# Patient Record
Sex: FEMALE | Race: BLACK OR AFRICAN AMERICAN | NOT HISPANIC OR LATINO | Employment: FULL TIME | URBAN - METROPOLITAN AREA
[De-identification: names, ages, dates, MRNs, and addresses within clinical notes are randomized per-mention and may not be internally consistent; named-entity substitution may affect disease eponyms.]

---

## 2017-01-25 ENCOUNTER — HOSPITAL ENCOUNTER (EMERGENCY)
Facility: HOSPITAL | Age: 27
Discharge: HOME | End: 2017-01-25
Attending: EMERGENCY MEDICINE
Payer: MEDICAID

## 2017-01-25 VITALS
HEIGHT: 66 IN | DIASTOLIC BLOOD PRESSURE: 61 MMHG | BODY MASS INDEX: 35.36 KG/M2 | WEIGHT: 220 LBS | HEART RATE: 89 BPM | OXYGEN SATURATION: 99 % | SYSTOLIC BLOOD PRESSURE: 110 MMHG | TEMPERATURE: 98 F | RESPIRATION RATE: 18 BRPM

## 2017-01-25 DIAGNOSIS — R21 RASH AND NONSPECIFIC SKIN ERUPTION: ICD-10-CM

## 2017-01-25 DIAGNOSIS — J06.9 VIRAL URI: ICD-10-CM

## 2017-01-25 DIAGNOSIS — R50.9 ACUTE FEBRILE ILLNESS: Primary | ICD-10-CM

## 2017-01-25 LAB
B-HCG UR QL: NEGATIVE
CTP QC/QA: YES

## 2017-01-25 PROCEDURE — 25000003 PHARM REV CODE 250: Performed by: NURSE PRACTITIONER

## 2017-01-25 PROCEDURE — 81025 URINE PREGNANCY TEST: CPT | Performed by: NURSE PRACTITIONER

## 2017-01-25 PROCEDURE — 99284 EMERGENCY DEPT VISIT MOD MDM: CPT | Mod: 25

## 2017-01-25 RX ORDER — HYDROCORTISONE 1 %
CREAM (GRAM) TOPICAL
Qty: 30 G | Refills: 0 | Status: ON HOLD | OUTPATIENT
Start: 2017-01-25 | End: 2018-01-20 | Stop reason: HOSPADM

## 2017-01-25 RX ORDER — ACETAMINOPHEN 325 MG/1
650 TABLET ORAL
Status: COMPLETED | OUTPATIENT
Start: 2017-01-25 | End: 2017-01-25

## 2017-01-25 RX ORDER — ONDANSETRON 4 MG/1
4 TABLET, FILM COATED ORAL EVERY 8 HOURS PRN
Qty: 12 TABLET | Refills: 0 | Status: SHIPPED | OUTPATIENT
Start: 2017-01-25 | End: 2017-06-19

## 2017-01-25 RX ORDER — BENZONATATE 100 MG/1
100 CAPSULE ORAL 3 TIMES DAILY PRN
Qty: 20 CAPSULE | Refills: 0 | Status: SHIPPED | OUTPATIENT
Start: 2017-01-25 | End: 2017-02-04

## 2017-01-25 RX ORDER — ONDANSETRON 4 MG/1
4 TABLET, ORALLY DISINTEGRATING ORAL
Status: COMPLETED | OUTPATIENT
Start: 2017-01-25 | End: 2017-01-25

## 2017-01-25 RX ADMIN — ONDANSETRON 4 MG: 4 TABLET, ORALLY DISINTEGRATING ORAL at 05:01

## 2017-01-25 RX ADMIN — ACETAMINOPHEN 650 MG: 325 TABLET ORAL at 05:01

## 2017-01-25 NOTE — ED PROVIDER NOTES
Encounter Date: 1/25/2017    SCRIBE #1 NOTE: I, Yareli Christianson, am scribing for, and in the presence of,  KEITH Kimble. I have scribed the following portions of the note - Other sections scribed: ROS, HPI.       History     Chief Complaint   Patient presents with    Fever     Pt reports fever and chills on and off x 3 day.      Review of patient's allergies indicates:   Allergen Reactions    Amoxil [amoxicillin]     Orange juice Hives    Toradol [ketorolac] Nausea And Vomiting     HPI Comments: CC: Nasal Congestion    HPI: Patient is a 26 y.o. F with a past medical history of Flu, UTI, and Yeast infection who presents to the ED for evaluation of acute onset, worsening nasal congestion x2 days. Patient reports associated cough, sore throat, and myalgias x2 days. She attempted treatment with Ibuprofen (last dose last night) and Tamiflu, but notes mild relief. No modifying factors. She denies fever, nausea, vomiting, diarrhea, and/or headache. She states she had an appointment with PCP at [facility], but her Rapid Flu was negative. Patient also notes a generalized erythematous rash, primarily on her bilateral lower legs and upper arms.  Her reports using a new detergent.  Denies any known insect bites.  She was prescribed azithromycin by her primary care doctor yesterday.    The history is provided by the patient. No  was used.     Past Medical History   Diagnosis Date    Chlamydia     Flu     UTI (lower urinary tract infection)     Yeast infection      Past Medical History Pertinent Negatives   Diagnosis Date Noted    Abnormal Pap smear 7/10/2012    Anemia 7/10/2012    Asthma 7/10/2012    Asthma 4/15/2014    Blood dyscrasia 7/10/2012    Breast disorder 7/10/2012    Chronic kidney disease 7/10/2012    Complication of anesthesia 7/10/2012    Coronary artery disease 7/10/2012    Deep vein thrombosis 7/10/2012    Diabetes mellitus 7/10/2012    Diabetes mellitus 4/15/2014     Herpes simplex without mention of complication 7/10/2012    HIV infection 7/10/2012    Hypertension 7/10/2012    Infertility 7/10/2012    Liver disease 7/10/2012    Mental disorder 7/10/2012    Postpartum depression 7/10/2012    Rh incompatibility 7/10/2012    Seizures 7/10/2012    Sickle cell anemia 7/10/2012    Stroke 7/10/2012    Systemic lupus erythematosus 7/10/2012    Thyroid disease 7/10/2012    Trauma 7/10/2012    Varicosities 7/10/2012     Past Surgical History   Procedure Laterality Date     section      Hernia repair       Family History   Problem Relation Age of Onset    No Known Problems Mother     No Known Problems Father     Diabetes Other     Hypertension Other      Social History   Substance Use Topics    Smoking status: Former Smoker    Smokeless tobacco: None    Alcohol use Yes      Comment: occ     Review of Systems   Constitutional: Positive for diaphoresis. Negative for fever.   HENT: Positive for congestion and sore throat.    Respiratory: Positive for cough.    Gastrointestinal: Negative for abdominal pain, diarrhea, nausea and vomiting.   Genitourinary: Negative for dysuria.   Musculoskeletal: Positive for myalgias.   Skin: Positive for rash (generalized, primarily on BLE and wrists). Negative for color change.   Neurological: Negative for headaches.   Psychiatric/Behavioral: Negative for confusion.       Physical Exam   Initial Vitals   BP Pulse Resp Temp SpO2   17 1646 17 1646 17 1646 17 1646 17 1646   108/58 88 18 99.2 °F (37.3 °C) 98 %     Physical Exam    Nursing note and vitals reviewed.  Constitutional: She appears well-developed and well-nourished. She is not diaphoretic. She is cooperative.  Non-toxic appearance. No distress.   HENT:   Head: Normocephalic and atraumatic.   Right Ear: Tympanic membrane and external ear normal.   Left Ear: Tympanic membrane and external ear normal.   Nose: Rhinorrhea present. Right sinus  exhibits no maxillary sinus tenderness and no frontal sinus tenderness. Left sinus exhibits no maxillary sinus tenderness and no frontal sinus tenderness.   Mouth/Throat: Uvula is midline, oropharynx is clear and moist and mucous membranes are normal. No trismus in the jaw.   Moist mucous membranes.   Eyes: Conjunctivae and EOM are normal. Pupils are equal, round, and reactive to light.   Neck: Normal range of motion, full passive range of motion without pain and phonation normal. No tracheal deviation and normal range of motion present. No rigidity.   Cardiovascular: Normal rate, regular rhythm and normal heart sounds. Exam reveals no gallop and no friction rub.    No murmur heard.  Pulses:       Radial pulses are 2+ on the right side, and 2+ on the left side.        Posterior tibial pulses are 2+ on the right side, and 2+ on the left side.   Pulmonary/Chest: Effort normal and breath sounds normal. No stridor. No tachypnea and no bradypnea. No respiratory distress. She has no wheezes. She has no rhonchi. She has no rales. She exhibits no tenderness.   Abdominal: Soft. Bowel sounds are normal. She exhibits no distension and no mass. There is no tenderness. There is no rigidity, no rebound, no guarding and no CVA tenderness.   Lymphadenopathy:     She has no cervical adenopathy.   Neurological: She is alert and oriented to person, place, and time. She has normal strength. No sensory deficit. GCS eye subscore is 4. GCS verbal subscore is 5. GCS motor subscore is 6.   Skin: Skin is warm, dry and intact. Lesion noted. No petechiae noted. Rash is not pustular, not vesicular and not urticarial. No cyanosis or erythema. Nails show no clubbing.   Patient with multiple small lesions to bilateral lower extremities no larger than 0.5 cm.  These areas have no surrounding erythema, edema, or drainage.  These lesions are blanchable.  No petechiae.  She also has several on her bilateral upper hands or wrists.  No lesions in the  uyen.   Psychiatric: She has a normal mood and affect. Her behavior is normal. Thought content normal.         ED Course   Procedures  Labs Reviewed   POCT URINE PREGNANCY             Medical Decision Making:   History:   Old Records Summarized: records from clinic visits.       <> Summary of Records: Patient seen in clinic yesterday for fever, runny nose, sore throat congestion, cough, abdominal pain and nausea as well as chronic right knee pain.  X-ray showed lateral patellar subluxation recommending MRI.  Discharged in the clinic with Z-Enrique for acute maxillary sinusitis, Phenergan DM.  Influenza testing and clinic was negative.  Recommended Chloraseptic spray for sore throat, ibuprofen for symptomatic treatment.       APC / Resident Notes:   This is an evaluation of a 26 y.o. female that presents to the Emergency Department for chills, runny nose, generalized body aches, cough and nasal congestion for 3 days.  She was seen by her primary care doctor yesterday discharged with prescription for azithromycin.  Her flu screen is negative at the time. The patient is a non-toxic, afebrile, and well appearing female. On physical exam ears and pharynx are without evidence of infection.  Moist mucous membranes.  Neck soft and supple with no meningeal signs or cervical lymphadenopathy. Breath sounds are clear and equal bilaterally with no adventitious breath sounds, tachypnea or respiratory distress with room air pulse ox of 98% and no evidence of hypoxia.  She moves all extremities without difficulty.  There are multiple lesions to bilateral lower extremities and upper extremities that appear to be bites.  There is no surrounding erythema, drainage, or area of induration, or area of increased warmth around these lesions.  Of note, she does report she has a dog that she's been letting outside and does report itching after playing with him.  She also reports that she has recently changed detergents.  She reports her  daughter has similar appearing lesions.  There are no lesions in the webspaces or the oral cavity.  Does not appear fungal.  RESULTS: UPT negative.      Given the above findings, my overall impression is viral URI and rash-likely insect bites versus contact dermatitis. Given the above findings, I do not think the patient has OM, OE, strep pharyngitis, menigintis, pneumonia, asthma, petechial rash, Hand, Foot, and Mouth, Scabies, Shingles, Chicken Pox, meningococcemia, TENs, or SJS.    ED Treatments: Tylenol and Zofran. D/C Meds: Zofran, hydrocortisone cream, and Tessalon pearls. Additional recommendations hydration, observation of lesions on hands and legs. The diagnosis, treatment plan, instructions for follow-up and reevaluation with her PCP as well as ED return precautions have been discussed and she has verbalized an understanding of the information. All questions or concerns have been addressed. This case was discussed with Dr. Guan who is in agreement with my assessment and plan. MI Girard, KEITH-C         Scribe Attestation:   Scribe #1: I performed the above scribed service and the documentation accurately describes the services I performed. I attest to the accuracy of the note.    Attending Attestation:           Physician Attestation for Scribe:  Physician Attestation Statement for Scribe #1: I, KEITH Kimble, reviewed documentation, as scribed by Yareli Christianson in my presence, and it is both accurate and complete.                 ED Course     Clinical Impression:   The primary encounter diagnosis was Acute febrile illness. Diagnoses of Viral URI and Rash and nonspecific skin eruption were also pertinent to this visit.    Disposition:   Disposition: Discharged  Condition: Stable       KEITH Sims  01/25/17 2749

## 2017-01-25 NOTE — ED AVS SNAPSHOT
[facility]  [place] [address]               Catina Myers   2017  4:56 PM   ED    Description:  Female : 1990   Department:  [facility]           Your Care was Coordinated By:     Provider Role From To    Jonny Guan MD Attending Provider 17 [id number] --    KEITH Sims Nurse Practitioner 17 [id number] --      Reason for Visit     Fever           Diagnoses this Visit        Comments    Acute febrile illness    -  Primary     Viral URI         Rash and nonspecific skin eruption           ED Disposition     ED Disposition Condition Comment    Discharge             To Do List           Follow-up Information     Schedule an appointment as soon as possible for a visit with Kishore Gaspar MD.    Specialty:  Family Medicine    Why:  This Week, For Follow-Up    Contact information:    [address] [facility] [address] [phone]          Go to [facility].    Specialty:  Emergency Medicine    Why:  If symptoms worsen    Contact information:    [address] [place] [address] [phone]       These Medications        Disp Refills Start End    ondansetron (ZOFRAN) 4 MG tablet 12 tablet 0 2017     Take 1 tablet (4 mg total) by mouth every 8 (eight) hours as needed. - Oral    Pharmacy: [facility] [employer] [address] [place] [address]  [facility] Ph #: [phone]       benzonatate (TESSALON) 100 MG capsule 20 capsule 0 2017    Take 1 capsule (100 mg total) by mouth 3 (three) times daily as needed for Cough. - Oral    Pharmacy: [facility] [employer] [address] [place] [address] [place] -  [facility] Ph #: [phone]       hydrocortisone 1 % cream 30 g 0 2017    Apply to affected area 2 times daily    [facility] [address] [place] [address] [place] -  [facility] Ph #:  [phone]         [facility] On Call     [facility] On Call Nurse Care Line - 24/7 Assistance  Registered nurses in the [facility] provide clinical advisement, health education, appointment booking, and other advisory services.  Call for this free service at [phone].             Medications           Message regarding Medications     Verify the changes and/or additions to your medication regime listed below are the same as discussed with your clinician today.  If any of these changes or additions are incorrect, please notify your healthcare provider.        START taking these NEW medications        Refills    ondansetron (ZOFRAN) 4 MG tablet 0    Sig: Take 1 tablet (4 mg total) by mouth every 8 (eight) hours as needed.    Class: Print    Route: Oral    benzonatate (TESSALON) 100 MG capsule 0    Sig: Take 1 capsule (100 mg total) by mouth 3 (three) times daily as needed for Cough.    Class: Print    Route: Oral    hydrocortisone 1 % cream 0    Sig: Apply to affected area 2 times daily    Class: Print      These medications were administered today        Dose Freq    ondansetron disintegrating tablet 4 mg 4 mg ED 1 Time    Sig: Take 1 tablet (4 mg total) by mouth ED 1 Time.    Class: Normal    Route: Oral    acetaminophen tablet 650 mg 650 mg ED 1 Time    Sig: Take 2 tablets (650 mg total) by mouth ED 1 Time.    Class: Normal    Route: Oral           Verify that the below list of medications is an accurate representation of the medications you are currently taking.  If none reported, the list may be blank. If incorrect, please contact your healthcare provider. Carry this list with you in case of emergency.           Current Medications     cyclobenzaprine (FLEXERIL) 10 MG tablet Take 1 tablet (10 mg total) by mouth nightly as needed for Muscle spasms.    meloxicam (MOBIC) 15 MG tablet Take 1 tablet (15 mg total) by mouth once daily. TAKE WITH FOOD    acetaminophen tablet 650 mg Take 2 tablets (650 mg  "total) by mouth ED 1 Time.    azithromycin (Z-HARMEET) 250 MG tablet Take 2 tablets on day 1, then take 1 tablet on days 2-5    benzonatate (TESSALON) 100 MG capsule Take 1 capsule (100 mg total) by mouth 3 (three) times daily as needed for Cough.    buPROPion (WELLBUTRIN) 100 MG tablet Take 1 tablet (100 mg total) by mouth 2 (two) times daily.    butalbital-acetaminophen-caffeine -40 mg (FIORICET, ESGIC) -40 mg per tablet Take 1 tablet by mouth every 12 (twelve) hours as needed.    dicyclomine (BENTYL) 20 mg tablet Take 1 tablet (20 mg total) by mouth 3 (three) times daily.    doxycycline (MONODOX) 100 MG capsule Take 1 capsule (100 mg total) by mouth 2 (two) times daily.    fluticasone (FLONASE) 50 mcg/actuation nasal spray 1-2 sprays by Each Nare route once daily.    hydrocodone-acetaminophen 5-325mg (NORCO) 5-325 mg per tablet Take 1 tablet by mouth daily as needed for Pain.    hydrocortisone 1 % cream Apply to affected area 2 times daily    omeprazole (PRILOSEC) 40 MG capsule Take 1 capsule (40 mg total) by mouth once daily.    ondansetron (ZOFRAN) 4 MG tablet Take 1 tablet (4 mg total) by mouth every 8 (eight) hours as needed.    ondansetron disintegrating tablet 4 mg Take 1 tablet (4 mg total) by mouth ED 1 Time.    phentermine (ADIPEX-P) 37.5 mg tablet Take 1 tablet (37.5 mg total) by mouth before breakfast.    promethazine-dextromethorphan (PROMETHAZINE-DM) 6.25-15 mg/5 mL Syrp 1 teaspoon PO Q 8 hrs PRN cough. DO NOT DRIVE AFTER TAKING MED    sucralfate (CARAFATE) 1 gram tablet Take 1 tablet (1 g total) by mouth 3 (three) times daily.           Clinical Reference Information           Your Vitals Were     BP Pulse Temp Resp Height Weight    108/58 (BP Location: Right arm) 88 99.2 °F (37.3 °C) (Oral) 18 5' 6" (1.676 m) 99.8 kg (220 lb)    Last Period SpO2 BMI          01/22/2017 98% 35.51 kg/m2        Allergies as of 1/25/2017        Reactions    Amoxil [Amoxicillin]     Orange Juice Hives    Toradol " [Ketorolac] Nausea And Vomiting      Immunizations Administered on Date of Encounter - 1/25/2017     None      ED Micro, Lab, POCT     Start Ordered       Status Ordering Provider    01/25/17 1722 01/25/17 1721  POCT urine pregnancy  Once      Final result       ED Imaging Orders     None        Discharge Instructions       Please return to the Emergency Department for any new or worsening symptoms including: worsening cough, difficulty breathing, worsening rash\itching\drainage from the wounds on your arms or legs, fever, chest pain, shortness of breath, loss of consciousness, dizziness, weakness, or any other concerns. Please follow up with your Primary Care Provider within in the week.     Please take all medication as prescribed.       Discharge References/Attachments     URI, VIRAL, NO ABX (ADULT) (ENGLISH)    BITES AND STINGS, INSECT (ENGLISH)    INSECT STING, LOCAL REACTION (ENGLISH)      Your Scheduled Appointments     Feb 21, 2017 12:00 PM CST   Follow Up with Kishore Gaspar MD   [facility])    [address] [place] [address] [phone]              [facility] Sign-Up     Activating your [facility] account is as easy as 1-2-3!     1) Visit [email or web address], select Sign Up Now, enter this activation code and your date of birth, then select Next.  [email or web address]  Expires: 3/3/2017  1:20 PM      2) Create a username and password to use when you visit [facility] in the future and select a security question in case you lose your password and select Next.    3) Enter your e-mail address and click Sign Up!    Additional Information  If you have questions, please e-mail [email or web address] or call [phone] to talk to our [facility] staff. Remember, [facility] is NOT to be used for urgent needs. For medical emergencies, dial 911.         Smoking Cessation     If you would like to quit smoking:   You may be eligible for free services if you are a [place] resident and  started smoking cigarettes before September 1, 1988.  Call the Smoking Cessation Trust (SCT) toll free at (844) 283-4037 or (262) 458-5330.   Call 1-800-QUIT-NOW if you do not meet the above criteria.             [facility] complies with applicable Federal civil rights laws and does not discriminate on the basis of race, color, national origin, age, disability, or sex.        Language Assistance Services     ATTENTION: Language assistance services are available, free of charge. Please call [phone].      ATENCIÓN: Si habla español, tiene a francisco disposición servicios gratuitos de asistencia lingüística. Llame al [phone].     CHÚ Ý: N?u b?n nói Ti?ng Vi?t, có các d?ch v? h? tr? ngôn ng? mi?n phí dành cho b?n. G?i s? [phone].

## 2017-01-25 NOTE — DISCHARGE INSTRUCTIONS
Please return to the Emergency Department for any new or worsening symptoms including: worsening cough, difficulty breathing, worsening rash\itching\drainage from the wounds on your arms or legs, fever, chest pain, shortness of breath, loss of consciousness, dizziness, weakness, or any other concerns. Please follow up with your Primary Care Provider within in the week.     Please take all medication as prescribed.

## 2017-04-19 ENCOUNTER — HOSPITAL ENCOUNTER (EMERGENCY)
Facility: OTHER | Age: 27
Discharge: HOME | End: 2017-04-19
Attending: EMERGENCY MEDICINE
Payer: MEDICAID

## 2017-04-19 VITALS
BODY MASS INDEX: 40.12 KG/M2 | HEIGHT: 64 IN | HEART RATE: 78 BPM | SYSTOLIC BLOOD PRESSURE: 124 MMHG | DIASTOLIC BLOOD PRESSURE: 83 MMHG | TEMPERATURE: 99 F | RESPIRATION RATE: 18 BRPM | WEIGHT: 235 LBS | OXYGEN SATURATION: 100 %

## 2017-04-19 DIAGNOSIS — J40 BRONCHITIS: Primary | ICD-10-CM

## 2017-04-19 LAB
INFLUENZA A ANTIGEN, POC: NEGATIVE
INFLUENZA B ANTIGEN, POC: NEGATIVE
POC RAPID STREP A: NEGATIVE

## 2017-04-19 PROCEDURE — 81025 URINE PREGNANCY TEST: CPT

## 2017-04-19 PROCEDURE — 81003 URINALYSIS AUTO W/O SCOPE: CPT

## 2017-04-19 PROCEDURE — 99284 EMERGENCY DEPT VISIT MOD MDM: CPT | Mod: 25

## 2017-04-19 PROCEDURE — 96372 THER/PROPH/DIAG INJ SC/IM: CPT

## 2017-04-19 PROCEDURE — 63600175 PHARM REV CODE 636 W HCPCS: Performed by: EMERGENCY MEDICINE

## 2017-04-19 RX ORDER — CODEINE PHOSPHATE AND GUAIFENESIN 10; 100 MG/5ML; MG/5ML
10 SOLUTION ORAL EVERY 4 HOURS PRN
Qty: 236 ML | Refills: 0 | Status: SHIPPED | OUTPATIENT
Start: 2017-04-19 | End: 2017-04-29

## 2017-04-19 RX ORDER — IBUPROFEN 600 MG/1
600 TABLET ORAL EVERY 6 HOURS PRN
Qty: 20 TABLET | Refills: 0 | Status: SHIPPED | OUTPATIENT
Start: 2017-04-19 | End: 2017-05-24 | Stop reason: SDUPTHER

## 2017-04-19 RX ORDER — AZITHROMYCIN 250 MG/1
TABLET, FILM COATED ORAL
Qty: 6 TABLET | Refills: 0 | Status: SHIPPED | OUTPATIENT
Start: 2017-04-19 | End: 2017-05-24

## 2017-04-19 RX ORDER — DEXAMETHASONE SODIUM PHOSPHATE 4 MG/ML
4 INJECTION, SOLUTION INTRA-ARTICULAR; INTRALESIONAL; INTRAMUSCULAR; INTRAVENOUS; SOFT TISSUE
Status: COMPLETED | OUTPATIENT
Start: 2017-04-19 | End: 2017-04-19

## 2017-04-19 RX ADMIN — DEXAMETHASONE SODIUM PHOSPHATE 4 MG: 4 INJECTION, SOLUTION INTRAMUSCULAR; INTRAVENOUS at 04:04

## 2017-04-19 NOTE — ED PROVIDER NOTES
Encounter Date: 2017       History     Chief Complaint   Patient presents with    Generalized Body Aches     Pt c/o of gen body aches x 3 days, sore throat, congestion.     Review of patient's allergies indicates:   Allergen Reactions    Amoxil [amoxicillin]     Orange juice Hives    Toradol [ketorolac] Nausea And Vomiting     The history is provided by the patient.    26-year-old who complains of back pain and generalized body aches for the last 2 days.  She also reports chills, sore throat, nasal congestion and a cough.  Cough is nonproductive.  She has a headache as well.  No fever.  She's been taking multiple over-the-counter medicines without improvement.  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  Past Medical History:   Diagnosis Date    Chlamydia     Flu     UTI (lower urinary tract infection)     Yeast infection      Past Surgical History:   Procedure Laterality Date     SECTION      HERNIA REPAIR       Family History   Problem Relation Age of Onset    No Known Problems Mother     No Known Problems Father     Diabetes Other     Hypertension Other      Social History   Substance Use Topics    Smoking status: Former Smoker    Smokeless tobacco: None    Alcohol use Yes      Comment: occ     Review of Systems   Constitutional: Positive for chills. Negative for appetite change and fever.   HENT: Positive for congestion and sore throat.    Eyes: Negative for pain.   Respiratory: Positive for cough. Negative for shortness of breath.    Cardiovascular: Negative for chest pain.   Gastrointestinal: Negative for abdominal pain.   Genitourinary: Negative for dysuria.   Musculoskeletal: Positive for back pain and myalgias. Negative for neck stiffness.   Skin: Negative for rash.   Neurological: Positive for headaches.       Physical Exam   Initial Vitals   BP Pulse Resp Temp SpO2   17 1450 17 1450 17 1450 17 1450 17 1450    124/83 78 18 98.6 °F (37 °C) 100 %     Physical Exam    Nursing note and vitals reviewed.  Constitutional: She appears well-developed and well-nourished.   HENT:   Head: Normocephalic and atraumatic.   Right Ear: Tympanic membrane normal.   Left Ear: Tympanic membrane normal.   Nose: Mucosal edema present.   Mouth/Throat: Uvula is midline and oropharynx is clear and moist. No oropharyngeal exudate or posterior oropharyngeal edema.   Eyes: Conjunctivae and EOM are normal. Pupils are equal, round, and reactive to light.   Neck: Normal range of motion. Neck supple.   Cardiovascular: Normal rate and regular rhythm.   Pulmonary/Chest: Breath sounds normal.   Abdominal: Soft. There is no tenderness. There is no rebound and no guarding.   Musculoskeletal: Normal range of motion.   Neurological: She is alert and oriented to person, place, and time. She has normal strength.   Skin: Skin is warm and dry.   Psychiatric: She has a normal mood and affect.         ED Course   Procedures  Labs Reviewed   POCT INFLUENZA A/B - Abnormal; Notable for the following:        Result Value    Influenza B Ag Negative (*)     Inflenza A Ag Negative (*)     All other components within normal limits   POCT STREP A, RAPID - Abnormal; Notable for the following:     POC Rapid Strep A Negative (*)     All other components within normal limits   POCT RAPID STREP A   POCT INFLUENZA A/B             Medical Decision Making:   Initial Assessment:   26-year-old complaining of flulike symptoms for the last 2 days.  Lungs are clear.  Oropharynx is without erythema or exudate   ED Management:  Patient was checked for influenza as well as strep pharyngitis.  Both the tests were negative.  She'll be discharged on guaifenesin with codeine, ibuprofen and  azithromycin.                   ED Course     Clinical Impression:   The encounter diagnosis was Bronchitis.          Dori Adam MD  04/19/17 2416

## 2017-04-19 NOTE — DISCHARGE INSTRUCTIONS
Bronchitis, Antibiotic Treatment (Adult)    Bronchitis is an infection of the air passages (bronchial tubes) in your lungs. It often occurs when you have a cold. This illness is contagious during the first few days and is spread through the air by coughing and sneezing, or by direct contact (touching the sick person and then touching your own eyes, nose, or mouth).  Symptoms of bronchitis include cough with mucus (phlegm) and low-grade fever. Bronchitis usually lasts 7 to 14 days. Mild cases can be treated with simple home remedies. More severe infection is treated with an antibiotic.  Home care  Follow these guidelines when caring for yourself at home:  · If your symptoms are severe, rest at home for the first 2 to 3 days. When you go back to your usual activities, don't let yourself get too tired.  · Do not smoke. Also avoid being exposed to secondhand smoke.  · You may use over-the-counter medicines to control fever or pain, unless another medicine was prescribed. (Note: If you have chronic liver or kidney disease or have ever had a stomach ulcer or gastrointestinal bleeding, talk with your healthcare provider before using these medicines. Also talk to your provider if you are taking medicine to prevent blood clots.) Aspirin should never be given to anyone younger than 18 years of age who is ill with a viral infection or fever. It may cause severe liver or brain damage.  · Your appetite may be poor, so a light diet is fine. Avoid dehydration by drinking 6 to 8 glasses of fluids per day (such as water, soft drinks, sports drinks, juices, tea, or soup). Extra fluids will help loosen secretions in the nose and lungs.  · Over-the-counter cough, cold, and sore-throat medicines will not shorten the length of the illness, but they may be helpful to reduce symptoms. (Note: Do not use decongestants if you have high blood pressure.)  · Finish all antibiotic medicine. Do this even if you are feeling better after only a  few days.  Follow-up care  Follow up with your healthcare provider, or as advised. If you had an X-ray or ECG (electrocardiogram), a specialist will review it. You will be notified of any new findings that may affect your care.  Note: If you are age 65 or older, or if you have a chronic lung disease or condition that affects your immune system, or you smoke, talk to your healthcare provider about having pneumococcal vaccinations and a yearly influenza vaccination (flu shot).  When to seek medical advice  Call your healthcare provider right away if any of these occur:  · Fever of 100.4°F (38°C) or higher  · Coughing up increased amounts of colored sputum  · Weakness, drowsiness, headache, facial pain, ear pain, or a stiff neck  Call 911, or get immediate medical care  Contact emergency services right away if any of these occur.  · Coughing up blood  · Worsening weakness, drowsiness, headache, or stiff neck  · Trouble breathing, wheezing, or pain with breathing  Date Last Reviewed: 9/13/2015 © 2000-2016 [employer] [place]. [address] [place] [address], [place] [address]. All rights reserved. This information is not intended as a substitute for professional medical care. Always follow your healthcare professional's instructions.

## 2017-04-19 NOTE — ED AVS SNAPSHOT
[facility] EMERGENCY DEPARTMENT  [address] [address]               Catina Myers   2017  3:09 PM   ED    Description:  Female : 1990   Department:  [facility] Emergency Department           Your Care was Coordinated By:     Provider Role From To    Dori Adam MD Attending Provider 17 1519 --      Reason for Visit     Generalized Body Aches           Diagnoses this Visit        Comments    Bronchitis    -  Primary       ED Disposition     None           To Do List           Follow-up Information     Schedule an appointment as soon as possible for a visit with Kishore Gaspar MD.    Specialty:  Family Medicine    Contact information:    [address] [facility] [address]  [phone]          Follow up with [facility] Emergency Department.    Specialty:  Emergency Medicine    Why:  If symptoms worsen    Contact information:    [address] [facility] [address]  [phone]       These Medications        Disp Refills Start End    azithromycin (Z-HARMEET) 250 MG tablet 6 tablet 0 2017     2 PO x 1 dose, then 1 PO Q day x 4 days    [facility] [address] [place] [address]  "[employer] [facility] #: [phone]       guaifenesin-codeine 100-10 mg/5 ml (TUSSI-ORGANIDIN NR)  mg/5 mL syrup 236 mL 0 2017    Take 10 mLs by mouth every 4 (four) hours as needed for Cough. - Oral    Pharmacy: [facility] [address] [place] [address]  "[employer] [facility] #: [phone]       ibuprofen (ADVIL,MOTRIN) 600 MG tablet 20 tablet 0 2017     Take 1 tablet (600 mg total) by mouth every 6 (six) hours as needed for Pain. - Oral    [facility] [address] [place] [address]  [facility]: [phone]         [facility]     [facility] Care [facility] -  Assistance  Unless otherwise directed by your provider, please contact  [facility], our nurse care line that is available for  assistance.     Registered nurses in the [facility] provide: appointment scheduling, clinical advisement, health education, and other advisory services.  Call: [phone] (toll free)               Medications           Message regarding Medications     Verify the changes and/or additions to your medication regime listed below are the same as discussed with your clinician today.  If any of these changes or additions are incorrect, please notify your healthcare provider.        START taking these NEW medications        Refills    azithromycin (Z-HARMEET) 250 MG tablet 0    Si PO x 1 dose, then 1 PO Q day x 4 days    Class: Print    guaifenesin-codeine 100-10 mg/5 ml (TUSSI-ORGANIDIN NR)  mg/5 mL syrup 0    Sig: Take 10 mLs by mouth every 4 (four) hours as needed for Cough.    Class: Print    Route: Oral    ibuprofen (ADVIL,MOTRIN) 600 MG tablet 0    Sig: Take 1 tablet (600 mg total) by mouth every 6 (six) hours as needed for Pain.    Class: Print    Route: Oral      These medications were administered today        Dose Freq    dexamethasone injection 4 mg 4 mg ED 1 Time    Sig: Inject 1 mL (4 mg total) into the muscle ED 1 Time.    Class: Normal    Route: Intramuscular           Verify that the below list of medications is an accurate representation of the medications you are currently taking.  If none reported, the list may be blank. If incorrect, please contact your healthcare provider. Carry this list with you in case of emergency.           Current Medications     atomoxetine (STRATTERA) 10 MG capsule Take 1 capsule (10 mg total) by mouth once daily.    azithromycin (Z-HARMEET) 250 MG tablet 2 PO x 1 dose, then 1 PO Q day x 4 days    buPROPion (WELLBUTRIN) 100 MG tablet Take 1 tablet (100 mg total) by mouth 2 (two) times daily.    butalbital-acetaminophen-caffeine -40 mg (FIORICET, ESGIC) -40 mg per tablet Take 1 tablet  "by mouth every 12 (twelve) hours as needed.    cyclobenzaprine (FLEXERIL) 10 MG tablet Take 1 tablet (10 mg total) by mouth nightly as needed for Muscle spasms.    dexamethasone injection 4 mg Inject 1 mL (4 mg total) into the muscle ED 1 Time.    dicyclomine (BENTYL) 20 mg tablet Take 1 tablet (20 mg total) by mouth 3 (three) times daily.    doxycycline (MONODOX) 100 MG capsule Take 1 capsule (100 mg total) by mouth 2 (two) times daily.    fluticasone (FLONASE) 50 mcg/actuation nasal spray 1-2 sprays by Each Nare route once daily.    guaifenesin-codeine 100-10 mg/5 ml (TUSSI-ORGANIDIN NR)  mg/5 mL syrup Take 10 mLs by mouth every 4 (four) hours as needed for Cough.    hydrocodone-acetaminophen 5-325mg (NORCO) 5-325 mg per tablet Take 1 tablet by mouth daily as needed for Pain.    hydrocortisone 1 % cream Apply to affected area 2 times daily    ibuprofen (ADVIL,MOTRIN) 600 MG tablet Take 1 tablet (600 mg total) by mouth every 6 (six) hours as needed for Pain.    meloxicam (MOBIC) 15 MG tablet Take 1 tablet (15 mg total) by mouth once daily. TAKE WITH FOOD    omeprazole (PRILOSEC) 40 MG capsule Take 1 capsule (40 mg total) by mouth once daily.    ondansetron (ZOFRAN) 4 MG tablet Take 1 tablet (4 mg total) by mouth every 8 (eight) hours as needed.    phentermine (ADIPEX-P) 37.5 mg tablet Take 1 tablet (37.5 mg total) by mouth before breakfast.    promethazine-dextromethorphan (PROMETHAZINE-DM) 6.25-15 mg/5 mL Syrp 1 teaspoon PO Q 8 hrs PRN cough. DO NOT DRIVE AFTER TAKING MED    sucralfate (CARAFATE) 1 gram tablet Take 1 tablet (1 g total) by mouth 3 (three) times daily.    topiramate (TOPAMAX) 25 MG tablet Take 1 tablet (25 mg total) by mouth 2 (two) times daily.           Clinical Reference Information           Your Vitals Were     BP Pulse Temp Resp Height Weight    124/83 (BP Location: Left arm, Patient Position: Sitting) 78 98.6 °F (37 °C) (Oral) 18 5' 4" (1.626 m) 106.6 kg (235 lb)    Last Period SpO2 BMI "          04/16/2017 100% 40.34 kg/m2        Allergies as of 4/19/2017        Reactions    Amoxil [Amoxicillin]     Orange Juice Hives    Toradol [Ketorolac] Nausea And Vomiting      Immunizations Administered on Date of Encounter - 4/19/2017     None      ED Micro, Lab, POCT     Start Ordered       Status Ordering Provider    04/19/17 1625 04/19/17 1625  POCT Rapid Strep A  Once      Final result     04/19/17 1610 04/19/17 1610  POCT Influenza A/B  Once      Final result     04/19/17 1539 04/19/17 1538  POCT rapid strep A  Once      Acknowledged     04/19/17 1539 04/19/17 1538  POCT Influenza A/B  Once      Acknowledged       ED Imaging Orders     None        Discharge Instructions         Bronchitis, Antibiotic Treatment (Adult)    Bronchitis is an infection of the air passages (bronchial tubes) in your lungs. It often occurs when you have a cold. This illness is contagious during the first few days and is spread through the air by coughing and sneezing, or by direct contact (touching the sick person and then touching your own eyes, nose, or mouth).  Symptoms of bronchitis include cough with mucus (phlegm) and low-grade fever. Bronchitis usually lasts 7 to 14 days. Mild cases can be treated with simple home remedies. More severe infection is treated with an antibiotic.  Home care  Follow these guidelines when caring for yourself at home:  · If your symptoms are severe, rest at home for the first 2 to 3 days. When you go back to your usual activities, don't let yourself get too tired.  · Do not smoke. Also avoid being exposed to secondhand smoke.  · You may use over-the-counter medicines to control fever or pain, unless another medicine was prescribed. (Note: If you have chronic liver or kidney disease or have ever had a stomach ulcer or gastrointestinal bleeding, talk with your healthcare provider before using these medicines. Also talk to your provider if you are taking medicine to prevent blood clots.) Aspirin  should never be given to anyone younger than 18 years of age who is ill with a viral infection or fever. It may cause severe liver or brain damage.  · Your appetite may be poor, so a light diet is fine. Avoid dehydration by drinking 6 to 8 glasses of fluids per day (such as water, soft drinks, sports drinks, juices, tea, or soup). Extra fluids will help loosen secretions in the nose and lungs.  · Over-the-counter cough, cold, and sore-throat medicines will not shorten the length of the illness, but they may be helpful to reduce symptoms. (Note: Do not use decongestants if you have high blood pressure.)  · Finish all antibiotic medicine. Do this even if you are feeling better after only a few days.  Follow-up care  Follow up with your healthcare provider, or as advised. If you had an X-ray or ECG (electrocardiogram), a specialist will review it. You will be notified of any new findings that may affect your care.  Note: If you are age 65 or older, or if you have a chronic lung disease or condition that affects your immune system, or you smoke, talk to your healthcare provider about having pneumococcal vaccinations and a yearly influenza vaccination (flu shot).  When to seek medical advice  Call your healthcare provider right away if any of these occur:  · Fever of 100.4°F (38°C) or higher  · Coughing up increased amounts of colored sputum  · Weakness, drowsiness, headache, facial pain, ear pain, or a stiff neck  Call 911, or get immediate medical care  Contact emergency services right away if any of these occur.  · Coughing up blood  · Worsening weakness, drowsiness, headache, or stiff neck  · Trouble breathing, wheezing, or pain with breathing  Date Last Reviewed: 9/13/2015 © 2000-2016 [employer]. [address] [place] [address], [place] [address]. All rights reserved. This information is not intended as a substitute for professional medical care. Always follow your healthcare professional's  instructions.          Your Scheduled Appointments     Apr 20, 2017  2:30 PM CDT   Follow Up with Kishore Gaspar MD   [facility] ([facility])    [address]  [place] [address] [phone]              [facility] Sign-Up     Activating your [facility] account is as easy as 1-2-3!     1) Visit [email or web address], select Sign Up Now, enter this activation code and your date of birth, then select Next.  [email or web address]  Expires: 4/29/2017 11:42 AM      2) Create a username and password to use when you visit [facility] in the future and select a security question in case you lose your password and select Next.    3) Enter your e-mail address and click Sign Up!    Additional Information  If you have questions, please e-mail [email or web address] or call [phone] to talk to our [facility] staff. Remember, [facility] is NOT to be used for urgent needs. For medical emergencies, dial 911.         Smoking Cessation     If you would like to quit smoking:   You may be eligible for free services if you are a [place] resident and started smoking cigarettes before September 1, 1988.  Call the Smoking Cessation Trust (SCT) toll free at (409) 104-9702 or (831) 912-8357.   Call 1-800-QUIT-NOW if you do not meet the above criteria.   Contact us via email: [email or web address]   View our website for more information: [email or web address]         [facility] complies with applicable Federal civil rights laws and does not discriminate on the basis of race, color, national origin, age, disability, or sex.        Language Assistance Services     ATTENTION: Language assistance services are available, free of charge. Please call [phone].      ATENCIÓN: Si habla español, tiene a francisco disposición servicios gratuitos de asistencia lingüística. Llame al [phone].     CHÚ Ý: N?u b?n nói Ti?ng Vi?t, có các d?ch v? h? tr? ngôn ng? mi?n phí dành cho b?n. G?i s?  [phone].

## 2017-05-24 ENCOUNTER — HOSPITAL ENCOUNTER (EMERGENCY)
Facility: OTHER | Age: 27
Discharge: HOME | End: 2017-05-24
Attending: EMERGENCY MEDICINE
Payer: COMMERCIAL

## 2017-05-24 VITALS
OXYGEN SATURATION: 100 % | SYSTOLIC BLOOD PRESSURE: 144 MMHG | HEIGHT: 64 IN | BODY MASS INDEX: 39.95 KG/M2 | TEMPERATURE: 99 F | RESPIRATION RATE: 20 BRPM | HEART RATE: 84 BPM | WEIGHT: 234 LBS | DIASTOLIC BLOOD PRESSURE: 78 MMHG
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
## 2017-05-24 DIAGNOSIS — M54.6 ACUTE BILATERAL THORACIC BACK PAIN: Primary | ICD-10-CM

## 2017-05-24 DIAGNOSIS — M54.50 ACUTE BILATERAL LOW BACK PAIN WITHOUT SCIATICA: ICD-10-CM

## 2017-05-24 LAB
B-HCG UR QL: NEGATIVE
CTP QC/QA: YES

## 2017-05-24 PROCEDURE — 99284 EMERGENCY DEPT VISIT MOD MDM: CPT | Mod: 25

## 2017-05-24 PROCEDURE — 81025 URINE PREGNANCY TEST: CPT | Performed by: EMERGENCY MEDICINE

## 2017-05-24 PROCEDURE — 25000003 PHARM REV CODE 250: Performed by: EMERGENCY MEDICINE

## 2017-05-24 RX ORDER — IBUPROFEN 600 MG/1
600 TABLET ORAL EVERY 6 HOURS PRN
Qty: 20 TABLET | Refills: 0 | Status: SHIPPED | OUTPATIENT
Start: 2017-05-24 | End: 2017-06-19

## 2017-05-24 RX ORDER — IBUPROFEN 600 MG/1
600 TABLET ORAL
Status: COMPLETED | OUTPATIENT
Start: 2017-05-24 | End: 2017-05-24

## 2017-05-24 RX ORDER — HYDROCODONE BITARTRATE AND ACETAMINOPHEN 5; 325 MG/1; MG/1
1 TABLET ORAL EVERY 4 HOURS PRN
Qty: 6 TABLET | Refills: 0 | Status: SHIPPED | OUTPATIENT
Start: 2017-05-24 | End: 2017-06-19

## 2017-05-24 RX ORDER — DIAZEPAM 5 MG/1
5 TABLET ORAL
Status: COMPLETED | OUTPATIENT
Start: 2017-05-24 | End: 2017-05-24

## 2017-05-24 RX ORDER — METHOCARBAMOL 750 MG/1
1500 TABLET, FILM COATED ORAL 4 TIMES DAILY
Qty: 40 TABLET | Refills: 0 | Status: SHIPPED | OUTPATIENT
Start: 2017-05-24 | End: 2017-05-29

## 2017-05-24 RX ADMIN — IBUPROFEN 600 MG: 600 TABLET, FILM COATED ORAL at 04:05

## 2017-05-24 RX ADMIN — DIAZEPAM 5 MG: 5 TABLET ORAL at 04:05
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
## 2017-06-19 ENCOUNTER — HOSPITAL ENCOUNTER (EMERGENCY)
Facility: OTHER | Age: 27
Discharge: HOME | End: 2017-06-19
Attending: EMERGENCY MEDICINE
Payer: MEDICAID

## 2017-06-19 VITALS
RESPIRATION RATE: 18 BRPM | HEIGHT: 64 IN | WEIGHT: 246 LBS | BODY MASS INDEX: 42 KG/M2 | DIASTOLIC BLOOD PRESSURE: 85 MMHG | OXYGEN SATURATION: 100 % | HEART RATE: 94 BPM | TEMPERATURE: 99 F | SYSTOLIC BLOOD PRESSURE: 135 MMHG

## 2017-06-19 DIAGNOSIS — O20.0 THREATENED MISCARRIAGE IN EARLY PREGNANCY: Primary | ICD-10-CM

## 2017-06-19 DIAGNOSIS — R10.32 LLQ PAIN: ICD-10-CM

## 2017-06-19 LAB
ABO + RH BLD: NORMAL
B-HCG UR QL: POSITIVE
BILIRUBIN, POC UA: NEGATIVE
BLOOD, POC UA: NEGATIVE
C TRACH DNA SPEC QL NAA+PROBE: NOT DETECTED
CANDIDA RRNA VAG QL PROBE: NEGATIVE
CLARITY, POC UA: CLEAR
COLOR, POC UA: YELLOW
CTP QC/QA: YES
G VAGINALIS RRNA GENITAL QL PROBE: POSITIVE
GLUCOSE, POC UA: NEGATIVE
HCG INTACT+B SERPL-ACNC: 8898 MIU/ML
KETONES, POC UA: NEGATIVE
LEUKOCYTE EST, POC UA: NEGATIVE
N GONORRHOEA DNA SPEC QL NAA+PROBE: NOT DETECTED
NITRITE, POC UA: NEGATIVE
PH UR STRIP: 5.5 [PH]
PROTEIN, POC UA: NEGATIVE
SAMPLE: NORMAL
SPECIFIC GRAVITY, POC UA: >=1.03
T VAGINALIS RRNA GENITAL QL PROBE: NEGATIVE
UROBILINOGEN, POC UA: 0.2 E.U./DL

## 2017-06-19 PROCEDURE — 81025 URINE PREGNANCY TEST: CPT

## 2017-06-19 PROCEDURE — 85025 COMPLETE CBC W/AUTO DIFF WBC: CPT

## 2017-06-19 PROCEDURE — 87591 N.GONORRHOEAE DNA AMP PROB: CPT

## 2017-06-19 PROCEDURE — 25000003 PHARM REV CODE 250: Performed by: EMERGENCY MEDICINE

## 2017-06-19 PROCEDURE — 84702 CHORIONIC GONADOTROPIN TEST: CPT

## 2017-06-19 PROCEDURE — 86900 BLOOD TYPING SEROLOGIC ABO: CPT

## 2017-06-19 PROCEDURE — 80053 COMPREHEN METABOLIC PANEL: CPT

## 2017-06-19 PROCEDURE — 81003 URINALYSIS AUTO W/O SCOPE: CPT

## 2017-06-19 PROCEDURE — 86901 BLOOD TYPING SEROLOGIC RH(D): CPT

## 2017-06-19 PROCEDURE — 81025 URINE PREGNANCY TEST: CPT | Performed by: EMERGENCY MEDICINE

## 2017-06-19 PROCEDURE — 99284 EMERGENCY DEPT VISIT MOD MDM: CPT | Mod: 25

## 2017-06-19 PROCEDURE — 81001 URINALYSIS AUTO W/SCOPE: CPT

## 2017-06-19 PROCEDURE — 87480 CANDIDA DNA DIR PROBE: CPT

## 2017-06-19 RX ORDER — ACETAMINOPHEN 325 MG/1
650 TABLET ORAL
Status: COMPLETED | OUTPATIENT
Start: 2017-06-19 | End: 2017-06-19

## 2017-06-19 RX ADMIN — ACETAMINOPHEN 650 MG: 325 TABLET ORAL at 02:06

## 2017-06-19 NOTE — ED PROVIDER NOTES
Encounter Date: 2017       History     Chief Complaint   Patient presents with    Vaginal Bleeding     states she is 1 month pregnant and noticed spotting when wiping this a.m.     Review of patient's allergies indicates:   Allergen Reactions    Amoxil [amoxicillin]     Orange juice Hives    Toradol [ketorolac] Nausea And Vomiting     Ms Myers reports onset of trace vag bleeding, spotting noticed when wiping this morning.  Reports LLQ cramping since onset of pregnancy, for several weeks.. LMP 5/9, no prenatal care yet, first appt later this week. Still performing adls. No nausea or vomiting. No vag bleeding in past. , 5yr old child.       The history is provided by the patient.     Past Medical History:   Diagnosis Date    Chlamydia     Flu     UTI (lower urinary tract infection)     Yeast infection      Past Surgical History:   Procedure Laterality Date     SECTION      HERNIA REPAIR       Family History   Problem Relation Age of Onset    No Known Problems Mother     No Known Problems Father     Diabetes Other     Hypertension Other      Social History   Substance Use Topics    Smoking status: Former Smoker    Smokeless tobacco: Not on file    Alcohol use Yes      Comment: occ     Review of Systems   Gastrointestinal: Positive for abdominal pain.        LLQ   Genitourinary: Positive for vaginal bleeding. Negative for dysuria.   All other systems reviewed and are negative.      Physical Exam     Initial Vitals [17 1308]   BP Pulse Resp Temp SpO2   125/86 96 18 98.5 °F (36.9 °C) 100 %     Physical Exam    Nursing note and vitals reviewed.  Constitutional: She appears well-developed and well-nourished. She is not diaphoretic. No distress.   HENT:   Head: Normocephalic and atraumatic.   Eyes: EOM are normal. Pupils are equal, round, and reactive to light.   Neck: Normal range of motion. Neck supple.   Pulmonary/Chest: Breath sounds normal. No respiratory distress.   Abdominal:  Soft. She exhibits no distension and no mass. There is no rebound and no guarding.   Mild far LLQ ttp   Genitourinary: Pelvic exam was performed with patient supine. There is no rash, tenderness or lesion on the right labia. There is no rash, tenderness or lesion on the left labia. Cervix exhibits no motion tenderness, no discharge and no friability. Right adnexum displays no mass and no tenderness. Left adnexum displays no mass and no tenderness. Vaginal discharge found.       Musculoskeletal: Normal range of motion. She exhibits no edema or tenderness.   Neurological: She is alert and oriented to person, place, and time.   Skin: Skin is warm and dry. Capillary refill takes less than 2 seconds. No rash noted. No erythema.   Psychiatric: She has a normal mood and affect. Her behavior is normal. Thought content normal.         ED Course   Procedures  Labs Reviewed   POCT URINE PREGNANCY - Abnormal; Notable for the following:        Result Value    POC Preg Test, Ur Positive (*)     All other components within normal limits   POCT URINALYSIS W/O SCOPE - Abnormal; Notable for the following:     Glucose, UA Negative (*)     Bilirubin, UA Negative (*)     Ketones, UA Negative (*)     Spec Grav UA >=1.030 (*)     Blood, UA Negative (*)     Protein, UA Negative (*)     Nitrite, UA Negative (*)     Leukocytes, UA Negative (*)     All other components within normal limits   C. TRACHOMATIS/N. GONORRHOEAE BY AMP DNA   HCG, QUANTITATIVE, PREGNANCY   VAGINAL SCREEN   POCT URINALYSIS W/O SCOPE   POCT CBC   GROUP & RH             Medical Decision Making:   Clinical Tests:   Lab Tests: Ordered and Reviewed  The following lab test(s) were unremarkable: CBC, Urinalysis and UPT  Radiological Study: Ordered and Reviewed  ED Management:  Ultrasound results discussed with patient.  She is stable for discharge.  She has follow-up appointment scheduled for Thursday already and will keep.  She needs repeat beta and ultrasound for follow-up.   We discussed worrisome signs that should prompt immediate return to the ER should they occur.  There is no indication for further emergent intervention or evaluation this time.                   ED Course     Clinical Impression:   The primary encounter diagnosis was Threatened miscarriage in early pregnancy. A diagnosis of LLQ pain was also pertinent to this visit.          Evelia Verde MD  06/20/17 4951

## 2017-06-22 ENCOUNTER — TELEPHONE (OUTPATIENT)
Dept: EMERGENCY MEDICINE | Facility: OTHER | Age: 27
End: 2017-06-22

## 2017-06-22 PROBLEM — N88.3 INCOMPETENT CERVIX: Status: ACTIVE | Noted: 2017-06-22

## 2017-06-22 PROBLEM — Z98.891 H/O: C-SECTION: Status: ACTIVE | Noted: 2017-06-22

## 2017-07-11 ENCOUNTER — HOSPITAL ENCOUNTER (EMERGENCY)
Facility: HOSPITAL | Age: 27
Discharge: HOME | End: 2017-07-11
Attending: EMERGENCY MEDICINE | Admitting: EMERGENCY MEDICINE
Payer: MEDICAID

## 2017-07-11 VITALS
DIASTOLIC BLOOD PRESSURE: 70 MMHG | HEART RATE: 91 BPM | TEMPERATURE: 100 F | BODY MASS INDEX: 38.57 KG/M2 | WEIGHT: 240 LBS | SYSTOLIC BLOOD PRESSURE: 120 MMHG | OXYGEN SATURATION: 100 % | HEIGHT: 66 IN | RESPIRATION RATE: 18 BRPM

## 2017-07-11 DIAGNOSIS — Z3A.09 9 WEEKS GESTATION OF PREGNANCY: ICD-10-CM

## 2017-07-11 DIAGNOSIS — R10.9 ABDOMINAL PAIN, UNSPECIFIED LOCATION: Primary | ICD-10-CM

## 2017-07-11 LAB
BASOPHILS # BLD AUTO: 0.01 K/UL
BASOPHILS NFR BLD: 0.1 %
DIFFERENTIAL METHOD: ABNORMAL
EOSINOPHIL # BLD AUTO: 0 K/UL
EOSINOPHIL NFR BLD: 0.3 %
ERYTHROCYTE [DISTWIDTH] IN BLOOD BY AUTOMATED COUNT: 14.3 %
HCT VFR BLD AUTO: 39.4 %
HGB BLD-MCNC: 13.9 G/DL
LYMPHOCYTES # BLD AUTO: 1.1 K/UL
LYMPHOCYTES NFR BLD: 9.5 %
MCH RBC QN AUTO: 29.3 PG
MCHC RBC AUTO-ENTMCNC: 35.3 %
MCV RBC AUTO: 83 FL
MONOCYTES # BLD AUTO: 0.4 K/UL
MONOCYTES NFR BLD: 3.7 %
NEUTROPHILS # BLD AUTO: 10 K/UL
NEUTROPHILS NFR BLD: 86.3 %
PLATELET # BLD AUTO: 340 K/UL
PMV BLD AUTO: 10.4 FL
RBC # BLD AUTO: 4.75 M/UL
WBC # BLD AUTO: 11.53 K/UL

## 2017-07-11 PROCEDURE — 96374 THER/PROPH/DIAG INJ IV PUSH: CPT

## 2017-07-11 PROCEDURE — 99284 EMERGENCY DEPT VISIT MOD MDM: CPT | Mod: 25

## 2017-07-11 PROCEDURE — 25000003 PHARM REV CODE 250: Performed by: EMERGENCY MEDICINE

## 2017-07-11 PROCEDURE — 99283 EMERGENCY DEPT VISIT LOW MDM: CPT | Mod: ,,, | Performed by: EMERGENCY MEDICINE

## 2017-07-11 PROCEDURE — 63600175 PHARM REV CODE 636 W HCPCS: Performed by: EMERGENCY MEDICINE

## 2017-07-11 PROCEDURE — 85025 COMPLETE CBC W/AUTO DIFF WBC: CPT

## 2017-07-11 PROCEDURE — 96361 HYDRATE IV INFUSION ADD-ON: CPT

## 2017-07-11 RX ORDER — PROCHLORPERAZINE EDISYLATE 5 MG/ML
10 INJECTION INTRAMUSCULAR; INTRAVENOUS
Status: COMPLETED | OUTPATIENT
Start: 2017-07-11 | End: 2017-07-11

## 2017-07-11 RX ORDER — ACETAMINOPHEN 500 MG
1000 TABLET ORAL
Status: COMPLETED | OUTPATIENT
Start: 2017-07-11 | End: 2017-07-11

## 2017-07-11 RX ADMIN — SODIUM CHLORIDE 1000 ML: 0.9 INJECTION, SOLUTION INTRAVENOUS at 05:07

## 2017-07-11 RX ADMIN — PROCHLORPERAZINE EDISYLATE 10 MG: 5 INJECTION INTRAMUSCULAR; INTRAVENOUS at 05:07

## 2017-07-11 RX ADMIN — ACETAMINOPHEN 1000 MG: 500 TABLET ORAL at 05:07

## 2017-07-11 NOTE — ED TRIAGE NOTES
"Pt c/o N/V and diarrhea onset x3 days ago. Pt reports currently x2 months pregnant (2nd pregnancy) and has not been able to keep any food down. Pt also reports bowel incontinence today - describes stools as "loose" - denies blood in stool. Pt also c/o mid and lower back pain - rates pain 10/10 at this time. Denies urinary symptoms. Denies CP or SOB.  "

## 2017-07-11 NOTE — ED PROVIDER NOTES
Encounter Date: 2017    SCRIBE #1 NOTE: I, Brandon Chinchilla, am scribing for, and in the presence of,  Beatrice Parr MD. I have scribed the entire note.       History     Chief Complaint   Patient presents with    Emesis During Pregnancy     vomiting and had diarrhea on myself, 9 weeks pregnant     Time seen by provider: 5:14 PM    This is a 26 y.o. female who presents for evaluation of vomiting during pregnancy. Associated sx include nausea, diarrhea, abdominal pain, back pain and inability to tolerate PO for several days duration. Pt  at 9 weeks gestation. States she did not have complications during last pregnancy. Pt is vomiting a lot more today than the last couple days. Denies any suprapubic abdominal cramping, blood in stool or vaginal bleeding. Had US on  which showed intrauterine pregnancy. Has tried Zofran every 6 hours and Phenergan oral without relief of sx. When the Phenergan is able to be swallowed she states it does help, but has been unable to tolerate PO.Pt states she does not want to be pregnant. Did not use protection during sex. Pt has child of her own who is with her father currently. Pt states she has good days and bad days. Man at bedside is reportedly aggravating and wants to keep the baby who is the biological father.  No further complaints or concerns at this time.         The history is provided by the patient and medical records.     Review of patient's allergies indicates:   Allergen Reactions    Amoxil [amoxicillin]     Orange juice Hives    Toradol [ketorolac] Nausea And Vomiting     Past Medical History:   Diagnosis Date    Chlamydia     Flu     UTI (lower urinary tract infection)     Yeast infection      Past Surgical History:   Procedure Laterality Date     SECTION      HERNIA REPAIR       Family History   Problem Relation Age of Onset    No Known Problems Mother     No Known Problems Father     Diabetes Other     Hypertension Other      Social  History   Substance Use Topics    Smoking status: Former Smoker    Smokeless tobacco: Never Used    Alcohol use Yes      Comment: occ     Review of Systems   Constitutional: Negative for fever.   HENT: Negative for sore throat.    Respiratory: Negative for shortness of breath.    Cardiovascular: Negative for chest pain.   Gastrointestinal: Positive for abdominal pain (but no suprapubic cramping), diarrhea, nausea and vomiting (with inability to tolerate PO). Negative for blood in stool.   Genitourinary: Negative for dysuria and vaginal bleeding.   Musculoskeletal: Positive for back pain.   Skin: Negative for rash.   Neurological: Negative for weakness.   Hematological: Does not bruise/bleed easily.       Physical Exam     Initial Vitals [07/11/17 1442]   BP Pulse Resp Temp SpO2   129/68 92 18 99.6 °F (37.6 °C) 97 %      MAP       88.33         Physical Exam    Nursing note and vitals reviewed.  Constitutional: She appears well-developed and well-nourished. No distress.   HENT:   Head: Normocephalic and atraumatic.   Eyes: EOM are normal. Pupils are equal, round, and reactive to light.   Neck: Normal range of motion. Neck supple.   Cardiovascular: Normal rate, regular rhythm, normal heart sounds and intact distal pulses.   No murmur heard.  Pulmonary/Chest: Breath sounds normal. No respiratory distress. She has no wheezes. She has no rhonchi. She has no rales.   Abdominal: Soft. She exhibits no distension. There is no tenderness. There is no rebound and no guarding.   Musculoskeletal: Normal range of motion. She exhibits no edema.   Neurological: She is alert and oriented to person, place, and time. She has normal strength.   Skin: Skin is warm and dry. No rash noted.         ED Course   Procedures  Labs Reviewed   CBC W/ AUTO DIFFERENTIAL - Abnormal; Notable for the following:        Result Value    Gran # 10.0 (*)     Gran% 86.3 (*)     Lymph% 9.5 (*)     Mono% 3.7 (*)     All other components within normal  limits             Medical Decision Making:   History:   Old Medical Records: I decided to obtain old medical records.  Old Records Summarized: other records.       <> Summary of Records:  Pt with no PMH.  at 9 weeks pregnancy. Had US  which showed intrauterine pregnancy.   Initial Assessment:   Pt who is  at 9 weeks pregnant complaining of N/V/D for one day as well as vomiting of pregnancy. Pt has tried Zofran, Phenergan orally with no improvement. Abdomen is benign. Sx are consistent with viral gastroenteritis. Will treat with IV fluids, Compazine, do labs and reassess.   Clinical Tests:   Lab Tests: Ordered and Reviewed            Scribe Attestation:   Scribe #1: I performed the above scribed service and the documentation accurately describes the services I performed. I attest to the accuracy of the note.    Attending Attestation:           Physician Attestation for Scribe:  Physician Attestation Statement for Scribe #1: I, Beatrice Parr MD, reviewed documentation, as scribed by Brandon hCinchilla in my presence, and it is both accurate and complete.         Attending ED Notes:   7:13 PM: CBC unremarkable. Pt's CMP hemolyzed. Pt wished to be discharged. Dd not want to finish IV fluids or have her blood work redrawn. Urinalysis still pending. Pt has phenergan and Zofran at home and feels better. Signed AMA paperwork aware of the risks of possible untreated infection. Return instructions were given.             ED Course     Clinical Impression:   The primary encounter diagnosis was Abdominal pain, unspecified location. A diagnosis of 9 weeks gestation of pregnancy was also pertinent to this visit.    Disposition:   Disposition: Discharged  Condition: Stable                        Beatrice Parr MD  [phone]

## 2017-07-12 NOTE — ED NOTES
Pt refused to have redraw of hemolized CMP.  States she wants to go home   notified  An AMA was signed

## 2017-07-26 DIAGNOSIS — Z36.82 ENCOUNTER FOR (NT) NUCHAL TRANSLUCENCY SCAN: Primary | ICD-10-CM

## 2017-08-03 ENCOUNTER — TELEPHONE (OUTPATIENT)
Dept: MATERNAL FETAL MEDICINE | Facility: CLINIC | Age: 27
End: 2017-08-03

## 2017-08-03 ENCOUNTER — HOSPITAL ENCOUNTER (OUTPATIENT)
Dept: PERINATAL CARE | Facility: HOSPITAL | Age: 27
Discharge: HOME | End: 2017-08-03
Attending: OBSTETRICS & GYNECOLOGY
Payer: MEDICAID

## 2017-08-03 DIAGNOSIS — Z36.82 ENCOUNTER FOR (NT) NUCHAL TRANSLUCENCY SCAN: ICD-10-CM

## 2017-08-03 DIAGNOSIS — Z98.891 H/O: C-SECTION: ICD-10-CM

## 2017-08-03 DIAGNOSIS — N88.3 INCOMPETENT CERVIX: Primary | ICD-10-CM

## 2017-08-03 PROCEDURE — 99215 OFFICE O/P EST HI 40 MIN: CPT | Mod: S$PBB,25,TH, | Performed by: OBSTETRICS & GYNECOLOGY

## 2017-08-03 PROCEDURE — 76801 OB US < 14 WKS SINGLE FETUS: CPT

## 2017-08-03 PROCEDURE — 76801 OB US < 14 WKS SINGLE FETUS: CPT | Mod: 26,S$PBB,, | Performed by: OBSTETRICS & GYNECOLOGY

## 2017-08-03 NOTE — TELEPHONE ENCOUNTER
Pt notified that cerclage has been scheduled for Tuesday, August 8, 2017 at 7:30 am . Pt instructed to report to the 6th floor L&D of [facility] located at [address] [place] [address] at 6am per Dr Orozco instructions. Pt verbalized understanding of information.

## 2017-08-04 DIAGNOSIS — Z98.890 HISTORY OF CERVICAL CERCLAGE: Primary | ICD-10-CM

## 2017-08-04 NOTE — PROGRESS NOTES
"Indication  ========    First trimester screening.    Method  ======    Transabdominal ultrasound examination. View: Good view.    Pregnancy  =========    Hearn pregnancy. Number of fetuses: 1.    Dating  ======    Cycle: regular cycle  GA by "stated dating" 12 w + 2 d  VICKI by "stated dating": 2018  Ultrasound examination on: 8/3/2017  GA by U/S based upon: CRL  GA by U/S 11 w + 5 d  VICKI by U/S: 2018  Assigned: Dating performed on 08/3/2017, based on the external assessment  Assigned GA 12 w + 2 d  Assigned VICKI: 2018    General Evaluation  ==============    Cardiac activity: present.  Amniotic fluid: normal amount.    Fetal Biometry  ============    CRL 49.6 mm 11w 5d Hadlock  NT 1.1 mm   bpm    Fetal Anatomy  ============    The following structures appear normal:  Cranium. Neck. Thorax. Abdominal wall.    The following structures were visualized:  GI tract. Urogenital tract. Arms. Legs.    Maternal Structures  ===============    Uterus / Cervix  Uterus: Normal  Ovaries / Tubes / Adnexa  Rt ovary: Visualized  Rt ovary D1 2.9 cm  Rt ovary D2 1.9 cm  Rt ovary D3 2.0 cm  Rt ovary mean 2.3 cm  Rt ovary vol 5.7 cm³  Rt ovarian corpus luteum: visualized  Rt ovarian corpus luteum D1 13.0 mm  Rt ovarian corpus luteum D2 14.0 mm  Rt ovarian corpus luteum D3 9.0 mm  Lt ovary: Visualized  Lt ovary D1 2.7 cm  Lt ovary D2 1.3 cm  Lt ovary D3 0.9 cm  Lt ovary mean 1.6 cm  Lt ovary vol 1.7 cm³  Pouch of Berto / Other Structures  Cul de Sac: Visualized    Consultation  ==========    Type: Possible cerclage.  Ms. Myers is a 28y/o  at 12 weeks and 2 days who presents for consult regarding a possible cerclage.  The patient was seen by M with her last pregnancy approximately 5 years ago when she had an abnormal quad screen and was also found  to have a short cervix and was on vaginal progesterone. Her cervix shortened further and she only reported a rare contraction and she was  admitted to the " hospital and given betamethasone. At one point she was found to have hourglassing membranes. Subsequent to this, the  patient developed pelvic pressure and abdominal pain and heavy vaginal bleeding. She underwent a classical csection by Wily Contreras and  Devang and was found to have an abruption. Hypertension was not documented in the operative report but at discharge it mentioned her blood  pressures were 140s over 80s. The patient denies hypertension. Her blood pressure here was normal. She did have a 120/90 earlier in the  pregnancy. She reports that she has lost 15 to 20 pounds this pregnancy and vomits once to twice daily.    PMHx: None  PSHx: Classical csection, umbilical hernia repairx2  Med: PNV, promethazine, zofran,  Allergies: Toradol, orange juice, amoxicillin  Family history: No history of sickle cell, her sister had a short cervix and  delivery and baby  and her sister had a cerclage with her  subsequent pregnancy  Social: Denies smoking, alcohol, illicit drug use    Hgb 13.9, Plts 340, Normal hgb electrophoresis, O positive, antibody screen neg    Bp 132/65  SSE: able to see the os and anterior lip of the cervix, exam limited by body habitus, on digital exam cervix closed and about 1.5cm in length to  vaginal fornix; palpable anterior and posterior lip of cervix      1. IUP at 12 and 2    2. Prior classical csection: Given the increased risk of uterine rupture, a repeat csection is warranted at 36 to 37 weeks gestation. If delivering  prior to 37 weeks gestation, betamethasone should be considered. Risks of multiple csections were briefly reviewed. The patient has also had  2 hernia repairs near her umbilicus.    3. History of  delivery: The patient's history is slightly confusing. She has components of both  labor and cervical insufficiency.  She began with bulging membranes and later felt pressure and contractions but also had an abruption. I would recommend [facility]  injections  weekly from 16 to 36 weeks to attempt to decrease the risk of  birth, however we discussed it may not be as beneficial the contractions  were due to abruption. She does meet criteria to have a history indicated prophylactic cerclage at 12 to 14 weeks gestation based on her  hourglassing membranes in the past. If she is opposed to having a cerclage again, we discussed that serial cervical length screening would be  recommended every 2 weeks from 16 to 24 weeks. If the cervix shortened to 2.5 to 2.9mm, then weekly cervical lengths would be  recommended. If the cervix shortened to less than 2.5cm, cerclage would be recommended due to the shortening. We did discuss that  clinically her cervix already feels short. We discussed the concerns of the cervix shortening or dilating to a point that it can be difficult to place  a rescue cerclage. The risks, benefits, alternatives, and limitations of cerclage were reviewed with the patient including but not limited to  bleeding, infection, death, injury to bowel, bladder, and other organs, anesthesia, PPROM, pregnancy loss, failure to be able to place, failure of  cerclage to work,  delivery, cervical tearing, and other complications. Based on the patient's cervix, the type of vaginal cerclage will  need addressed in the OR. The patient has elected to proceed with cerclage. Please also arrange for progesterone injections through your  office if the patient is amenable.    5. Obesity-Obesity can lead to adverse pregnancy outcomes. A GDM screen is recommended.    6. HTN: The patient denies chronic hypertension. Her blood pressure is normal today but she did have an elevation earlier in pregnancy. We  discussed HTN and risk of abruption.    7. History abruption:We discussed that the incidence of abruption is approximately 1% and the recurrence rate is somewhere between 10 to  20 fold higher for someone with a prior abruption. Some studies have shown a 3-15  percent risk of recurrence. Another quoted study listed a 6  percent risk of recurrence.  With an abruption severe enough to cause demise, one study noted a 7 percent recurrence risk. We discussed underlying etiologies of  abruption such as hypertension, smoking, trauma, cocaine, PPROM and other causes. We discussed that preeclampsia and  hypertensive disorders of pregnancy can lead to abruption. I would recommend checking a baseline set of preeclampsia labs and a urine p/c  or  24 hour urine protein to have baseline values in the event that there are blood pressure concerns that evolve. We discussed that unfortunately  we cannot predict abruption. While abruption cannot necessarily be predicted by  testing, at times we can  notice changes in the fetal status. Typically, thromobophilia panels are not helpful in cases of abruption. ACOG does not recommend testing  women for inherited thrombophilias who had an abruption because it is not clear that anticoagulation decreases recurrence risk. Per ACOG,  there is also not enough evidence to support a relationship between thrombophilias and abruption. Traditonally, testing for antiphospholipid  antibody syndrome is not performed for abruption. I would consider a baby aspirin daily after 14 weeks.    8. Genetic screening: Options of sequential screening, NIPT, amniocentesis and CVS were reviewed, as well as the limitations. She elected for  sequential screening.    9. I would recommend considering vitamin B6 and pedialyte or boost supplements.      ADDENDUM:  I contacted Dr. Enriquez but was unable to speak to her before the patient had to leave [facility] for her appointment in Dr. Enriquez's office. The patient  was uncertain if Dr. Enriquez or Tyra were going to place the cerclage. The patient was advised that she needed to have the first portion of her  sequential screen drawn today. When I reached Dr. Enriquez, we discussed the case and she desired for [facility] to place the cerclage.  The patient  was contacted by our office staff for cerclage placement at 0730 on 17. She was advised that this would be done at [facility], she would  need to be NPO after midnight, and arrive at the designated time pre procedure for preparation. I did not schedule the patient for her anatomic  survey or follow up ultrasounds. We would be happy to perform those if desired.    I also noted when she was 's office that she had another diastolic blood pressure elevation today. I would recommend treating her as  though she as chronic hypertension based on this.    CHTN-She should be counseled on maternal/fetal risks associated with CHTN during pregnancy including but not limited to fetal growth  restriction,  miscarriage,  delivery, development of superimposed preeclampsia, abruption and eclampsia. She should be counseled on the  recommendations for blood pressure control, serial sonos for fetal growth, and timing of delivery. She should be aware of the potential maternal  consequences of chronic hypertension    A. Recommend a fetal anatomical survey at 19 to 20 week and ultrasounds to assess interval growth. We would be happy to perform if  desired.    B. Serial sonos for fetal growth starting at 24 wks gestation.    C. Baseline labs: CMP, CBC, Protein/creatinine ratio or 24 hour urine for protein and creatinine clearance are recommended    D. Glucose screen now and if negative repeat at 24-28 wks gestation    E. Close monitoring of patient's blood pressures:  If BP < 160 mmHg systolic or 105 mmHg diastolic and no evidence of end-organ damage, no antihypertensive therapy suggested    If patient on antihypertensive medication, it is suggested that BP levels be maintained between 120 mmHg systolic and 80mmHg diastolic  and  160 mmHg systolic and 105 mmHg diastolic    F. Fetal surveillance recommended (ex. NST twice a week and weekly MVP) if CHTN complicated by the need for medication,  other  underlying  medical conditions that affect fetal outcome, or any evidence of fetal growth restriction, and superimposed preeclampsia to begin at 32 weeks  of gestation or earlier if indicated      A total of 45 minutes was spent in face to face time with greater than half of that time spent in counseling and coordination of care.        Impression  =========    Hearn live intrauterine pregnancy.  Biometry agrees with the clinical dating.  NT measures 1.1mm.  Corpus luteum right ovary.  Limited anatomy appears normal.    Recommendation  ==============    Follow up to be determined after cerclage placement.

## 2017-08-08 ENCOUNTER — ANESTHESIA EVENT (OUTPATIENT)
Dept: OBSTETRICS AND GYNECOLOGY | Facility: OTHER | Age: 27
End: 2017-08-08
Payer: MEDICAID

## 2017-08-08 ENCOUNTER — ANESTHESIA (OUTPATIENT)
Dept: OBSTETRICS AND GYNECOLOGY | Facility: OTHER | Age: 27
End: 2017-08-08
Payer: MEDICAID

## 2017-08-08 ENCOUNTER — HOSPITAL ENCOUNTER (INPATIENT)
Facility: OTHER | Age: 27
LOS: 1 days | Discharge: HOME | End: 2017-08-08
Attending: OBSTETRICS & GYNECOLOGY | Admitting: OBSTETRICS & GYNECOLOGY
Payer: MEDICAID

## 2017-08-08 VITALS
TEMPERATURE: 97 F | SYSTOLIC BLOOD PRESSURE: 144 MMHG | BODY MASS INDEX: 38.09 KG/M2 | DIASTOLIC BLOOD PRESSURE: 68 MMHG | OXYGEN SATURATION: 100 % | HEIGHT: 66 IN | HEART RATE: 81 BPM | WEIGHT: 237 LBS

## 2017-08-08 DIAGNOSIS — N88.3 INCOMPETENT CERVIX: Primary | ICD-10-CM

## 2017-08-08 PROBLEM — O34.30 MCDONALD CERCLAGE PRESENT, ANTEPARTUM: Status: ACTIVE | Noted: 2017-08-08

## 2017-08-08 LAB
ABO + RH BLD: NORMAL
BASOPHILS # BLD AUTO: 0.01 K/UL
BASOPHILS NFR BLD: 0.1 %
BLD GP AB SCN CELLS X3 SERPL QL: NORMAL
DIFFERENTIAL METHOD: ABNORMAL
EOSINOPHIL # BLD AUTO: 0.3 K/UL
EOSINOPHIL NFR BLD: 2.4 %
ERYTHROCYTE [DISTWIDTH] IN BLOOD BY AUTOMATED COUNT: 13.3 %
HCT VFR BLD AUTO: 36.4 %
HGB BLD-MCNC: 12.5 G/DL
LYMPHOCYTES # BLD AUTO: 3 K/UL
LYMPHOCYTES NFR BLD: 25.9 %
MCH RBC QN AUTO: 29.2 PG
MCHC RBC AUTO-ENTMCNC: 34.3 G/DL
MCV RBC AUTO: 85 FL
MONOCYTES # BLD AUTO: 0.9 K/UL
MONOCYTES NFR BLD: 7.6 %
NEUTROPHILS # BLD AUTO: 7.3 K/UL
NEUTROPHILS NFR BLD: 63.8 %
PLATELET # BLD AUTO: 294 K/UL
PMV BLD AUTO: 10.5 FL
RBC # BLD AUTO: 4.28 M/UL
WBC # BLD AUTO: 11.45 K/UL

## 2017-08-08 PROCEDURE — 11000001 HC ACUTE MED/SURG PRIVATE ROOM

## 2017-08-08 PROCEDURE — 25000003 PHARM REV CODE 250: Performed by: STUDENT IN AN ORGANIZED HEALTH CARE EDUCATION/TRAINING PROGRAM

## 2017-08-08 PROCEDURE — 36000683 *HC CERCLAGE PLACEMENT

## 2017-08-08 PROCEDURE — 86900 BLOOD TYPING SEROLOGIC ABO: CPT

## 2017-08-08 PROCEDURE — 25000003 PHARM REV CODE 250: Performed by: OBSTETRICS & GYNECOLOGY

## 2017-08-08 PROCEDURE — 63600175 PHARM REV CODE 636 W HCPCS: Performed by: STUDENT IN AN ORGANIZED HEALTH CARE EDUCATION/TRAINING PROGRAM

## 2017-08-08 PROCEDURE — 51702 INSERT TEMP BLADDER CATH: CPT

## 2017-08-08 PROCEDURE — 36000681 HC C/S TUBAL LIGATION LEVEL II

## 2017-08-08 PROCEDURE — 37000009 HC ANESTHESIA EA ADD 15 MINS: Performed by: OBSTETRICS & GYNECOLOGY

## 2017-08-08 PROCEDURE — 27200688 HC TRAY, SPINAL-HYPER/ ISOBARIC: Performed by: ANESTHESIOLOGY

## 2017-08-08 PROCEDURE — 36415 COLL VENOUS BLD VENIPUNCTURE: CPT

## 2017-08-08 PROCEDURE — S0028 INJECTION, FAMOTIDINE, 20 MG: HCPCS | Performed by: OBSTETRICS & GYNECOLOGY

## 2017-08-08 PROCEDURE — 37000008 HC ANESTHESIA 1ST 15 MINUTES: Performed by: OBSTETRICS & GYNECOLOGY

## 2017-08-08 PROCEDURE — 86850 RBC ANTIBODY SCREEN: CPT

## 2017-08-08 PROCEDURE — 0UVC7ZZ RESTRICTION OF CERVIX, VIA NATURAL OR ARTIFICIAL OPENING: ICD-10-PCS | Performed by: OBSTETRICS & GYNECOLOGY

## 2017-08-08 PROCEDURE — 59320 REVISION OF CERVIX: CPT | Mod: ,,, | Performed by: OBSTETRICS & GYNECOLOGY

## 2017-08-08 PROCEDURE — D9220A PRA ANESTHESIA: Mod: ,,, | Performed by: ANESTHESIOLOGY

## 2017-08-08 PROCEDURE — 51701 INSERT BLADDER CATHETER: CPT

## 2017-08-08 PROCEDURE — 85025 COMPLETE CBC W/AUTO DIFF WBC: CPT

## 2017-08-08 RX ORDER — CHLOROPROCAINE HYDROCHLORIDE 30 MG/ML
INJECTION, SOLUTION EPIDURAL; INFILTRATION; INTRACAUDAL; PERINEURAL
Status: DISCONTINUED | OUTPATIENT
Start: 2017-08-08 | End: 2017-08-08

## 2017-08-08 RX ORDER — FENTANYL CITRATE 50 UG/ML
INJECTION, SOLUTION INTRAMUSCULAR; INTRAVENOUS
Status: DISCONTINUED | OUTPATIENT
Start: 2017-08-08 | End: 2017-08-08

## 2017-08-08 RX ORDER — OXYCODONE AND ACETAMINOPHEN 5; 325 MG/1; MG/1
1 TABLET ORAL ONCE
Status: DISCONTINUED | OUTPATIENT
Start: 2017-08-08 | End: 2017-08-08 | Stop reason: HOSPADM

## 2017-08-08 RX ORDER — FAMOTIDINE 10 MG/ML
20 INJECTION INTRAVENOUS
Status: DISCONTINUED | OUTPATIENT
Start: 2017-08-08 | End: 2017-08-08 | Stop reason: HOSPADM

## 2017-08-08 RX ORDER — SODIUM CITRATE AND CITRIC ACID MONOHYDRATE 334; 500 MG/5ML; MG/5ML
30 SOLUTION ORAL
Status: DISCONTINUED | OUTPATIENT
Start: 2017-08-08 | End: 2017-08-08 | Stop reason: HOSPADM

## 2017-08-08 RX ORDER — MIDAZOLAM HYDROCHLORIDE 1 MG/ML
INJECTION INTRAMUSCULAR; INTRAVENOUS
Status: DISCONTINUED | OUTPATIENT
Start: 2017-08-08 | End: 2017-08-08

## 2017-08-08 RX ORDER — SODIUM CHLORIDE, SODIUM LACTATE, POTASSIUM CHLORIDE, CALCIUM CHLORIDE 600; 310; 30; 20 MG/100ML; MG/100ML; MG/100ML; MG/100ML
INJECTION, SOLUTION INTRAVENOUS CONTINUOUS
Status: DISCONTINUED | OUTPATIENT
Start: 2017-08-08 | End: 2017-08-08 | Stop reason: HOSPADM

## 2017-08-08 RX ORDER — INDOMETHACIN 25 MG/1
50 CAPSULE ORAL ONCE
Status: COMPLETED | OUTPATIENT
Start: 2017-08-08 | End: 2017-08-08

## 2017-08-08 RX ORDER — ONDANSETRON 2 MG/ML
INJECTION INTRAMUSCULAR; INTRAVENOUS
Status: DISCONTINUED | OUTPATIENT
Start: 2017-08-08 | End: 2017-08-08

## 2017-08-08 RX ADMIN — ONDANSETRON 4 MG: 2 INJECTION INTRAMUSCULAR; INTRAVENOUS at 09:08

## 2017-08-08 RX ADMIN — SODIUM CITRATE AND CITRIC ACID MONOHYDRATE 30 ML: 500; 334 SOLUTION ORAL at 07:08

## 2017-08-08 RX ADMIN — FAMOTIDINE 20 MG: 10 INJECTION, SOLUTION INTRAVENOUS at 07:08

## 2017-08-08 RX ADMIN — MIDAZOLAM HYDROCHLORIDE 1 MG: 1 INJECTION, SOLUTION INTRAMUSCULAR; INTRAVENOUS at 09:08

## 2017-08-08 RX ADMIN — FENTANYL CITRATE 10 MCG: 50 INJECTION, SOLUTION INTRAMUSCULAR; INTRAVENOUS at 09:08

## 2017-08-08 RX ADMIN — CHLOROPROCAINE HYDROCHLORIDE 1.5 ML: 30 INJECTION, SOLUTION EPIDURAL; INFILTRATION; INTRACAUDAL; PERINEURAL at 09:08

## 2017-08-08 RX ADMIN — SODIUM CHLORIDE, SODIUM LACTATE, POTASSIUM CHLORIDE, AND CALCIUM CHLORIDE: 600; 310; 30; 20 INJECTION, SOLUTION INTRAVENOUS at 09:08

## 2017-08-08 RX ADMIN — INDOMETHACIN 50 MG: 25 CAPSULE ORAL at 10:08

## 2017-08-08 NOTE — OP NOTE
Operative Note       Surgery Date: 08/08/2017    Surgeon(s) and Role:    * Lynn Siddiqi MD - Primary    * Tamara Ochoa MD - Assistant    Pre-op Diagnosis:    1. History indicated cerclage  2. Cervical incompetence  3. Poor obstetric history    Post-op Diagnosis:  Same    Procedure(s) (LRB):  ENCERCLAGE (N/A)    Anesthesia: Spinal    Findings/Key Components:  Cerclage placed without difficuly    Procedure in Detail:  The patient was taken to the OR where spinal anesthesia was found to be adequate.  She was placed in the dorsal lithotomy position. The transvaginal ultrasound was used to verify FHTs at 160 bpm and to visualize the cervix. The patient was then prepped and draped in the normal sterile fashion. Her bladder was drained with an in-and-out catheter in a sterile manner (about 50 cc of urine).  A weighted speculum was inserted into the posterior vagina and a right angle was used to visualize the cervix.  The anterior lip of the cervix was short in appearance and grasped with the ring forceps.  A #1 prolene was inserted through the 1 o'clock position of the cervix with careful attention to avoid the bladder.  The suture was the continued around the cervix at the 10 o'clock, 7 o'clock, and 4 o'clock positions (counter clockwise purse string closure).  The suture was tied with multiple knots with a long tail remaining.  The cervix was noted to be hemostatic, and the os was noted to be closed at the end of the procedure.   The weighted speculum and ring forceps were removed.  The patient was taken out of the dorsal lithotomy position. Abdominal ultrasound used to verify FHTs 153bpm post procedure.  She tolerated the procedure well.  Instrument and lap counts were correct times two.  She was transferred to recovery in stable condition.    Dr. Siddiqi was present for the entire procedure.       Estimated Blood Loss: 25 mL           Specimens     None        Output:  Urine: 50 mL        Complications: None            Disposition: PACU - hemodynamically stable.           Condition: Stable    Tamara Ochoa MD  OB/GYN, PGY-2

## 2017-08-08 NOTE — TRANSFER OF CARE
"Anesthesia Transfer of Care Note    Patient: Catina Myers    Procedure(s) Performed: Procedure(s) (LRB):  ENCERCLAGE (N/A)    Patient location: Labor and Delivery    Anesthesia Type: spinal    Transport from OR: Transported from OR on room air with adequate spontaneous ventilation    Post pain: adequate analgesia    Post assessment: no apparent anesthetic complications    Post vital signs: stable    Level of consciousness: awake, alert and oriented    Nausea/Vomiting: nausea and no vomiting    Complications: none          Last vitals:   Visit Vitals  BP (!) 102/51   Pulse 73   Temp 35.8 °C (96.5 °F)   Ht 5' 6" (1.676 m)   Wt 107.5 kg (237 lb)   LMP 05/09/2017 (Exact Date)   Breastfeeding? No   BMI 38.25 kg/m²     "

## 2017-08-08 NOTE — HOSPITAL COURSE
08/08/2017 - Patient presented as scheduled for history indicated cerclage. The procedure was performed without complications. FHTs were verified with bedside US pre- and post-procedure (160 bpm, 153 bpm respectively). The patient was taken to recovery in stable condition. She complained of cramping post-operatively for which she got indocin and percocet with subsequent improvement. Speculum exam at that time demonstrated healthy pink cervix with no bleeding noted. She was discharged after she was able to tolearte PO and void and ambulate independently. She has follow-up scheduled with MFM and primary OB.

## 2017-08-08 NOTE — ANESTHESIA POSTPROCEDURE EVALUATION
"Anesthesia Post Evaluation    Patient: Catina Myers    Procedure(s) Performed: Procedure(s) (LRB):  ENCERCLAGE (N/A)    Final Anesthesia Type: spinal  Patient location during evaluation: labor & delivery  Patient participation: Yes- Able to Participate  Level of consciousness: awake and alert and oriented  Post-procedure vital signs: reviewed and stable  Pain management: adequate  Airway patency: patent  PONV status at discharge: nausea (controlled) and vomiting (controlled)  Anesthetic complications: no      Cardiovascular status: blood pressure returned to baseline and stable  Respiratory status: unassisted, spontaneous ventilation and room air  Hydration status: euvolemic  Follow-up not needed.        Visit Vitals  BP (!) 144/68   Pulse 81   Temp 35.9 °C (96.7 °F)   Ht 5' 6" (1.676 m)   Wt 107.5 kg (237 lb)   LMP 05/09/2017 (Exact Date)   SpO2 100%   Breastfeeding? No   BMI 38.25 kg/m²       Pain/Roxana Score: Pain Rating Prior to Med Admin: 4 (8/8/2017 12:29 PM)      "

## 2017-08-08 NOTE — HPI
Catina Myers is a 27 y.o. [id number] at 13w0d presents as scheduled for history-indicated cerclage. Ms. Myers has a history of short cervix with hour-glassing membranes with last pregnancy, however she subsequently developed placental abruption and was delivered via classical .   This morning she is feeling well. She denies cramping, vaginal bleeding, leaking fluid, abnormal discharge.

## 2017-08-08 NOTE — SUBJECTIVE & OBJECTIVE
Obstetric HPI:    Obstetric History       T0      L1     SAB0   TAB0   Ectopic0   Multiple0   Live Births1       # Outcome Date GA Lbr Michele/2nd Weight Sex Delivery Anes PTL Lv   2 Current            1  12 24w0d  0.55 kg (1 lb 3.4 oz) F CS-Classical  N CAIN      Name: Zachary Severino      Apgar1:  1                Apgar5: 1        Past Medical History:   Diagnosis Date    Chlamydia     Flu     UTI (lower urinary tract infection)     Yeast infection      Past Surgical History:   Procedure Laterality Date     SECTION      HERNIA REPAIR         PTA Medications   Medication Sig    ferrous sulfate 325 mg (65 mg iron) Tab tablet Take 1 tablet (325 mg total) by mouth once daily.    hydrocortisone 1 % cream Apply to affected area 2 times daily    ondansetron (ZOFRAN) 8 MG tablet Take 1 tablet (8 mg total) by mouth every 6 (six) hours as needed for Nausea.    PNV with Ca,no.74-iron-FA 27-1 mg Tab Take 1 tablet by mouth once daily.    prenatal vitamin #49-iron-FA 6.75 mg iron- 200 mcg Tab Take 1 tablet by mouth once daily.       Review of patient's allergies indicates:   Allergen Reactions    Amoxil [amoxicillin]     Orange juice Hives    Toradol [ketorolac] Nausea And Vomiting        Family History     Problem Relation (Age of Onset)    Diabetes Other    Hypertension Other    No Known Problems Mother, Father        Social History Main Topics    Smoking status: Former Smoker    Smokeless tobacco: Never Used    Alcohol use Yes      Comment: occ    Drug use: No    Sexual activity: Yes     Birth control/ protection: None     Review of Systems   Constitutional: Negative for chills and fever.   Respiratory: Negative for cough and shortness of breath.    Cardiovascular: Negative for chest pain and leg swelling.   Gastrointestinal: Positive for nausea. Negative for abdominal pain and vomiting.   Genitourinary: Negative for dysuria, pelvic pain, vaginal bleeding and vaginal  discharge.   Musculoskeletal: Negative for back pain.   Neurological: Negative for headaches.      Objective:     Vital Signs (Most Recent):  Temp: 96.5 °F (35.8 °C) (08/08/17 0635)  Pulse: 73 (08/08/17 0635)  BP: 135/77 (08/08/17 0635) Vital Signs (24h Range):  Temp:  [96.5 °F (35.8 °C)] 96.5 °F (35.8 °C)  Pulse:  [73] 73  BP: (135)/(77) 135/77     Weight: 107.5 kg (237 lb)  Body mass index is 38.25 kg/m².    FHT: 162 bpm    Physical Exam:   Constitutional: She is oriented to person, place, and time. She appears well-developed and well-nourished. No distress.       Cardiovascular: Normal rate and regular rhythm.     Pulmonary/Chest: Effort normal.        Abdominal: Soft. She exhibits no distension. There is no tenderness.             Musculoskeletal: She exhibits no edema.       Neurological: She is alert and oriented to person, place, and time.    Skin: Skin is warm and dry.    Psychiatric: She has a normal mood and affect.     Significant Labs:  Lab Results   Component Value Date    GROUPTRH O POS 06/19/2017    HEPBSAG NR 06/22/2017    STREPBCULT NO BETA HEMOLYTIC STREPTOCOCCUS FOUND 05/24/2012       I have personallly reviewed all pertinent lab results from the last 24 hours.

## 2017-08-08 NOTE — H&P
[facility]  Obstetrics  History & Physical    Patient Name: Catina Myers  MRN: [id number]  Admission Date: 2017  Primary Care Provider: Kishore Gaspar MD    Subjective:     Principal Problem: History of cervical incompetence     History of Present Illness:  Catina Myers is a 27 y.o. [id number] at 13w0d presents as scheduled for history-indicated cerclage. Ms. Myers has a history of short cervix with hour-glassing membranes with last pregnancy, however she subsequently developed placental abruption and was delivered via classical .   This morning she is feeling well. She denies cramping, vaginal bleeding, leaking fluid, abnormal discharge.     Obstetric HPI:    Obstetric History       T0      L1     SAB0   TAB0   Ectopic0   Multiple0   Live Births1       # Outcome Date GA Lbr Michele/2nd Weight Sex Delivery Anes PTL Lv   2 Current            1  12 24w0d  0.55 kg (1 lb 3.4 oz) F CS-Classical  N CAIN      Name: Zachary Severino      Apgar1:  1                Apgar5: 1        Past Medical History:   Diagnosis Date    Chlamydia     Flu     UTI (lower urinary tract infection)     Yeast infection      Past Surgical History:   Procedure Laterality Date     SECTION      HERNIA REPAIR         PTA Medications   Medication Sig    ferrous sulfate 325 mg (65 mg iron) Tab tablet Take 1 tablet (325 mg total) by mouth once daily.    hydrocortisone 1 % cream Apply to affected area 2 times daily    ondansetron (ZOFRAN) 8 MG tablet Take 1 tablet (8 mg total) by mouth every 6 (six) hours as needed for Nausea.    PNV with Ca,no.74-iron-FA 27-1 mg Tab Take 1 tablet by mouth once daily.    prenatal vitamin #49-iron-FA 6.75 mg iron- 200 mcg Tab Take 1 tablet by mouth once daily.       Review of patient's allergies indicates:   Allergen Reactions    Amoxil [amoxicillin]     Orange juice Hives    Toradol [ketorolac] Nausea And Vomiting        Family History     Problem Relation  (Age of Onset)    Diabetes Other    Hypertension Other    No Known Problems Mother, Father        Social History Main Topics    Smoking status: Former Smoker    Smokeless tobacco: Never Used    Alcohol use Yes      Comment: occ    Drug use: No    Sexual activity: Yes     Birth control/ protection: None     Review of Systems   Constitutional: Negative for chills and fever.   Respiratory: Negative for cough and shortness of breath.    Cardiovascular: Negative for chest pain and leg swelling.   Gastrointestinal: Positive for nausea. Negative for abdominal pain and vomiting.   Genitourinary: Negative for dysuria, pelvic pain, vaginal bleeding and vaginal discharge.   Musculoskeletal: Negative for back pain.   Neurological: Negative for headaches.      Objective:     Vital Signs (Most Recent):  Temp: 96.5 °F (35.8 °C) (17 0635)  Pulse: 73 (17 0635)  BP: 135/77 (17 0635) Vital Signs (24h Range):  Temp:  [96.5 °F (35.8 °C)] 96.5 °F (35.8 °C)  Pulse:  [73] 73  BP: (135)/(77) 135/77     Weight: 107.5 kg (237 lb)  Body mass index is 38.25 kg/m².    FHT: 162 bpm    Physical Exam:   Constitutional: She is oriented to person, place, and time. She appears well-developed and well-nourished. No distress.       Cardiovascular: Normal rate and regular rhythm.     Pulmonary/Chest: Effort normal.        Abdominal: Soft. She exhibits no distension. There is no tenderness.             Musculoskeletal: She exhibits no edema.       Neurological: She is alert and oriented to person, place, and time.    Skin: Skin is warm and dry.    Psychiatric: She has a normal mood and affect.     Significant Labs:  Lab Results   Component Value Date    GROUPTRH O POS 2017    HEPBSAG NR 2017    STREPBCULT NO BETA HEMOLYTIC STREPTOCOCCUS FOUND 2012       I have personallly reviewed all pertinent lab results from the last 24 hours.    Assessment/Plan:     27 y.o. female  at 13w0d for:    Incompetent cervix     - history of short cervix/hour glassing membranes in last pregnancy  - consents signed for cerclage and blood transfusion, risks/benefits/alternatives reviewed in detail  - FHTs verified 162 bpm  - will verify post-procedure as well  - will need 17 OHP weekly at 16 weeks  - to OR this morning for history-indicated cerclage            Tamara Knox MD  Obstetrics  [facility]

## 2017-08-08 NOTE — ANESTHESIA PROCEDURE NOTES
Spinal    Diagnosis: cerclage  Patient location during procedure: OR  Start time: 8/8/2017 9:10 AM  Timeout: 8/8/2017 9:10 AM  End time: 8/8/2017 9:17 AM  Staffing  Anesthesiologist: MIGUE ARVIZU  Resident/CRNA: CANDACE MOHAN  Performed: resident/CRNA   Preanesthetic Checklist  Completed: patient identified, surgical consent, pre-op evaluation, timeout performed, IV checked, risks and benefits discussed and monitors and equipment checked  Spinal Block  Patient position: sitting  Prep: ChloraPrep  Patient monitoring: heart rate, cardiac monitor and continuous pulse ox  Approach: midline  Location: L3-4  Injection technique: single shot  CSF Fluid: clear free-flowing CSF  Needle  Needle type: Emmanuel   Needle gauge: 25 G  Needle length: 5 in  Additional Documentation: incremental injection  Needle localization: anatomical landmarks  Assessment  Sensory level: T8   Dermatomal levels determined by pinch or prick  Ease of block: easy  Patient's tolerance of the procedure: comfortable throughout block  Medications:  Bolus administered: 1.5 mL of 3.0 chloroprocaine  Opioid administered: 10 mcg of   fentanyl

## 2017-08-08 NOTE — ANESTHESIA PREPROCEDURE EVALUATION
2017    Pre-operative evaluation for ENCERCLAGE (N/A)    Catina Myers is a 27 y.o.  at 13w0d with pmhx of cervical insufficiency resulting in pre- term delivery at 24wks. She is now presenting for procedure noted above.     OB History    Para Term  AB Living   2 1 0 1 0 1   SAB TAB Ectopic Multiple Live Births   0 0 0 0 1      # Outcome Date GA Lbr Michele/2nd Weight Sex Delivery Anes PTL Lv   2 Current            1  12 24w0d  0.55 kg (1 lb 3.4 oz) F CS-Classical  N CAIN      Birth Comments: Abruption,transverse lie and hour glassing membranes transfer of care from         Past Medical History:   Diagnosis Date    Chlamydia     Flu     UTI (lower urinary tract infection)     Yeast infection        Past Surgical History:   Procedure Laterality Date     SECTION      HERNIA REPAIR           Vital Signs Range (Last 24H):         CBC:     Recent Labs  Lab 17  1744   WBC 11.53   RBC 4.75   HGB 13.9   HCT 39.4      MCV 83   MCH 29.3   MCHC 35.3       CMP: No results for input(s): NA, K, CL, CO2, BUN, CREATININE, GLU, MG, PHOS, CALCIUM, ALBUMIN, PROT, ALKPHOS, ALT, AST, BILITOT in the last 720 hours.    INR:  No results for input(s): INR, PROTIME, APTT in the last 720 hours.    Invalid input(s): PT      Anesthesia Evaluation    I have reviewed the Patient Summary Reports.     I have reviewed the Medications.     Review of Systems  Anesthesia Hx:  No problems with previous Anesthesia Denies Hx of Anesthetic complications  Neg history of prior surgery. Denies Family Hx of Anesthesia complications.   Denies Personal Hx of Anesthesia complications.   Cardiovascular:   Denies MI.  Denies CAD.     Denies Angina.        Pulmonary:   Denies Pneumonia Denies COPD.  Denies Asthma.  Denies Shortness of breath.    Renal/:   Denies Chronic Renal Disease.      Hepatic/GI:   Denies GERD.    Neurological:   Denies TIA. Denies CVA.    Endocrine:   Denies Diabetes.        Physical Exam  General:  Well nourished    Airway/Jaw/Neck:  Airway Findings: Mouth Opening: Normal Tongue: Normal  General Airway Assessment: Adult  Mallampati: III  Improves to II with phonation.  TM Distance: Normal, at least 6 cm  Jaw/Neck Findings:  Micrognathia: Negative Mandibular Fracture: Negative    Neck ROM: Normal ROM     Eyes/Ears/Nose:  EYES/EARS/NOSE FINDINGS: Normal   Dental:  Dental Findings: In tact   Chest/Lungs:  Chest/Lungs Findings: Clear to auscultation, Normal Respiratory Rate     Heart/Vascular:  Heart Findings: Rate: Normal  Rhythm: Regular Rhythm  Sounds: Normal        Mental Status:  Mental Status Findings: Normal        Anesthesia Plan  Type of Anesthesia, risks & benefits discussed:  Anesthesia Type:  spinal  Patient's Preference:   Intra-op Monitoring Plan:   Intra-op Monitoring Plan Comments:   Post Op Pain Control Plan:   Post Op Pain Control Plan Comments:   Induction:    Beta Blocker:  Patient is not currently on a Beta-Blocker (No further documentation required).       Informed Consent: Patient understands risks and agrees with Anesthesia plan.  Questions answered. Anesthesia consent signed with patient.  ASA Score: 2     Day of Surgery Review of History & Physical:    H&P update referred to the surgeon.         Ready For Surgery From Anesthesia Perspective.

## 2017-08-08 NOTE — ASSESSMENT & PLAN NOTE
- history of short cervix/hour glassing membranes in last pregnancy  - consents signed for cerclage and blood transfusion, risks/benefits/alternatives reviewed in detail  - FHTs verified 162 bpm  - will verify post-procedure as well  - will need 17 OHP weekly at 16 weeks  - to OR this morning for history-indicated cerclage

## 2017-08-08 NOTE — LETTER
August 8, 2017    Catina Myers  [address]  [place] [address]            [address]  [facility] [address]  Phone: [phone] August 8, 2017     Patient: Catian Myers   YOB: 1990   Date of Visit: 8/8/2017       To Whom It May Concern:    Ms. Catina Myers was under my care on 8/8/17. It is my medical opinion that Catina Myers should not return to work until 8/10/17.    If you have any questions or concerns, please don't hesitate to call.    Sincerely,        Juancarlos Heard MD

## 2017-08-09 NOTE — DISCHARGE SUMMARY
[facility]  Obstetrics  Discharge Summary      Patient Name: Catina Myers  MRN: [id number]  Admission Date: 2017  Hospital Length of Stay: 1 days  Discharge Date and Time:  2017 11:41 AM  Attending Physician: No att. providers found   Discharging Provider: Tamara Knox MD  Primary Care Provider: Kishore Gaspar MD    HPI: Catina Myers is a 27 y.o. [id number] at 13w0d presents as scheduled for history-indicated cerclage. Ms. Myers has a history of short cervix with hour-glassing membranes with last pregnancy, however she subsequently developed placental abruption and was delivered via classical .   This morning she is feeling well. She denies cramping, vaginal bleeding, leaking fluid, abnormal discharge.     Procedure(s) (LRB):  ENCERCLAGE (N/A)     Hospital Course:   2017 - Patient presented as scheduled for history indicated cerclage. The procedure was performed without complications. FHTs were verified with bedside US pre- and post-procedure (160 bpm, 153 bpm respectively). The patient was taken to recovery in stable condition. She complained of cramping post-operatively for which she got indocin and percocet with subsequent improvement. Speculum exam at that time demonstrated healthy pink cervix with no bleeding noted. She was discharged after she was able to tolearte PO and void and ambulate independently. She has follow-up scheduled with MFM and primary OB.        Final Active Diagnoses:    Diagnosis Date Noted POA    PRINCIPAL PROBLEM:  Incompetent cervix [N88.3] 2017 Yes    Waldron cerclage present, antepartum [O34.30] 2017 Unknown      Problems Resolved During this Admission:    Diagnosis Date Noted Date Resolved POA        Labs:   CBC   Recent Labs  Lab 17  0643   WBC 11.45   HGB 12.5   HCT 36.4*          Immunizations     None          This patient has no babies on file.  Pending Diagnostic Studies:     None          Discharged Condition:  good    Disposition: Home or Self Care    Follow Up:  Follow-up Information     [facility]. Go on 8/31/2017.    Specialty:  Maternal and Fetal Medicine  Why:  8/31 09:20am MFM appointment  Contact information:  [address]  [facility] [id number] [phone]               Patient Instructions:     Diet general     Activity as tolerated     Other restrictions (specify):   Order Comments: Pelvic rest until follow up with MFM     Call MD for:  temperature >100.4     Call MD for:  persistent nausea and vomiting or diarrhea     Call MD for:  severe uncontrolled pain     Call MD for:  difficulty breathing or increased cough     Call MD for:  severe persistent headache     Call MD for:  persistent dizziness, light-headedness, or visual disturbances     Call MD for:  increased confusion or weakness       Medications:  Discharge Medication List as of 8/8/2017  1:38 PM      CONTINUE these medications which have NOT CHANGED    Details   ferrous sulfate 325 mg (65 mg iron) Tab tablet Take 1 tablet (325 mg total) by mouth once daily., Starting Mon 6/26/2017, Until Tue 6/26/2018, Normal      hydrocortisone 1 % cream Apply to affected area 2 times daily, Print      ondansetron (ZOFRAN) 8 MG tablet Take 1 tablet (8 mg total) by mouth every 6 (six) hours as needed for Nausea., Starting Mon 7/10/2017, Normal      PNV with Ca,no.74-iron-FA 27-1 mg Tab Take 1 tablet by mouth once daily., Starting Thu 6/22/2017, Until Fri 6/22/2018, Normal      prenatal vitamin #49-iron-FA 6.75 mg iron- 200 mcg Tab Take 1 tablet by mouth once daily., Starting Fri 6/9/2017, Normal             Tamara Knox MD  Obstetrics  [facility] [place], operated by [facility]

## 2017-08-10 ENCOUNTER — TELEPHONE (OUTPATIENT)
Dept: MATERNAL FETAL MEDICINE | Facility: CLINIC | Age: 27
End: 2017-08-10

## 2017-08-10 ENCOUNTER — HOSPITAL ENCOUNTER (EMERGENCY)
Facility: OTHER | Age: 27
Discharge: HOME | End: 2017-08-10
Attending: OBSTETRICS & GYNECOLOGY
Payer: MEDICAID

## 2017-08-10 VITALS
DIASTOLIC BLOOD PRESSURE: 85 MMHG | TEMPERATURE: 97 F | HEART RATE: 91 BPM | SYSTOLIC BLOOD PRESSURE: 125 MMHG | OXYGEN SATURATION: 98 % | RESPIRATION RATE: 16 BRPM

## 2017-08-10 DIAGNOSIS — O34.31 CERVICAL CERCLAGE SUTURE PRESENT IN FIRST TRIMESTER: Primary | ICD-10-CM

## 2017-08-10 DIAGNOSIS — Z3A.13 13 WEEKS GESTATION OF PREGNANCY: ICD-10-CM

## 2017-08-10 PROCEDURE — 25000003 PHARM REV CODE 250: Performed by: STUDENT IN AN ORGANIZED HEALTH CARE EDUCATION/TRAINING PROGRAM

## 2017-08-10 PROCEDURE — 99284 EMERGENCY DEPT VISIT MOD MDM: CPT | Mod: ,,, | Performed by: OBSTETRICS & GYNECOLOGY

## 2017-08-10 PROCEDURE — 99284 EMERGENCY DEPT VISIT MOD MDM: CPT

## 2017-08-10 RX ORDER — ACETAMINOPHEN 500 MG
1000 TABLET ORAL ONCE
Status: COMPLETED | OUTPATIENT
Start: 2017-08-10 | End: 2017-08-10

## 2017-08-10 RX ADMIN — ACETAMINOPHEN 1000 MG: 500 TABLET ORAL at 07:08

## 2017-08-10 NOTE — ED PROVIDER NOTES
"Encounter Date: 8/10/2017       History     Chief Complaint   Patient presents with    Abdominal Pain     Catina Myers is a 27 y.o.  at 13w2d presents to the OB ED complaining of abdominal cramping after the placement of a history-indicated Waldron cerclage. The cerclage was placed given a history of short cervix with hour-glassing membranes with her last pregnancy. She delivered her previous pregnancy via  secondary to placental abruption. She states that she had persistent yet improving uterine contractions since the cerclage was placed on Tuesday.  She states that she has taken 2 tylenol since the procedure which seem to help.  That said, she has not taken anymore for fear of taking too many.  She also expresses a small amount of vaginal bleeding since the procedure but says she was counseled that this was likely expected.  She denies any leakage of fluid at this time and states that she does feel "flutters" of the baby from time to time.      This IUP is complicated by a history of a shorten cervix, history of placental abruption.                Review of patient's allergies indicates:   Allergen Reactions    Amoxil [amoxicillin]     Orange juice Hives    Toradol [ketorolac] Nausea And Vomiting     Past Medical History:   Diagnosis Date    Chlamydia     Flu     UTI (lower urinary tract infection)     Yeast infection      Past Surgical History:   Procedure Laterality Date     SECTION      HERNIA REPAIR       Family History   Problem Relation Age of Onset    No Known Problems Mother     No Known Problems Father     Diabetes Other     Hypertension Other      Social History   Substance Use Topics    Smoking status: Former Smoker    Smokeless tobacco: Never Used    Alcohol use Yes      Comment: occ     Review of Systems   Constitutional: Negative for chills and fever.   HENT: Negative for sore throat.    Eyes: Negative for visual disturbance.   Respiratory: Negative for " "shortness of breath.    Cardiovascular: Negative for chest pain.   Gastrointestinal: Positive for abdominal pain (contractions). Negative for abdominal distention and vomiting.   Genitourinary: Positive for vaginal bleeding (mild spotting). Negative for difficulty urinating, dysuria, hematuria, vaginal discharge and vaginal pain.   Musculoskeletal: Negative for back pain.   Skin: Negative for rash.   Neurological: Negative for weakness.   Hematological: Does not bruise/bleed easily.       Physical Exam     Initial Vitals   BP Pulse Resp Temp SpO2   -- -- -- -- --      MAP       --       Temp:  [97.3 °F (36.3 °C)] 97.3 °F (36.3 °C)  Pulse:  [91-98] 91  Resp:  [16] 16  SpO2:  [98 %] 98 %  BP: (125)/(85) 125/85    Physical Exam    Vitals reviewed.  Constitutional: She appears well-developed and well-nourished. She is not diaphoretic. No distress.   HENT:   Head: Normocephalic and atraumatic.   Eyes: Conjunctivae are normal.   Neck: Neck supple.   Cardiovascular: Normal rate.   Pulmonary/Chest: Breath sounds normal. No respiratory distress.   Abdominal: Soft. There is no tenderness. There is no rebound and no guarding.   Genitourinary: Vagina normal.   Neurological: She is alert and oriented to person, place, and time. She has normal reflexes.   Skin: Skin is warm and dry.   Psychiatric: She has a normal mood and affect. Her behavior is normal. Judgment and thought content normal.     OB LABOR EXAM:   Pre-Term Labor: No.   Membranes ruptured: No.   Method: Sterile vaginal exam per MD.   Vaginal Bleeding: none present.     Dilatation: 0.   Station: -3.         Comments: Cerclage noted on exam.  Appropriate tenderness with exam.  Knot palpated on exam at 12:00.  No "banjoing." Cervix closed.  Cerclage noted to be 1 cm from external cervical OS.       ED Course   Procedures  Labs Reviewed - No data to display          Medical Decision Making:   Initial Assessment:   27 y.o.  at 13w2d presents to the OB ED " complaining of abdominal cramping after the placement of a history-indicated Waldron cerclage.  ED Management:  FHT - 160s  SVE- Cerclage intact, well placed.              Attending Attestation:   Physician Attestation Statement for Resident:  As the supervising MD   Physician Attestation Statement: I have personally seen and examined this patient.   I agree with the above history. -:   As the supervising MD I agree with the above PE.    As the supervising MD I agree with the above treatment, course, plan, and disposition.   -: Patient evaluated and found to be stable, agree with resident's assessment of cramping post-cerclage with no evidence of  labor or chorioamnionitis and plan to discharge to home with  labor precautions.  I was personally present during the critical portions of the procedure(s) performed by the resident and was immediately available in the ED to provide services and assistance as needed during the entire procedure.  I have reviewed the following: old records at this facility.                    ED Course     Clinical Impression:   The primary encounter diagnosis was Cervical cerclage suture present in first trimester. A diagnosis of 13 weeks gestation of pregnancy was also pertinent to this visit.        - Cerclage well placed and intact.   - Symptoms appropriate for post-operative period  - Tylenol PRN for pain.  Provided reassurance that taking Tylenol was safe as long as usage was within guidelines.   - Follow up with OBGYN as scheduled for routine OB care.  - Post-Operative procedure precautions provided    Rian Platt M.D.  PGY1 OB/GYN                        Rina Valerio MD  Resident  08/10/17 2522       Nubia Dos Santos MD  08/10/17 [id number]

## 2017-08-10 NOTE — TELEPHONE ENCOUNTER
"----- Message from Bree Winslowdago sent at 8/10/2017  3:07 PM CDT -----  Contact: Self   Pt Catina Myers MRN [id number] is calling bc she is cramping and was sent home without speaking to Dr. Siddiqi and she is upset she doesn't have a doctors note, medicine, and wants to speak with the doctor. Pt can be reached at [phone].       Pt reports continued cramping since cerclage placement on 8/8/17. Pt states that she was given Percocet while in the hospital but was not sent home with any medications and was told to take tylenol if needed. Pt reports that "some" of the cramps cause her to have difficulty standing. Discussed with Dr. Siddiqi and pt instructed to come in for evaluation to L[facility]D [facility] 6th floor.    Pt verbalized understanding of information.    "

## 2017-08-10 NOTE — Clinical Note
Pt presents to the [facility] c/o lower pelvic cramping 8/10 painpain since the cerclage placement on 8/8/17

## 2017-08-16 ENCOUNTER — HOSPITAL ENCOUNTER (EMERGENCY)
Facility: HOSPITAL | Age: 27
Discharge: HOME | End: 2017-08-16
Attending: FAMILY MEDICINE
Payer: MEDICAID

## 2017-08-16 VITALS
WEIGHT: 236 LBS | RESPIRATION RATE: 18 BRPM | OXYGEN SATURATION: 99 % | DIASTOLIC BLOOD PRESSURE: 76 MMHG | HEIGHT: 66 IN | TEMPERATURE: 98 F | BODY MASS INDEX: 37.93 KG/M2 | SYSTOLIC BLOOD PRESSURE: 124 MMHG | HEART RATE: 72 BPM

## 2017-08-16 DIAGNOSIS — G44.209 ACUTE NON INTRACTABLE TENSION-TYPE HEADACHE: Primary | ICD-10-CM

## 2017-08-16 LAB
B-HCG UR QL: POSITIVE
BILIRUB SERPL-MCNC: NORMAL MG/DL
BLOOD URINE, POC: NORMAL
COLOR, POC UA: NORMAL
CTP QC/QA: YES
GLUCOSE UR QL STRIP: NORMAL
KETONES UR QL STRIP: NORMAL
LEUKOCYTE ESTERASE URINE, POC: NORMAL
NITRITE, POC UA: NORMAL
PH, POC UA: NORMAL
PROTEIN, POC: NORMAL
SPECIFIC GRAVITY, POC UA: NORMAL
UROBILINOGEN, POC UA: NORMAL

## 2017-08-16 PROCEDURE — 99283 EMERGENCY DEPT VISIT LOW MDM: CPT | Mod: 25

## 2017-08-16 PROCEDURE — 63600175 PHARM REV CODE 636 W HCPCS: Performed by: FAMILY MEDICINE

## 2017-08-16 PROCEDURE — 81025 URINE PREGNANCY TEST: CPT | Performed by: FAMILY MEDICINE

## 2017-08-16 PROCEDURE — 99284 EMERGENCY DEPT VISIT MOD MDM: CPT | Mod: ,,, | Performed by: FAMILY MEDICINE

## 2017-08-16 PROCEDURE — 96372 THER/PROPH/DIAG INJ SC/IM: CPT

## 2017-08-16 RX ORDER — MORPHINE SULFATE 10 MG/ML
8 INJECTION INTRAMUSCULAR; INTRAVENOUS; SUBCUTANEOUS ONCE
Status: DISCONTINUED | OUTPATIENT
Start: 2017-08-16 | End: 2017-08-16

## 2017-08-16 RX ORDER — MORPHINE SULFATE 2 MG/ML
8 INJECTION, SOLUTION INTRAMUSCULAR; INTRAVENOUS ONCE
Status: DISCONTINUED | OUTPATIENT
Start: 2017-08-16 | End: 2017-08-16

## 2017-08-16 RX ORDER — ONDANSETRON 2 MG/ML
4 INJECTION INTRAMUSCULAR; INTRAVENOUS
Status: COMPLETED | OUTPATIENT
Start: 2017-08-16 | End: 2017-08-16

## 2017-08-16 RX ORDER — BUTALBITAL, ACETAMINOPHEN AND CAFFEINE 50; 325; 40 MG/1; MG/1; MG/1
1-2 TABLET ORAL EVERY 6 HOURS PRN
Qty: 12 TABLET | Refills: 0 | Status: ON HOLD | OUTPATIENT
Start: 2017-08-16 | End: 2018-01-20 | Stop reason: HOSPADM

## 2017-08-16 RX ORDER — MORPHINE SULFATE 10 MG/ML
8 INJECTION, SOLUTION INTRAMUSCULAR; INTRAVENOUS ONCE
Status: DISCONTINUED | OUTPATIENT
Start: 2017-08-16 | End: 2017-08-16

## 2017-08-16 RX ORDER — ACETAMINOPHEN 500 MG
500 TABLET ORAL EVERY 6 HOURS PRN
Status: ON HOLD | COMMUNITY
End: 2018-01-20 | Stop reason: HOSPADM

## 2017-08-16 RX ORDER — MORPHINE SULFATE 10 MG/ML
8 INJECTION INTRAMUSCULAR; INTRAVENOUS; SUBCUTANEOUS ONCE
Status: COMPLETED | OUTPATIENT
Start: 2017-08-16 | End: 2017-08-16

## 2017-08-16 RX ORDER — MORPHINE SULFATE 2 MG/ML
8 INJECTION, SOLUTION INTRAMUSCULAR; INTRAVENOUS
Status: DISCONTINUED | OUTPATIENT
Start: 2017-08-16 | End: 2017-08-16

## 2017-08-16 RX ADMIN — ONDANSETRON 4 MG: 2 INJECTION INTRAMUSCULAR; INTRAVENOUS at 12:08

## 2017-08-16 RX ADMIN — MORPHINE SULFATE 8 MG: 10 INJECTION INTRAVENOUS at 12:08

## 2017-08-16 NOTE — ED TRIAGE NOTES
Patient reports that she is 3 months pregnant.  Has a frontal lobe head ache that has been going on for 4 days.  Patient reports that any type of light hurts her eyes.  She is A/Ox4 and in no acute distress.

## 2017-08-16 NOTE — DISCHARGE INSTRUCTIONS
Your history and exam does not indicate a dangerous cause for your headaches.    We will give you an injection for today's pain and a short-term prescription for headache control.    Continuous headaches during your pregnancy should be addressed and followed by your prenatal care providers and any additional specialists they feel are necessary.

## 2017-08-16 NOTE — ED PROVIDER NOTES
"Encounter Date: 2017    SCRIBE #1 NOTE: I, Iesha Choi, am scribing for, and in the presence of, Dr. Yao.       History     Chief Complaint   Patient presents with    Headache     light sensitivity/ 14 weeks pregnant     Time seen by provider: 11:26 AM    This is a 27 y.o. female who is pregnant presents with complaint of HA which she describes as "pressure in the middle of her head" and photophobia for 4 days. The patient is pregnant.  She denies similar episode in the past. She denies any fever, rhinorrhea, sore throat, or nose congestion but indicates 1 episode of vomiting 2 days ago.  The patient indicates that her pain does not improve with Tylenol PO. She denies any history of concussion or brain hemorrhage.      The history is provided by the patient.     Review of patient's allergies indicates:   Allergen Reactions    Amoxil [amoxicillin]     Orange juice Hives    Toradol [ketorolac] Nausea And Vomiting     Past Medical History:   Diagnosis Date    Chlamydia     Flu     UTI (lower urinary tract infection)     Yeast infection      Past Surgical History:   Procedure Laterality Date     SECTION      HERNIA REPAIR       Family History   Problem Relation Age of Onset    No Known Problems Mother     No Known Problems Father     Diabetes Other     Hypertension Other      Social History   Substance Use Topics    Smoking status: Former Smoker    Smokeless tobacco: Never Used    Alcohol use Yes      Comment: occ     Review of Systems   Constitutional: Negative for fever.   HENT: Negative for congestion, rhinorrhea and sore throat.    Eyes: Positive for photophobia.   Gastrointestinal: Positive for vomiting.   Neurological: Positive for headaches.       Physical Exam     Initial Vitals [17 0954]   BP Pulse Resp Temp SpO2   128/81 80 16 98.8 °F (37.1 °C) 98 %      MAP       96.67         Physical Exam    Nursing note and vitals reviewed.  Constitutional: No distress.   HENT:   Head: " Normocephalic and atraumatic.   Negative for erythema or post nasal drip in the throat.   Eyes:   Fundi benign. Negative for papilledema.   Neck: Normal range of motion. Neck supple.   Negative for paraspinous spasm.   Cardiovascular: Normal rate, regular rhythm and normal heart sounds. Exam reveals no gallop and no friction rub.    No murmur heard.  Pulmonary/Chest: Breath sounds normal. No respiratory distress. She has no wheezes. She has no rhonchi. She has no rales.   Abdominal: Soft. She exhibits no distension. There is no tenderness. There is no rebound and no guarding.   Musculoskeletal:   Full ROM in extremities x4.  Peripheral vascular at baseline.   Neurological: She is alert and oriented to person, place, and time. No cranial nerve deficit or sensory deficit.   Cerebellar and cranial nerves intact.         ED Course   Procedures  Labs Reviewed   POCT URINE PREGNANCY - Abnormal; Notable for the following:        Result Value    POC Preg Test, Ur Positive (*)     All other components within normal limits   POCT URINE PREGNANCY             Medical Decision Making:   History:   Old Medical Records: I decided to obtain old medical records.  Clinical Tests:   Lab Tests: Ordered and Reviewed  ED Management:  Patient Has a stable neurological exam initially and @ discharge.   Her headache does not have features suggestive of CVA, intracerebral bleed, intracerebral infection, or malignancy.  Patient is responded to symptom control and is stable for discharge.  Precautions for followup with prenatal care have been discussed in detail              Scribe Attestation:   Scribe #1: I performed the above scribed service and the documentation accurately describes the services I performed. I attest to the accuracy of the note.    Attending Attestation:           Physician Attestation for Scribe:  Physician Attestation Statement for Scribe #1: I, Dr. Yao, reviewed documentation, as scribed by Iesha Choi in my presence,  and it is both accurate and complete.                 ED Course     Clinical Impression:   The encounter diagnosis was Acute non intractable tension-type headache.    Disposition:   Disposition: Discharged  Condition: Stable                        Mark Yao MD  08/18/17 1005

## 2017-08-17 ENCOUNTER — HOSPITAL ENCOUNTER (EMERGENCY)
Facility: HOSPITAL | Age: 27
Discharge: HOME | End: 2017-08-17
Attending: EMERGENCY MEDICINE
Payer: MEDICAID

## 2017-08-17 VITALS
BODY MASS INDEX: 40.29 KG/M2 | HEART RATE: 86 BPM | HEIGHT: 64 IN | WEIGHT: 236 LBS | OXYGEN SATURATION: 98 % | DIASTOLIC BLOOD PRESSURE: 84 MMHG | RESPIRATION RATE: 18 BRPM | TEMPERATURE: 98 F | SYSTOLIC BLOOD PRESSURE: 129 MMHG

## 2017-08-17 DIAGNOSIS — O26.90 PREGNANCY, COMPLICATIONS OF: Primary | ICD-10-CM

## 2017-08-17 DIAGNOSIS — O36.8120 DECREASED FETAL MOVEMENT, SECOND TRIMESTER, NOT APPLICABLE OR UNSPECIFIED FETUS: Primary | ICD-10-CM

## 2017-08-17 PROCEDURE — 99283 EMERGENCY DEPT VISIT LOW MDM: CPT

## 2017-08-17 NOTE — ED PROVIDER NOTES
Encounter Date: 2017       History     Chief Complaint   Patient presents with    Decreased Fetal Movement     States she was seen by a doctor yesterday and was given morphine for a HA while 14 weeks pregnant and states she doens't feel the fetus moving     Chief complaint: Decreased fetal movement    History of present illness: Patient is a 27-year-old female who presents for emergent consideration of decreased fetal movement.  She states that she is 14 weeks pregnant and was treated yesterday for a headache and given a morphine injection and since then she has felt decreased fetal movement.  Nothing she does connect the fetal movement return.    HPI  Review of patient's allergies indicates:   Allergen Reactions    Amoxil [amoxicillin]     Orange juice Hives    Toradol [ketorolac] Nausea And Vomiting     Past Medical History:   Diagnosis Date    Chlamydia     Flu     UTI (lower urinary tract infection)     Yeast infection      Past Surgical History:   Procedure Laterality Date     SECTION      HERNIA REPAIR       Family History   Problem Relation Age of Onset    No Known Problems Mother     No Known Problems Father     Diabetes Other     Hypertension Other      Social History   Substance Use Topics    Smoking status: Former Smoker    Smokeless tobacco: Never Used    Alcohol use Yes      Comment: occ     Review of Systems   Constitutional: Negative for chills, fatigue and fever.   HENT: Negative for congestion, ear discharge, ear pain, postnasal drip, rhinorrhea, sinus pressure, sneezing, sore throat and voice change.    Eyes: Negative for discharge and itching.   Respiratory: Negative for cough, shortness of breath and wheezing.    Cardiovascular: Negative for chest pain, palpitations and leg swelling.   Gastrointestinal: Negative for abdominal pain, constipation, diarrhea, nausea and vomiting.   Endocrine: Negative for polydipsia, polyphagia and polyuria.   Genitourinary: Negative for  dysuria, frequency, hematuria, urgency, vaginal bleeding, vaginal discharge and vaginal pain.        Decreased fetal movement   Musculoskeletal: Negative for arthralgias and myalgias.   Skin: Negative for rash and wound.   Neurological: Negative for dizziness, seizures, syncope, weakness and numbness.   Hematological: Negative for adenopathy. Does not bruise/bleed easily.   Psychiatric/Behavioral: Negative for self-injury and suicidal ideas. The patient is not nervous/anxious.        Physical Exam     Initial Vitals [08/17/17 1113]   BP Pulse Resp Temp SpO2   129/84 86 18 98.4 °F (36.9 °C) 98 %      MAP       99         Physical Exam    Nursing note and vitals reviewed.  Constitutional: She appears well-developed and well-nourished.   HENT:   Head: Normocephalic and atraumatic.   Right Ear: External ear normal.   Left Ear: External ear normal.   Nose: Nose normal.   Eyes: Conjunctivae and EOM are normal. Pupils are equal, round, and reactive to light. Right eye exhibits no discharge. Left eye exhibits no discharge.   Neck: Normal range of motion.   Abdominal: She exhibits no distension.   Genitourinary:   Genitourinary Comments: [id number] over the medial pubis   Musculoskeletal: Normal range of motion.   Neurological: She is alert and oriented to person, place, and time.   Skin: Skin is dry. Capillary refill takes less than 2 seconds.         ED Course   Procedures  Labs Reviewed - No data to display          Medical Decision Making:   Initial Assessment:   27 y.o. female presents for emergent evaluation of decreased fetal movement  ED Management:  Following a thorough history and physical, the patient was checked for fetal heart tones.  I was able to auscultate fetal heart tones using the Doppler over the median pubis at 166 bpm.  This effectively ruled out fetal demise.  The patient was directed to follow up with her OB/GYN and return to the emergency department for any further issues.  She had no vaginal  bleeding or discharge no abdominal cramping or pain.  Care of the patient discussed with Dr. Blackwell who agreed with the assessment and plan.                     ED Course     Clinical Impression:   The encounter diagnosis was Decreased fetal movement, second trimester, not applicable or unspecified fetus.    Disposition:   Disposition: Discharged  Condition: Stable                        Bharat Barba, [facility]  08/17/17 1921

## 2017-08-17 NOTE — ED TRIAGE NOTES
3 Months pregnant decreased fetal movement since yesterday [facility] [address] denies falls or trauma

## 2017-08-31 ENCOUNTER — HOSPITAL ENCOUNTER (OUTPATIENT)
Dept: PERINATAL CARE | Facility: HOSPITAL | Age: 27
Discharge: HOME | End: 2017-08-31
Attending: OBSTETRICS & GYNECOLOGY
Payer: MEDICAID

## 2017-08-31 DIAGNOSIS — Z98.890 HISTORY OF CERVICAL CERCLAGE: ICD-10-CM

## 2017-08-31 DIAGNOSIS — O09.892 HISTORY OF PRETERM DELIVERY, CURRENTLY PREGNANT IN SECOND TRIMESTER: ICD-10-CM

## 2017-08-31 DIAGNOSIS — O10.912 CHRONIC HYPERTENSION IN OBSTETRIC CONTEXT IN SECOND TRIMESTER: ICD-10-CM

## 2017-08-31 PROCEDURE — 76817 TRANSVAGINAL US OBSTETRIC: CPT

## 2017-08-31 PROCEDURE — 76817 TRANSVAGINAL US OBSTETRIC: CPT | Mod: 26,,, | Performed by: OBSTETRICS & GYNECOLOGY

## 2017-08-31 PROCEDURE — 76816 OB US FOLLOW-UP PER FETUS: CPT

## 2017-08-31 PROCEDURE — 99212 OFFICE O/P EST SF 10 MIN: CPT | Mod: 25,TH,, | Performed by: OBSTETRICS & GYNECOLOGY

## 2017-09-02 NOTE — ADDENDUM NOTE
Encounter addended by: Sabrina Orozco MD on: 9/2/2017  9:08 AM<BR>    Actions taken: Sign clinical note

## 2017-09-02 NOTE — PROGRESS NOTES
"Indication  ========    F/U Cervix/Cerclage/growth.    Method  ======    Transabdominal and transvaginal ultrasound examination. View: Sufficient.    Pregnancy  =========    Hearn pregnancy. Number of fetuses: 1.    Dating  ======    Cycle: regular cycle  GA by "stated dating" 16 w + 2 d  VICKI by "stated dating": 2/13/2018  Ultrasound examination on: 8/31/2017  GA by U/S based upon: AC, BPD, Femur, HC  GA by U/S 15 w + 6 d  VICKI by U/S: 2/16/2018  Assigned: Dating performed on 08/3/2017, based on the external assessment  Assigned GA 16 w + 2 d  Assigned VICKI: 2/13/2018    General Evaluation  ==============    Cardiac activity: present.  bpm.  Fetal movements: visualized.  Presentation: cephalic.  Placenta: Fundal.  Umbilical cord: 3 vessel cord.  Amniotic fluid: normal amount.    Fetal Biometry  ============    Fetal Biometry  BPD 32.4 mm 16w 1d Hadlock  OFD 39.7 mm 15w 6d Mamie  .3 mm 15w 5d Hadlock  AC 94.6 mm 15w 4d Hadlock  Femur 20.3 mm 16w 0d Hadlock   g  Calculated by: Hadlock (BPD-HC-AC-FL)  EFW (lb) 0 lb  EFW (oz) 5 oz  Cephalic index 0.82  HC / AC 1.22  FL / BPD 0.63  FL / AC 0.21   bpm    Fetal Anatomy  ============    Stomach: normal  Kidneys: normal  Bladder: normal  Wants to know gender: yes    Maternal Structures  ===============    Uterus / Cervix  Cerclage: Cervical cerclage present  Approach: w/o pressure  Second approach: w/o pressure  Funnelling cerclage approach: Transvaginal  Cervical length with fundal pressure 46.0 mm  Cervix pre-stitch with fundal pressure 5.3 mm  Cervix pre-stitch without fundal pressure 7.1 mm  Cervix post-stitch with fundal pressure 45.5 mm  Cervix post-stitch without fundal pressure 38.9 mm    Impression  =========    Hearn live intrauterine pregnancy.  Biometry agrees with the clinical dating.  Amniotic fluid volume is normal by qualitative assessment.  Placenta is posterior without previa.  Transvaginal cervical length is normal. " Cerclage visualized.    Cr 0.49, Normal AST/ALT  /76  24 hour urine protein 119mg (posted after [facility] visit)    The patient denies any  labor complaints. She denies vaginal bleeding, leakage of fluid, contractions or cramping. She reports she has  not gained a lot of weight this pregnancy. We reviewed weight gain limitations in pregnancy.  The patient is receiving 17 OHPC injections.  Ultrasound findings were reviewed.  Pelvic rest precautions were again given.  The patient's sequential results indicate an NT was not included. She does have an NT recorded. I would recommend that your office confirm  why the NT was not included in the calculation.  Dr. Enriquez was messaged.    10 minutes was spent in face to face time with greater than half of that time spent in counseling and coordination of care.    Recommendation  ==============    Follow up ultrasound in 3 weeks for fetal anatomy. Cervix can be viewed transabdominally unless there are any concerns.

## 2017-09-21 ENCOUNTER — HOSPITAL ENCOUNTER (OUTPATIENT)
Dept: PERINATAL CARE | Facility: HOSPITAL | Age: 27
Discharge: HOME | End: 2017-09-21
Attending: OBSTETRICS & GYNECOLOGY
Payer: MEDICAID

## 2017-09-21 DIAGNOSIS — O26.90 PREGNANCY, COMPLICATIONS OF: ICD-10-CM

## 2017-09-21 PROCEDURE — 76817 TRANSVAGINAL US OBSTETRIC: CPT | Mod: 26,,, | Performed by: OBSTETRICS & GYNECOLOGY

## 2017-09-21 PROCEDURE — 76811 OB US DETAILED SNGL FETUS: CPT

## 2017-09-21 PROCEDURE — 76811 OB US DETAILED SNGL FETUS: CPT | Mod: 26,,, | Performed by: OBSTETRICS & GYNECOLOGY

## 2017-09-21 PROCEDURE — 76817 TRANSVAGINAL US OBSTETRIC: CPT

## 2017-09-25 NOTE — PROGRESS NOTES
"Indication  ========    Fetal anatomy survey (Dr. Enriquez).    History  ======    General History  Other: cerclage in place  prior classical C/S  Prior PTD at 24 wks: short cervix and then placental abruption    Maternal Assessment  =================    Weight 108 kg  Weight (lb) 239 lb  BP syst 117 mmHg  BP diast 68 mmHg    Method  ======    Transabdominal ultrasound examination. View: Sufficient.    Pregnancy  =========    Hearn pregnancy. Number of fetuses: 1.    Dating  ======    Cycle: regular cycle  GA by "stated dating" 19 w + 2 d  VICKI by "stated dating": 2/13/2018  Ultrasound examination on: 9/21/2017  GA by U/S based upon: AC, BPD, Femur, HC  GA by U/S 19 w + 2 d  VICKI by U/S: 2/13/2018  Assigned: Dating performed on 08/3/2017, based on the external assessment  Assigned GA 19 w + 2 d  Assigned VICKI: 2/13/2018    General Evaluation  ==============    Cardiac activity: present.  bpm.  Fetal movements: visualized.  Presentation: cephalic.  Placenta: posterior, fundal.  Umbilical cord: 3 vessel cord, normal.  Amniotic fluid: normal amount.    Fetal Biometry  ============    Fetal Biometry  BPD 42.9 mm 19w 0d Hadlock  OFD 59.0 mm 20w 4d Mamie  .5 mm 19w 1d Hadlock  .7 mm 19w 1d Hadlock  Femur 31.6 mm 19w 6d Hadlock  Cerebellum tr 19.9 mm 19w 6d Gibbs  CM 3.6 mm  Nuchal fold 3.07 mm  Humerus 28.2 mm 19w 1d Mamie   g  Calculated by: Hadlock (BPD-HC-AC-FL)  EFW (lb) 0 lb  EFW (oz) 10 oz  Cephalic index 0.73  HC / AC 1.19  FL / BPD 0.74  FL / AC 0.23   bpm  Head / Face / Neck   4.4 mm  Nasal bone 4.9 mm    Fetal Anatomy  ============    Cranium: normal  Lateral ventricles: normal  Choroid plexus: normal  Midline falx: normal  Cavum septi pellucidi: normal  Cerebellum: normal  Cisterna magna: normal  Head shape: normal  Rt lateral ventricle: normal  Lt lateral ventricle: normal  Rt choroid plexus: normal  Lt choroid plexus: normal  Parenchyma: normal  Third " ventricle: normal  Posterior fossa: normal  Cerebellar lobes: normal  Vermis: normal  Neck: appears normal  Nuchal fold: normal  Lips: normal  Profile: normal  Nose: normal  Maxilla: normal  Mandible: normal  4-chamber view: normal  RVOT: normal  LVOT: normal  Heart / Thorax: Septal views: suboptimum  Situs: normal  Aortic arch: normal  Ductal arch: normal  SVC: normal  IVC: normal  3-vessel view: normal  3-vessel-trachea view: suboptimal  Cardiac position: normal  Cardiac axis: normal  Cardiac size: normal  Cardiac rhythm: normal  Rt lung: normal  Lt lung: normal  Diaphragm: normal  Cord insertion: suboptimal  Stomach: normal  Kidneys: normal  Bladder: normal  Abdom. wall: appears normal  Abdom. cavity: normal  Rt kidney: normal  Lt kidney: normal  Liver: normal  Bowel: normal  Cervical spine: normal  Thoracic spine: normal  Lumbar spine: normal  Sacral spine: normal  Spine / Skelet.: suboptimum transverse views  Arms: normal  Legs: normal  Rt arm: normal  Lt arm: normal  Rt upper arm: normal  Rt forearm: normal  Rt hand: present/closed  Lt upper arm: normal  Lt forearm: normal  Lt hand: present/closed  Rt leg: normal  Lt leg: normal  Rt upper leg: normal  Rt lower leg: normal  Rt foot: normal  Lt upper leg: normal  Lt lower leg: normal  Lt foot: normal  Position of hands: normal  Position of feet: normal  Wants to know gender: yes    Maternal Structures  ===============    Uterus / Cervix  Uterus: Normal  Cerclage: Cervical cerclage present  Approach: w/o pressure  Second approach: w/o pressure  Funnelling cerclage approach: Transvaginal  Cervical length with fundal pressure 51.9 mm  Other: Uterus and adnexa normal    Impression  =========    1. 19w 2d pizarro intrauterine pregnancy  2. Fetal biometry c/w dates  3. No fetal structural abnormalities appreciated but incomplete fetal anatomical survey  4. Amniotic fluid volume wnl per qualitative assessment  5. Placenta posterior/fundal  6. Cervical assessment via  TVU (cerclage in place):  -length 5.2cm  -cerclage visualized    Patient counseled on sono evaluation and recommendations  .    Recommendation  ==============    1. We will schedule patient for a f/u sono in about 5 wks with MFM for f/u anatomy, interval growth, and amniotic fluid volume    2. Please refer to Dr. Orozco previous note on 08- for previous evaluation/recs  -previous (1) mildly elevated blood pressure: observe closely BP's    -If dx'd with CHTN, recommend the following:  -serial sonos every 4-6 wks for interval fetal growth  -fetal surveillance (ex. NST twice a week, weekly MFP starting  at 32 wks gestation  -ASA 81 mg po daily for prevention of preeclampsia    3. Continue with weekly 17-OHP injections    4. Maternal sequential screen pending    further recommendations can be provided with advancing EGA and per clinical status  .

## 2017-12-27 ENCOUNTER — HOSPITAL ENCOUNTER (OUTPATIENT)
Facility: HOSPITAL | Age: 27
Discharge: HOME | End: 2017-12-27
Attending: OBSTETRICS & GYNECOLOGY | Admitting: OBSTETRICS & GYNECOLOGY
Payer: MEDICAID

## 2017-12-27 VITALS
RESPIRATION RATE: 20 BRPM | WEIGHT: 257 LBS | DIASTOLIC BLOOD PRESSURE: 55 MMHG | HEART RATE: 83 BPM | BODY MASS INDEX: 43.87 KG/M2 | TEMPERATURE: 99 F | HEIGHT: 64 IN | SYSTOLIC BLOOD PRESSURE: 109 MMHG

## 2017-12-27 DIAGNOSIS — Z34.82 PRENATAL CARE, SUBSEQUENT PREGNANCY, SECOND TRIMESTER: ICD-10-CM

## 2017-12-27 DIAGNOSIS — N88.3 INCOMPETENT CERVIX: ICD-10-CM

## 2017-12-27 PROCEDURE — G0378 HOSPITAL OBSERVATION PER HR: HCPCS

## 2017-12-27 NOTE — TREATMENT PLAN
Pt arrived to unit from office per Dr. Enriquez. EFMx2 placed, positive FHT noted. No complaints at this time.  POC reviewed with pt, verbalizes understanding.

## 2017-12-27 NOTE — DISCHARGE INSTRUCTIONS
Home Undelivered Discharge Instructions    After Discharge Orders:    Future Appointments  Date Time Provider Department Center   12/29/2017 8:45 AM NST SCHEDULE-[facility]   12/29/2017 10:30 AM Placido Steen MD [facility]   1/3/2018 9:30 AM INJECTION SCHEDULE-[facility]       Follow up in office on 12/29/17 for NST and prenatal appointment with Dr. Steen @ 8:45 am  Call MD office for any questions or concerns, [phone]    Current Discharge Medication List      CONTINUE these medications which have NOT CHANGED    Details   PNV with Ca,no.74-iron-FA 27-1 mg Tab Take 1 tablet by mouth once daily.  Qty: 30 tablet, Refills: 11      acetaminophen (TYLENOL) 500 MG tablet Take 500 mg by mouth every 6 (six) hours as needed for Pain.      butalbital-acetaminophen-caffeine -40 mg (FIORICET, ESGIC) -40 mg per tablet Take 1-2 tablets by mouth every 6 (six) hours as needed for Headaches. For short term use only.  You may NOT take this medicine during the last 12 weeks (3 months) of your pregnancy.  Qty: 12 tablet, Refills: 0      ferrous sulfate 325 mg (65 mg iron) Tab tablet Take 1 tablet (325 mg total) by mouth once daily.  Qty: 30 tablet, Refills: 3    Associated Diagnoses: Prenatal care, subsequent pregnancy, first trimester      hydrocortisone 1 % cream Apply to affected area 2 times daily  Qty: 30 g, Refills: 0      ondansetron (ZOFRAN) 8 MG tablet Take 1 tablet (8 mg total) by mouth every 6 (six) hours as needed for Nausea.  Qty: 30 tablet, Refills: 0    Associated Diagnoses: Nausea and vomiting in pregnancy      prenatal vitamin #49-iron-FA 6.75 mg iron- 200 mcg Tab Take 1 tablet by mouth once daily.  Qty: 90 tablet, Refills: 2    Associated Diagnoses: Pregnancy, unspecified gestational age                     · Diet:  normal diet as tolerated    · Rest: normal activity as tolerated    Other instructions: Do kick counts once a day on your baby. Choose the time of day your baby is most  active. Get in a comfortable lying or sitting position and time how long it takes to feel 10 kicks, twists, turns, swishes, or rolls. Call your physician or midwife if there have not been 10 kicks in 2 hours    Call physician or midwife, return to Labor and Delivery, call 911, or go to the nearest Emergency Room if: increased leakage or fluid, contractions more than  3 per  1 hour, decreased fetal movement, persistent low back pain or cramping, bleeding from vaginal area, difficulty urinating, pain with urination, difficulty breathing, new calf pain, persistent headache or vision change

## 2017-12-27 NOTE — NURSING
Notified Dr. Castro of reactive nst. MD gave order for d/c, pt to follow up in office for next scheduled prenatal appt.

## 2017-12-27 NOTE — NURSING
Pt d/c'd home. D/c instructions given to pt, pt verb understanding. Pt ambulated off unit with steady gait, NAD.

## 2018-01-08 ENCOUNTER — HOSPITAL ENCOUNTER (OUTPATIENT)
Facility: HOSPITAL | Age: 28
Discharge: HOME | End: 2018-01-08
Attending: OBSTETRICS & GYNECOLOGY | Admitting: OBSTETRICS & GYNECOLOGY
Payer: MEDICAID

## 2018-01-08 VITALS
DIASTOLIC BLOOD PRESSURE: 68 MMHG | TEMPERATURE: 98 F | HEIGHT: 64 IN | RESPIRATION RATE: 18 BRPM | BODY MASS INDEX: 43.36 KG/M2 | WEIGHT: 254 LBS | HEART RATE: 80 BPM | OXYGEN SATURATION: 100 % | SYSTOLIC BLOOD PRESSURE: 116 MMHG

## 2018-01-08 DIAGNOSIS — Z34.93 PRENATAL CARE IN THIRD TRIMESTER: ICD-10-CM

## 2018-01-08 DIAGNOSIS — Z34.82 PRENATAL CARE, SUBSEQUENT PREGNANCY, SECOND TRIMESTER: ICD-10-CM

## 2018-01-08 LAB
FLUAV AG SPEC QL IA: NEGATIVE
FLUBV AG SPEC QL IA: NEGATIVE
SPECIMEN SOURCE: NORMAL

## 2018-01-08 PROCEDURE — 25000003 PHARM REV CODE 250: Performed by: OBSTETRICS & GYNECOLOGY

## 2018-01-08 PROCEDURE — 87400 INFLUENZA A/B EACH AG IA: CPT | Mod: 59

## 2018-01-08 RX ORDER — ONDANSETRON 8 MG/1
8 TABLET, ORALLY DISINTEGRATING ORAL EVERY 8 HOURS PRN
Status: DISCONTINUED | OUTPATIENT
Start: 2018-01-08 | End: 2018-01-08 | Stop reason: HOSPADM

## 2018-01-08 RX ORDER — SODIUM CHLORIDE 9 MG/ML
INJECTION, SOLUTION INTRAVENOUS
Status: DISCONTINUED | OUTPATIENT
Start: 2018-01-08 | End: 2018-01-08 | Stop reason: HOSPADM

## 2018-01-08 RX ORDER — ONDANSETRON 8 MG/1
8 TABLET, ORALLY DISINTEGRATING ORAL EVERY 8 HOURS PRN
Status: DISCONTINUED | OUTPATIENT
Start: 2018-01-08 | End: 2018-01-08 | Stop reason: SDUPTHER

## 2018-01-08 RX ORDER — DEXTROSE, SODIUM CHLORIDE, SODIUM LACTATE, POTASSIUM CHLORIDE, AND CALCIUM CHLORIDE 5; .6; .31; .03; .02 G/100ML; G/100ML; G/100ML; G/100ML; G/100ML
INJECTION, SOLUTION INTRAVENOUS CONTINUOUS
Status: DISCONTINUED | OUTPATIENT
Start: 2018-01-08 | End: 2018-01-08 | Stop reason: HOSPADM

## 2018-01-08 RX ADMIN — DEXTROSE, SODIUM CHLORIDE, SODIUM LACTATE, POTASSIUM CHLORIDE, AND CALCIUM CHLORIDE: 5; .6; .31; .03; .02 INJECTION, SOLUTION INTRAVENOUS at 02:01

## 2018-01-08 RX ADMIN — DEXTROSE, SODIUM CHLORIDE, SODIUM LACTATE, POTASSIUM CHLORIDE, AND CALCIUM CHLORIDE: 5; .6; .31; .03; .02 INJECTION, SOLUTION INTRAVENOUS at 01:01

## 2018-01-08 NOTE — DISCHARGE INSTRUCTIONS
Home Undelivered Discharge Instructions    After Discharge Orders:    Future Appointments  Date Time Provider Department Center   1/10/2018 9:15 AM INJECTION SCHEDULE-[facility]   1/11/2018 1:35 PM NST SCHEDULE-[facility]   1/11/2018 2:45 PM Bri Enriquez MD [facility]       Call physician's office for any questions or concerns    Current Discharge Medication List      CONTINUE these medications which have NOT CHANGED    Details   acetaminophen (TYLENOL) 500 MG tablet Take 500 mg by mouth every 6 (six) hours as needed for Pain.      butalbital-acetaminophen-caffeine -40 mg (FIORICET, ESGIC) -40 mg per tablet Take 1-2 tablets by mouth every 6 (six) hours as needed for Headaches. For short term use only.  You may NOT take this medicine during the last 12 weeks (3 months) of your pregnancy.  Qty: 12 tablet, Refills: 0      ferrous sulfate 325 mg (65 mg iron) Tab tablet Take 1 tablet (325 mg total) by mouth once daily.  Qty: 30 tablet, Refills: 3    Associated Diagnoses: Prenatal care, subsequent pregnancy, first trimester      hydrocortisone 1 % cream Apply to affected area 2 times daily  Qty: 30 g, Refills: 0      ondansetron (ZOFRAN) 8 MG tablet Take 1 tablet (8 mg total) by mouth every 6 (six) hours as needed for Nausea.  Qty: 30 tablet, Refills: 0    Associated Diagnoses: Nausea and vomiting in pregnancy      PNV with Ca,no.74-iron-FA 27-1 mg Tab Take 1 tablet by mouth once daily.  Qty: 30 tablet, Refills: 11      prenatal vitamin #49-iron-FA 6.75 mg iron- 200 mcg Tab Take 1 tablet by mouth once daily.  Qty: 90 tablet, Refills: 2    Associated Diagnoses: Pregnancy, unspecified gestational age                     · Diet:  normal diet as tolerated    · Rest: normal activity as tolerated    Other instructions: Do kick counts once a day on your baby. Choose the time of day your baby is most active. Get in a comfortable lying or sitting position and time how long it takes to feel 10 kicks,  twists, turns, swishes, or rolls. Call your physician or midwife if there have not been 10 kicks in 1 hours    Call physician or midwife, return to Labor and Delivery, call 911, or go to the nearest Emergency Room if: increased leakage or fluid, contractions more than  10 per  1 hour, decreased fetal movement, persistent low back pain or cramping, bleeding from vaginal area, difficulty urinating, pain with urination, difficulty breathing, new calf pain, persistent headache or vision change

## 2018-01-08 NOTE — TREATMENT PLAN
Dr Howell made aware that both influenza a and b were negative. fhts 140s and reactive. Pt has had 2 bags of iv fluids. Orders given to dc home and to follow up in office as scheduled. Verbal recall.

## 2018-01-08 NOTE — H&P (VIEW-ONLY)
History and Physical - Obstetrics    Subjective:     Patient is a 27 y.o.  @ 34w6d gestational age with an Estimated Date of Delivery: 18, who presents with the complaint of: diarrhea, sweats. Fetal Movement: normal.    Her current obstetrical history is significant for h/o classical with Waldron cerclage and getting 17-OHP, CHTN.    Review of Systems  Nine system ROS negative except for HPI      (Not in a hospital admission)    Review of patient's allergies indicates:   Allergen Reactions    Amoxil [amoxicillin]     Orange juice Hives    Toradol [ketorolac] Nausea And Vomiting        Past Medical History:   Diagnosis Date    Chlamydia     Chronic hypertension in obstetric context in second trimester     Flu     High-risk pregnancy     UTI (lower urinary tract infection)     Yeast infection        Past Surgical History:   Procedure Laterality Date     SECTION      HERNIA REPAIR         OB History      Para Term  AB Living    2 1 0 1 0 1    SAB TAB Ectopic Multiple Live Births    0 0 0 0 1          Social History     Social History    Marital status: Single     Spouse name: N/A    Number of children: N/A    Years of education: N/A     Occupational History    Not on file.     Social History Main Topics    Smoking status: Former Smoker    Smokeless tobacco: Never Used    Alcohol use No    Drug use: No    Sexual activity: Yes     Partners: Male     Birth control/ protection: None     Other Topics Concern    Not on file     Social History Narrative    No narrative on file       Family History   Problem Relation Age of Onset    No Known Problems Mother     No Known Problems Father     Diabetes Other     Hypertension Other        Objective:   Vital Signs (Most Recent)  BP: 132/75 (18 1031)  Fetal Heart Tones: 160's    General: no acute distress, alert and oriented to person, place and time  Abdomen: gravid, no palpable contractions  Incision(s): midline  vertical  Extremities: calves non-tender to palpation, no calf edema, erythema or palpable cords  Cervix: deferred    Laboratory: see results console    Assessment:     28 yo  @ 34w6d gestational age with diarrhea, sweats and fetal tachycardia    Plan:     To L&D for IVFs and monitoring  Flu swab

## 2018-01-08 NOTE — INTERVAL H&P NOTE
The patient has been examined and the H&P has been reviewed:    I concur with the findings and no changes have occurred since H&P was written.    Will give some IVF.  Will treat based on flu swabs, but likely to discharge later to home.      Raheem Howell          There are no hospital problems to display for this patient.

## 2018-01-08 NOTE — TREATMENT PLAN
Pt arrived to unit with orders from office for flu swab and IV fluids. Pt has a mask on and states she has been having a runny nose, diarrhea, and fever since Thursday. Pt in good spirits. Monitors applied x2 Assessment completed.IV and flu swab completed. Call bell in reach. Will cont to monitor.

## 2018-01-08 NOTE — TREATMENT PLAN
Pt discharged home per md Howell's orders. Follow up instructions given and explained. Education given on kickcounts labor precautions. Ambulated off unit with nad noted. Significant other at side.

## 2018-01-14 ENCOUNTER — HOSPITAL ENCOUNTER (OUTPATIENT)
Facility: HOSPITAL | Age: 28
Discharge: HOME | End: 2018-01-14
Attending: OBSTETRICS & GYNECOLOGY | Admitting: OBSTETRICS & GYNECOLOGY
Payer: COMMERCIAL

## 2018-01-14 VITALS
RESPIRATION RATE: 18 BRPM | TEMPERATURE: 98 F | BODY MASS INDEX: 43.02 KG/M2 | SYSTOLIC BLOOD PRESSURE: 118 MMHG | DIASTOLIC BLOOD PRESSURE: 57 MMHG | HEART RATE: 85 BPM | HEIGHT: 64 IN | WEIGHT: 252 LBS

## 2018-01-14 PROCEDURE — 63600175 PHARM REV CODE 636 W HCPCS: Performed by: OBSTETRICS & GYNECOLOGY

## 2018-01-14 PROCEDURE — 59025 FETAL NON-STRESS TEST: CPT

## 2018-01-14 PROCEDURE — 96372 THER/PROPH/DIAG INJ SC/IM: CPT

## 2018-01-14 RX ORDER — BETAMETHASONE SODIUM PHOSPHATE AND BETAMETHASONE ACETATE 3; 3 MG/ML; MG/ML
12 INJECTION, SUSPENSION INTRA-ARTICULAR; INTRALESIONAL; INTRAMUSCULAR; SOFT TISSUE ONCE
Status: COMPLETED | OUTPATIENT
Start: 2018-01-14 | End: 2018-01-14

## 2018-01-14 RX ADMIN — BETAMETHASONE SODIUM PHOSPHATE AND BETAMETHASONE ACETATE 12 MG: 3; 3 INJECTION, SUSPENSION INTRA-ARTICULAR; INTRALESIONAL; INTRAMUSCULAR at 11:01

## 2018-01-14 NOTE — TREATMENT PLAN
Pt presents to unit for NST and celestone injection. No c/o at this time. EFM x2 placed, positive FHT noted. POC reviewed with pt, verbalizes understanding.

## 2018-01-14 NOTE — DISCHARGE INSTRUCTIONS
Home Undelivered Discharge Instructions    After Discharge Orders:    Future Appointments  Date Time Provider Department Center   1/23/2018 9:15 AM Bri Enriquez MD [facility]     Follow up with MD in office.    Current Discharge Medication List      CONTINUE these medications which have NOT CHANGED    Details   acetaminophen (TYLENOL) 500 MG tablet Take 500 mg by mouth every 6 (six) hours as needed for Pain.      butalbital-acetaminophen-caffeine -40 mg (FIORICET, ESGIC) -40 mg per tablet Take 1-2 tablets by mouth every 6 (six) hours as needed for Headaches. For short term use only.  You may NOT take this medicine during the last 12 weeks (3 months) of your pregnancy.  Qty: 12 tablet, Refills: 0      ferrous sulfate 325 mg (65 mg iron) Tab tablet Take 1 tablet (325 mg total) by mouth once daily.  Qty: 30 tablet, Refills: 3    Associated Diagnoses: Prenatal care, subsequent pregnancy, first trimester      hydrocortisone 1 % cream Apply to affected area 2 times daily  Qty: 30 g, Refills: 0      ondansetron (ZOFRAN) 8 MG tablet Take 1 tablet (8 mg total) by mouth every 6 (six) hours as needed for Nausea.  Qty: 30 tablet, Refills: 0    Associated Diagnoses: Nausea and vomiting in pregnancy      PNV with Ca,no.74-iron-FA 27-1 mg Tab Take 1 tablet by mouth once daily.  Qty: 30 tablet, Refills: 11      prenatal vitamin #49-iron-FA 6.75 mg iron- 200 mcg Tab Take 1 tablet by mouth once daily.  Qty: 90 tablet, Refills: 2    Associated Diagnoses: Pregnancy, unspecified gestational age                     · Diet:  normal diet as tolerated    · Rest: normal activity as tolerated    Other instructions: Do kick counts once a day on your baby. Choose the time of day your baby is most active. Get in a comfortable lying or sitting position and time how long it takes to feel 10 kicks, twists, turns, swishes, or rolls. Call your physician or midwife if there have not been 10 kicks in 1 hours    Call physician or midwife,  return to Labor and Delivery, call 911, or go to the nearest Emergency Room if: increased leakage or fluid, contractions 2-5 minutes apart for 1 hour, decreased fetal movement, persistent low back pain or cramping, bleeding from vaginal area, difficulty urinating, pain with urination, difficulty breathing, new calf pain, persistent headache or vision change

## 2018-01-14 NOTE — TREATMENT PLAN
Discharge instructions given to pt, verbalizes understanding. Pt ambulated off unit with family at her side to return tomorrow for 2nd celestone injection.

## 2018-01-14 NOTE — TREATMENT PLAN
Notified Dr. Enriquez of pts arrival. Reviewed FHT with MD and pts request for juice and crackers. Stated to continue to monitor pt and okay to po hydrate pt and give crackers.

## 2018-01-15 PROBLEM — Z34.90 PREGNANCY WITH ONE FETUS: Status: ACTIVE | Noted: 2018-01-15

## 2018-01-16 ENCOUNTER — ANESTHESIA (OUTPATIENT)
Dept: OBSTETRICS AND GYNECOLOGY | Facility: HOSPITAL | Age: 28
End: 2018-01-16
Payer: MEDICAID

## 2018-01-16 ENCOUNTER — HOSPITAL ENCOUNTER (INPATIENT)
Facility: HOSPITAL | Age: 28
LOS: 4 days | Discharge: HOME | End: 2018-01-20
Attending: OBSTETRICS & GYNECOLOGY | Admitting: OBSTETRICS & GYNECOLOGY
Payer: MEDICAID

## 2018-01-16 ENCOUNTER — ANESTHESIA EVENT (OUTPATIENT)
Dept: OBSTETRICS AND GYNECOLOGY | Facility: HOSPITAL | Age: 28
End: 2018-01-16
Payer: MEDICAID

## 2018-01-16 ENCOUNTER — SURGERY (OUTPATIENT)
Age: 28
End: 2018-01-16

## 2018-01-16 DIAGNOSIS — Z34.90 PREGNANCY WITH ONE FETUS, ANTEPARTUM: Primary | ICD-10-CM

## 2018-01-16 DIAGNOSIS — Z34.93 PRENATAL CARE IN THIRD TRIMESTER: ICD-10-CM

## 2018-01-16 LAB
ABO + RH BLD: NORMAL
ALBUMIN SERPL BCP-MCNC: 2.8 G/DL
ALP SERPL-CCNC: 197 U/L
ALT SERPL W/O P-5'-P-CCNC: 13 U/L
ANION GAP SERPL CALC-SCNC: 9 MMOL/L
AST SERPL-CCNC: 12 U/L
BASOPHILS # BLD AUTO: 0 K/UL
BASOPHILS NFR BLD: 0 %
BILIRUB SERPL-MCNC: 0.2 MG/DL
BLD GP AB SCN CELLS X3 SERPL QL: NORMAL
BUN SERPL-MCNC: 6 MG/DL
CALCIUM SERPL-MCNC: 9.9 MG/DL
CHLORIDE SERPL-SCNC: 105 MMOL/L
CO2 SERPL-SCNC: 22 MMOL/L
CREAT SERPL-MCNC: 0.7 MG/DL
CREAT UR-MCNC: 35.7 MG/DL
DIFFERENTIAL METHOD: ABNORMAL
EOSINOPHIL # BLD AUTO: 0 K/UL
EOSINOPHIL NFR BLD: 0 %
ERYTHROCYTE [DISTWIDTH] IN BLOOD BY AUTOMATED COUNT: 13.5 %
EST. GFR  (AFRICAN AMERICAN): >60 ML/MIN/1.73 M^2
EST. GFR  (NON AFRICAN AMERICAN): >60 ML/MIN/1.73 M^2
GLUCOSE SERPL-MCNC: 136 MG/DL
HCT VFR BLD AUTO: 37.2 %
HGB BLD-MCNC: 12.9 G/DL
LDH SERPL L TO P-CCNC: 162 U/L
LYMPHOCYTES # BLD AUTO: 1.8 K/UL
LYMPHOCYTES NFR BLD: 12 %
MCH RBC QN AUTO: 30.8 PG
MCHC RBC AUTO-ENTMCNC: 34.7 G/DL
MCV RBC AUTO: 89 FL
MONOCYTES # BLD AUTO: 0.8 K/UL
MONOCYTES NFR BLD: 4.9 %
NEUTROPHILS # BLD AUTO: 12.6 K/UL
NEUTROPHILS NFR BLD: 83.1 %
PLATELET # BLD AUTO: 291 K/UL
PMV BLD AUTO: 11.5 FL
POTASSIUM SERPL-SCNC: 4 MMOL/L
PROT SERPL-MCNC: 7.1 G/DL
PROT UR-MCNC: <7 MG/DL
PROT/CREAT RATIO, UR: NORMAL
RBC # BLD AUTO: 4.19 M/UL
SODIUM SERPL-SCNC: 136 MMOL/L
URATE SERPL-MCNC: 4.4 MG/DL
WBC # BLD AUTO: 15.2 K/UL

## 2018-01-16 PROCEDURE — 25000003 PHARM REV CODE 250: Performed by: ANESTHESIOLOGY

## 2018-01-16 PROCEDURE — 82570 ASSAY OF URINE CREATININE: CPT

## 2018-01-16 PROCEDURE — 63600175 PHARM REV CODE 636 W HCPCS: Performed by: REGISTERED NURSE

## 2018-01-16 PROCEDURE — 51702 INSERT TEMP BLADDER CATH: CPT

## 2018-01-16 PROCEDURE — 37000008 HC ANESTHESIA 1ST 15 MINUTES: Performed by: OBSTETRICS & GYNECOLOGY

## 2018-01-16 PROCEDURE — 86592 SYPHILIS TEST NON-TREP QUAL: CPT

## 2018-01-16 PROCEDURE — 59510 CESAREAN DELIVERY: CPT | Mod: ANES,,, | Performed by: ANESTHESIOLOGY

## 2018-01-16 PROCEDURE — 80053 COMPREHEN METABOLIC PANEL: CPT

## 2018-01-16 PROCEDURE — 37000009 HC ANESTHESIA EA ADD 15 MINS: Performed by: OBSTETRICS & GYNECOLOGY

## 2018-01-16 PROCEDURE — 59871 REMOVE CERCLAGE SUTURE: CPT

## 2018-01-16 PROCEDURE — 84550 ASSAY OF BLOOD/URIC ACID: CPT

## 2018-01-16 PROCEDURE — 0UCC7ZZ EXTIRPATION OF MATTER FROM CERVIX, VIA NATURAL OR ARTIFICIAL OPENING: ICD-10-PCS | Performed by: OBSTETRICS & GYNECOLOGY

## 2018-01-16 PROCEDURE — 25000003 PHARM REV CODE 250: Performed by: REGISTERED NURSE

## 2018-01-16 PROCEDURE — 25000003 PHARM REV CODE 250: Performed by: OBSTETRICS & GYNECOLOGY

## 2018-01-16 PROCEDURE — 36415 COLL VENOUS BLD VENIPUNCTURE: CPT

## 2018-01-16 PROCEDURE — 86850 RBC ANTIBODY SCREEN: CPT

## 2018-01-16 PROCEDURE — 85025 COMPLETE CBC W/AUTO DIFF WBC: CPT

## 2018-01-16 PROCEDURE — 63600175 PHARM REV CODE 636 W HCPCS: Performed by: OBSTETRICS & GYNECOLOGY

## 2018-01-16 PROCEDURE — 36000685 HC CESAREAN SECTION LEVEL I

## 2018-01-16 PROCEDURE — S0028 INJECTION, FAMOTIDINE, 20 MG: HCPCS | Performed by: ANESTHESIOLOGY

## 2018-01-16 PROCEDURE — 27200918 HC ALEXIS O RING

## 2018-01-16 PROCEDURE — 11000001 HC ACUTE MED/SURG PRIVATE ROOM

## 2018-01-16 PROCEDURE — 83615 LACTATE (LD) (LDH) ENZYME: CPT

## 2018-01-16 PROCEDURE — 59510 CESAREAN DELIVERY: CPT | Mod: CRNA,,, | Performed by: REGISTERED NURSE

## 2018-01-16 RX ORDER — DEXAMETHASONE SODIUM PHOSPHATE 4 MG/ML
INJECTION, SOLUTION INTRA-ARTICULAR; INTRALESIONAL; INTRAMUSCULAR; INTRAVENOUS; SOFT TISSUE
Status: DISCONTINUED | OUTPATIENT
Start: 2018-01-16 | End: 2018-01-16

## 2018-01-16 RX ORDER — SIMETHICONE 80 MG
1 TABLET,CHEWABLE ORAL EVERY 6 HOURS PRN
Status: DISCONTINUED | OUTPATIENT
Start: 2018-01-16 | End: 2018-01-20 | Stop reason: HOSPADM

## 2018-01-16 RX ORDER — OXYCODONE AND ACETAMINOPHEN 5; 325 MG/1; MG/1
1 TABLET ORAL EVERY 4 HOURS PRN
Status: DISCONTINUED | OUTPATIENT
Start: 2018-01-16 | End: 2018-01-20 | Stop reason: HOSPADM

## 2018-01-16 RX ORDER — HYDROCORTISONE 25 MG/G
CREAM TOPICAL 3 TIMES DAILY PRN
Status: DISCONTINUED | OUTPATIENT
Start: 2018-01-16 | End: 2018-01-20 | Stop reason: HOSPADM

## 2018-01-16 RX ORDER — ONDANSETRON 2 MG/ML
4 INJECTION INTRAMUSCULAR; INTRAVENOUS EVERY 12 HOURS PRN
Status: DISCONTINUED | OUTPATIENT
Start: 2018-01-16 | End: 2018-01-16

## 2018-01-16 RX ORDER — FENTANYL CITRATE 50 UG/ML
INJECTION, SOLUTION INTRAMUSCULAR; INTRAVENOUS
Status: DISCONTINUED | OUTPATIENT
Start: 2018-01-16 | End: 2018-01-16

## 2018-01-16 RX ORDER — SODIUM CHLORIDE, SODIUM LACTATE, POTASSIUM CHLORIDE, CALCIUM CHLORIDE 600; 310; 30; 20 MG/100ML; MG/100ML; MG/100ML; MG/100ML
INJECTION, SOLUTION INTRAVENOUS CONTINUOUS
Status: DISCONTINUED | OUTPATIENT
Start: 2018-01-16 | End: 2018-01-16

## 2018-01-16 RX ORDER — ONDANSETRON 8 MG/1
8 TABLET, ORALLY DISINTEGRATING ORAL EVERY 8 HOURS PRN
Status: DISCONTINUED | OUTPATIENT
Start: 2018-01-16 | End: 2018-01-20 | Stop reason: HOSPADM

## 2018-01-16 RX ORDER — MISOPROSTOL 200 UG/1
800 TABLET ORAL
Status: DISCONTINUED | OUTPATIENT
Start: 2018-01-16 | End: 2018-01-16

## 2018-01-16 RX ORDER — ACETAMINOPHEN 10 MG/ML
1000 INJECTION, SOLUTION INTRAVENOUS EVERY 8 HOURS
Status: DISCONTINUED | OUTPATIENT
Start: 2018-01-16 | End: 2018-01-16

## 2018-01-16 RX ORDER — AMOXICILLIN 250 MG
1 CAPSULE ORAL NIGHTLY PRN
Status: DISCONTINUED | OUTPATIENT
Start: 2018-01-16 | End: 2018-01-20 | Stop reason: HOSPADM

## 2018-01-16 RX ORDER — BISACODYL 10 MG
10 SUPPOSITORY, RECTAL RECTAL ONCE AS NEEDED
Status: ACTIVE | OUTPATIENT
Start: 2018-01-17 | End: 2018-01-17

## 2018-01-16 RX ORDER — MUPIROCIN 20 MG/G
1 OINTMENT TOPICAL 2 TIMES DAILY
Status: DISCONTINUED | OUTPATIENT
Start: 2018-01-16 | End: 2018-01-20 | Stop reason: HOSPADM

## 2018-01-16 RX ORDER — NALOXONE HCL 0.4 MG/ML
0.02 VIAL (ML) INJECTION
Status: DISCONTINUED | OUTPATIENT
Start: 2018-01-16 | End: 2018-01-16

## 2018-01-16 RX ORDER — DOCUSATE SODIUM 100 MG/1
200 CAPSULE, LIQUID FILLED ORAL 2 TIMES DAILY
Status: DISCONTINUED | OUTPATIENT
Start: 2018-01-16 | End: 2018-01-20 | Stop reason: HOSPADM

## 2018-01-16 RX ORDER — MIDAZOLAM HYDROCHLORIDE 1 MG/ML
INJECTION INTRAMUSCULAR; INTRAVENOUS
Status: DISCONTINUED | OUTPATIENT
Start: 2018-01-16 | End: 2018-01-16

## 2018-01-16 RX ORDER — SODIUM CITRATE AND CITRIC ACID MONOHYDRATE 334; 500 MG/5ML; MG/5ML
30 SOLUTION ORAL
Status: DISCONTINUED | OUTPATIENT
Start: 2018-01-16 | End: 2018-01-16

## 2018-01-16 RX ORDER — LIDOCAINE HCL/EPINEPHRINE/PF 2%-1:200K
VIAL (ML) INJECTION
Status: DISCONTINUED | OUTPATIENT
Start: 2018-01-16 | End: 2018-01-16

## 2018-01-16 RX ORDER — ADHESIVE BANDAGE
30 BANDAGE TOPICAL 2 TIMES DAILY PRN
Status: DISCONTINUED | OUTPATIENT
Start: 2018-01-17 | End: 2018-01-20 | Stop reason: HOSPADM

## 2018-01-16 RX ORDER — MORPHINE SULFATE 1 MG/ML
INJECTION INTRAVENOUS CONTINUOUS
Status: DISCONTINUED | OUTPATIENT
Start: 2018-01-16 | End: 2018-01-17

## 2018-01-16 RX ORDER — DIPHENHYDRAMINE HYDROCHLORIDE 50 MG/ML
25 INJECTION INTRAMUSCULAR; INTRAVENOUS ONCE
Status: DISCONTINUED | OUTPATIENT
Start: 2018-01-16 | End: 2018-01-16

## 2018-01-16 RX ORDER — FAMOTIDINE 10 MG/ML
20 INJECTION INTRAVENOUS ONCE
Status: COMPLETED | OUTPATIENT
Start: 2018-01-16 | End: 2018-01-16

## 2018-01-16 RX ORDER — MUPIROCIN 20 MG/G
OINTMENT TOPICAL
Status: DISCONTINUED | OUTPATIENT
Start: 2018-01-16 | End: 2018-01-16

## 2018-01-16 RX ORDER — ACETAMINOPHEN 10 MG/ML
1000 INJECTION, SOLUTION INTRAVENOUS ONCE
Status: COMPLETED | OUTPATIENT
Start: 2018-01-16 | End: 2018-01-16

## 2018-01-16 RX ORDER — OXYTOCIN 10 [USP'U]/ML
INJECTION, SOLUTION INTRAMUSCULAR; INTRAVENOUS
Status: DISCONTINUED | OUTPATIENT
Start: 2018-01-16 | End: 2018-01-16

## 2018-01-16 RX ORDER — OXYTOCIN/RINGER'S LACTATE 20/1000 ML
41.65 PLASTIC BAG, INJECTION (ML) INTRAVENOUS CONTINUOUS
Status: ACTIVE | OUTPATIENT
Start: 2018-01-16 | End: 2018-01-16

## 2018-01-16 RX ORDER — IBUPROFEN 600 MG/1
600 TABLET ORAL EVERY 6 HOURS
Status: DISCONTINUED | OUTPATIENT
Start: 2018-01-16 | End: 2018-01-20 | Stop reason: HOSPADM

## 2018-01-16 RX ORDER — OXYCODONE AND ACETAMINOPHEN 10; 325 MG/1; MG/1
1 TABLET ORAL EVERY 4 HOURS PRN
Status: DISCONTINUED | OUTPATIENT
Start: 2018-01-16 | End: 2018-01-20 | Stop reason: HOSPADM

## 2018-01-16 RX ORDER — KETAMINE HYDROCHLORIDE 100 MG/ML
INJECTION, SOLUTION INTRAMUSCULAR; INTRAVENOUS
Status: DISCONTINUED | OUTPATIENT
Start: 2018-01-16 | End: 2018-01-16

## 2018-01-16 RX ORDER — PROPOFOL 10 MG/ML
VIAL (ML) INTRAVENOUS
Status: DISCONTINUED | OUTPATIENT
Start: 2018-01-16 | End: 2018-01-16

## 2018-01-16 RX ORDER — SODIUM CITRATE AND CITRIC ACID MONOHYDRATE 334; 500 MG/5ML; MG/5ML
30 SOLUTION ORAL ONCE
Status: DISCONTINUED | OUTPATIENT
Start: 2018-01-16 | End: 2018-01-16

## 2018-01-16 RX ORDER — SODIUM CHLORIDE, SODIUM LACTATE, POTASSIUM CHLORIDE, CALCIUM CHLORIDE 600; 310; 30; 20 MG/100ML; MG/100ML; MG/100ML; MG/100ML
INJECTION, SOLUTION INTRAVENOUS CONTINUOUS
Status: ACTIVE | OUTPATIENT
Start: 2018-01-16 | End: 2018-01-17

## 2018-01-16 RX ORDER — DIPHENHYDRAMINE HCL 25 MG
25 CAPSULE ORAL EVERY 4 HOURS PRN
Status: DISCONTINUED | OUTPATIENT
Start: 2018-01-16 | End: 2018-01-20 | Stop reason: HOSPADM

## 2018-01-16 RX ORDER — ONDANSETRON HYDROCHLORIDE 2 MG/ML
INJECTION, SOLUTION INTRAMUSCULAR; INTRAVENOUS
Status: DISCONTINUED | OUTPATIENT
Start: 2018-01-16 | End: 2018-01-16

## 2018-01-16 RX ORDER — METOCLOPRAMIDE HYDROCHLORIDE 5 MG/ML
10 INJECTION INTRAMUSCULAR; INTRAVENOUS ONCE
Status: DISCONTINUED | OUTPATIENT
Start: 2018-01-16 | End: 2018-01-16

## 2018-01-16 RX ADMIN — SODIUM CHLORIDE, SODIUM LACTATE, POTASSIUM CHLORIDE, AND CALCIUM CHLORIDE: 600; 310; 30; 20 INJECTION, SOLUTION INTRAVENOUS at 07:01

## 2018-01-16 RX ADMIN — MUPIROCIN 1 G: 20 OINTMENT TOPICAL at 08:01

## 2018-01-16 RX ADMIN — KETAMINE HYDROCHLORIDE 30 MG: 100 INJECTION, SOLUTION, CONCENTRATE INTRAMUSCULAR; INTRAVENOUS at 08:01

## 2018-01-16 RX ADMIN — IBUPROFEN 600 MG: 600 TABLET, FILM COATED ORAL at 06:01

## 2018-01-16 RX ADMIN — FENTANYL CITRATE 15 MCG: 50 INJECTION, SOLUTION INTRAMUSCULAR; INTRAVENOUS at 09:01

## 2018-01-16 RX ADMIN — FENTANYL CITRATE 25 MCG: 50 INJECTION, SOLUTION INTRAMUSCULAR; INTRAVENOUS at 08:01

## 2018-01-16 RX ADMIN — LIDOCAINE HYDROCHLORIDE,EPINEPHRINE BITARTRATE 5 ML: 20; .005 INJECTION, SOLUTION EPIDURAL; INFILTRATION; INTRACAUDAL; PERINEURAL at 08:01

## 2018-01-16 RX ADMIN — PROPOFOL 20 MG: 10 INJECTION, EMULSION INTRAVENOUS at 09:01

## 2018-01-16 RX ADMIN — OXYCODONE HYDROCHLORIDE AND ACETAMINOPHEN 1 TABLET: 5; 325 TABLET ORAL at 08:01

## 2018-01-16 RX ADMIN — OXYTOCIN 20 UNITS: 10 INJECTION, SOLUTION INTRAMUSCULAR; INTRAVENOUS at 09:01

## 2018-01-16 RX ADMIN — MORPHINE SULFATE: 1 INJECTION INTRAVENOUS at 10:01

## 2018-01-16 RX ADMIN — DEXAMETHASONE SODIUM PHOSPHATE 4 MG: 4 INJECTION, SOLUTION INTRAMUSCULAR; INTRAVENOUS at 08:01

## 2018-01-16 RX ADMIN — MIDAZOLAM HYDROCHLORIDE 2 MG: 1 INJECTION, SOLUTION INTRAMUSCULAR; INTRAVENOUS at 08:01

## 2018-01-16 RX ADMIN — SODIUM CHLORIDE, SODIUM LACTATE, POTASSIUM CHLORIDE, AND CALCIUM CHLORIDE: 600; 310; 30; 20 INJECTION, SOLUTION INTRAVENOUS at 12:01

## 2018-01-16 RX ADMIN — ONDANSETRON 4 MG: 2 INJECTION, SOLUTION INTRAMUSCULAR; INTRAVENOUS at 08:01

## 2018-01-16 RX ADMIN — PROPOFOL 20 MG: 10 INJECTION, EMULSION INTRAVENOUS at 08:01

## 2018-01-16 RX ADMIN — MORPHINE SULFATE: 1 INJECTION INTRAVENOUS at 03:01

## 2018-01-16 RX ADMIN — FAMOTIDINE 20 MG: 10 INJECTION INTRAVENOUS at 06:01

## 2018-01-16 RX ADMIN — SODIUM CITRATE AND CITRIC ACID MONOHYDRATE 30 ML: 500; 334 SOLUTION ORAL at 06:01

## 2018-01-16 RX ADMIN — IBUPROFEN 600 MG: 600 TABLET, FILM COATED ORAL at 12:01

## 2018-01-16 RX ADMIN — KETAMINE HYDROCHLORIDE 10 MG: 100 INJECTION, SOLUTION, CONCENTRATE INTRAMUSCULAR; INTRAVENOUS at 08:01

## 2018-01-16 RX ADMIN — SODIUM CHLORIDE, SODIUM LACTATE, POTASSIUM CHLORIDE, AND CALCIUM CHLORIDE: 600; 310; 30; 20 INJECTION, SOLUTION INTRAVENOUS at 08:01

## 2018-01-16 RX ADMIN — ACETAMINOPHEN 1000 MG: 10 INJECTION, SOLUTION INTRAVENOUS at 10:01

## 2018-01-16 RX ADMIN — PROPOFOL 10 MG: 10 INJECTION, EMULSION INTRAVENOUS at 08:01

## 2018-01-16 RX ADMIN — SODIUM CHLORIDE, SODIUM LACTATE, POTASSIUM CHLORIDE, AND CALCIUM CHLORIDE: 600; 310; 30; 20 INJECTION, SOLUTION INTRAVENOUS at 09:01

## 2018-01-16 RX ADMIN — FENTANYL CITRATE 10 MCG: 50 INJECTION, SOLUTION INTRAMUSCULAR; INTRAVENOUS at 07:01

## 2018-01-16 RX ADMIN — DOCUSATE SODIUM 200 MG: 100 CAPSULE, LIQUID FILLED ORAL at 12:01

## 2018-01-16 RX ADMIN — OXYTOCIN 30 UNITS: 10 INJECTION, SOLUTION INTRAMUSCULAR; INTRAVENOUS at 08:01

## 2018-01-16 RX ADMIN — MUPIROCIN 1 G: 20 OINTMENT TOPICAL at 12:01

## 2018-01-16 RX ADMIN — CEFAZOLIN 3 G: 1 INJECTION, POWDER, FOR SOLUTION INTRAVENOUS at 07:01

## 2018-01-16 RX ADMIN — SODIUM CHLORIDE, SODIUM LACTATE, POTASSIUM CHLORIDE, AND CALCIUM CHLORIDE 1000 ML: 600; 310; 30; 20 INJECTION, SOLUTION INTRAVENOUS at 06:01

## 2018-01-16 RX ADMIN — ONDANSETRON 4 MG: 2 INJECTION, SOLUTION INTRAMUSCULAR; INTRAVENOUS at 07:01

## 2018-01-16 NOTE — PROGRESS NOTES
I performed pericare on he patient. Fundus remains firm at the umbilicus with light bleeding observed. Emptied sol of 1500 cc of light yellow clear urine. LR infusing art 125 ml/hr.

## 2018-01-16 NOTE — INTERVAL H&P NOTE
The patient has been examined and the H&P has been reviewed:    I concur with the findings and no changes have occurred since H&P was written.    Anesthesia/Surgery risks, benefits and alternative options discussed and understood by patient/family.    She is a 26 yo  @ 36w0d for cerclage removal and repeat  due to h/o classical .  She is s/p BMZ series this week.  Of note, she has been on Sosa this pregnancy and has a h/o midline vertical skin incision with ventral hernia repair x2 subsequently.  Plan is for Pfannenstiel skin incision to try and avoid the mesh and previous repairs.  CHTN well controlled on no medications - preE labs drawn and wnl (P/C ratio pending, baseline urine protein 110 mg).  ABX prophylaxis - Ancef (previously took Keflex without allergy like Amoxicillin). 3 g due to obesity.  DVT prophy - SCDs.

## 2018-01-16 NOTE — ANESTHESIA PREPROCEDURE EVALUATION
2018    Pre-operative evaluation for DELIVERY- SECTION (N/A)    Catina Myers is a 27 y.o.  at 13w0d with pmhx of cervical insufficiency resulting in pre- term delivery at 24wks. She is now presenting for procedure noted above.     OB History    Para Term  AB Living   2 1 0 1 0 1   SAB TAB Ectopic Multiple Live Births   0 0 0 0 1      # Outcome Date GA Lbr Michele/2nd Weight Sex Delivery Anes PTL Lv   2 Current            1  12 24w0d  0.55 kg (1 lb 3.4 oz) F CS-Classical  N CAIN      Birth Comments: Abruption,transverse lie and hour glassing membranes transfer of care from         Past Medical History:   Diagnosis Date    Chlamydia     Chronic hypertension in obstetric context in second trimester     Flu     High-risk pregnancy     UTI (lower urinary tract infection)     Yeast infection        Past Surgical History:   Procedure Laterality Date     SECTION      HERNIA REPAIR           Vital Signs Range (Last 24H):  Temp:  [36.6 °C (97.8 °F)-36.9 °C (98.5 °F)]   Pulse:  [85-92]   Resp:  [18]   BP: (121-136)/(62-89)   SpO2:  [90 %]       CBC:   No results for input(s): WBC, RBC, HGB, HCT, PLT, MCV, MCH, MCHC in the last 720 hours.    CMP: No results for input(s): NA, K, CL, CO2, BUN, CREATININE, GLU, MG, PHOS, CALCIUM, ALBUMIN, PROT, ALKPHOS, ALT, AST, BILITOT in the last 720 hours.    INR:  No results for input(s): PT, INR, PROTIME, APTT in the last 720 hours.      Pre-op Assessment    I have reviewed the Patient Summary Reports.      I have reviewed the Medications.     Review of Systems  Anesthesia Hx:  No problems with previous Anesthesia Denies Hx of Anesthetic complications  Neg history of prior surgery. Denies Family Hx of Anesthesia complications.   Denies Personal Hx of Anesthesia complications.   Cardiovascular:   Hypertension Denies  MI.  Denies CAD.     Denies Angina.        Pulmonary:   Denies Pneumonia Denies COPD.  Denies Asthma.  Denies Shortness of breath.    Renal/:   Denies Chronic Renal Disease.     Hepatic/GI:   Denies GERD.    OB/GYN/PEDS:  , prior classical  five years ago due to abruption   Neurological:   Denies TIA. Denies CVA.    Endocrine:   Denies Diabetes.        Physical Exam  General:  Well nourished    Airway/Jaw/Neck:  Airway Findings: Mouth Opening: Normal Tongue: Normal  General Airway Assessment: Adult  Mallampati: III  Improves to II with phonation.  TM Distance: Normal, at least 6 cm  Jaw/Neck Findings:  Micrognathia: Negative Mandibular Fracture: Negative    Neck ROM: Normal ROM     Eyes/Ears/Nose:  EYES/EARS/NOSE FINDINGS: Normal   Dental:  Dental Findings: In tact, upper braces, lower braces   Chest/Lungs:  Chest/Lungs Findings: Clear to auscultation, Normal Respiratory Rate     Heart/Vascular:  Heart Findings: Rate: Normal  Rhythm: Regular Rhythm        Mental Status:  Mental Status Findings: Normal        Anesthesia Plan  Type of Anesthesia, risks & benefits discussed:  Anesthesia Type:  CSE  Patient's Preference:   Intra-op Monitoring Plan: standard ASA monitors  Intra-op Monitoring Plan Comments:   Post Op Pain Control Plan: PCA and multimodal analgesia  Post Op Pain Control Plan Comments:   Induction:    Beta Blocker:  Patient is not currently on a Beta-Blocker (No further documentation required).       Informed Consent: Patient understands risks and agrees with Anesthesia plan.  Questions answered. Anesthesia consent signed with patient.  ASA Score: 2     Day of Surgery Review of History & Physical:    H&P update referred to the surgeon.         Ready For Surgery From Anesthesia Perspective.

## 2018-01-16 NOTE — PROGRESS NOTES
36 0/7 week GA male infant born via C section to 28 yo  mother for previous C section with hernia repair x 2. Infant requiring vigorous stimulation with CPAP and PPV x 30 sec - 1 minute. Responded well. Apgars 6/8. Pink and crying. Mild retractions with occasional nasal flaring. [place] nurse will monitor closely.

## 2018-01-16 NOTE — TRANSFER OF CARE
"Anesthesia Transfer of Care Note    Patient: Catina Myers    Procedure(s) Performed: Procedure(s) (LRB):  DELIVERY- SECTION (N/A)    Patient location: Labor and Delivery    Anesthesia Type: CSE    Transport from OR: Transported from OR on room air with adequate spontaneous ventilation    Post pain: adequate analgesia    Post assessment: no apparent anesthetic complications and tolerated procedure well    Post vital signs: stable    Level of consciousness: awake, alert and oriented    Nausea/Vomiting: no nausea/vomiting    Transfer of care protocol was followed      Last vitals:   Visit Vitals  BP (!) 134/57   Pulse 62   Temp 36.4 °C (97.5 °F) (Oral)   Resp 20   Ht 5' 4" (1.626 m)   Wt 114.3 kg (252 lb)   LMP 2017 (Exact Date)   SpO2 100%   Breastfeeding? No   BMI 43.26 kg/m²     "

## 2018-01-16 NOTE — LACTATION NOTE
"Spoke with mother in LDR with baby skin to skin.  Mother requests to pump breasts and bottle feed only, does not want to put thew baby to the breast.  States that she had the same plan for her last  baby.  Discussed the option to put the baby to the breast at 36 weeks gestation.  Instructed on the risks of formula feeding including:   Lacks the nutrients found in colostrums to help prevent infection, mature the gut, aid in digestion and resist allergies   Contains artificial additives and preservatives which increases incidence of contamination   Increase spitting up due to slower digestion   Increased cost and requires preparation, including bottle sanitation and formula refrigeration   Increased incidence of NEC for the  baby   Increased risk of diabetes with family history, SIDS and ear infections   Skipped feedings for the breastfeeding mother increases chance of engorgement, mastitis and plugged ducts   Decreases breastfeeding babys appetite resulting in poor feeding session, decreased breast stimulation and poor milk supply   Exposes the breastfeeding baby to the possibility of allergic reactions and colic  Pt continues to request to pump and bottle feed EBM.  Will set up pumping within the first 6 hours of life.  Encouraged to call for assist prn.  States "understand" and verbalized appropriate recall.  "

## 2018-01-16 NOTE — H&P (VIEW-ONLY)
History and Physical - Obstetrics    Subjective:     Ms Myers is a 27 y.o.  @ 35w2d gestational age with an Estimated Date of Delivery: 18, who presents with the complaint of: nothing. Fetal Movement: normal.    Her current obstetrical history is significant for h/o cervical incompetence with Waldron cerclage in place and h/o PTL on [place] weekly w/ classical  with hernia and repair x2, and CHTN.    Review of Systems  Nine system ROS negative except for HPI      (Not in a hospital admission)    Review of patient's allergies indicates:   Allergen Reactions    Amoxil [amoxicillin]     Orange juice Hives    Toradol [ketorolac] Nausea And Vomiting        Past Medical History:   Diagnosis Date    Chlamydia     Chronic hypertension in obstetric context in second trimester     Flu     High-risk pregnancy     UTI (lower urinary tract infection)     Yeast infection        Past Surgical History:   Procedure Laterality Date     SECTION      HERNIA REPAIR         OB History      Para Term  AB Living    2 1 0 1 0 1    SAB TAB Ectopic Multiple Live Births    0 0 0 0 1          Social History     Social History    Marital status: Single     Spouse name: N/A    Number of children: N/A    Years of education: N/A     Occupational History    Not on file.     Social History Main Topics    Smoking status: Former Smoker    Smokeless tobacco: Never Used    Alcohol use No    Drug use: No    Sexual activity: Yes     Partners: Male     Birth control/ protection: None     Other Topics Concern    Not on file     Social History Narrative    No narrative on file       Family History   Problem Relation Age of Onset    No Known Problems Mother     No Known Problems Father     Diabetes Other     Hypertension Other        Objective:   Vital Signs (Most Recent)  BP: 122/83 (18 1400)  Fetal Heart Tones: NST reactive    General: no acute distress, alert and oriented to person,  place and time  Abdomen: gravid, no palpable contractions  Incision(s): midline vertical  Extremities: calves non-tender to palpation, no calf edema, erythema or palpable cords  Cervix: closed, cerclage in place    Laboratory: see results console    Assessment:     28 yo  @ 35w2d gestational age with:  h/o cervical incompetence with Waldron cerclage in place and h/o PTL on [place] weekly w/ classical  with hernia and repair x2, and CHTN    Plan:     To the OR for cerclage removal and repeat  before the onset of labor  BMZ series prior  SCDs/Ancef  preE labs

## 2018-01-16 NOTE — LACTATION NOTE
"Symphony pump and supplies brought to room.  Pt c/o pain at this time and requests to wait for pain to be controlled prior to initiating pumping.  Discussed initiation time should be between 3 - 6 hours of birth and encouraged to start pumping asap.  Telephone number left on white board, encouraged to call for assist when ready.  States "understand" and verbalized appropriate recall.  "

## 2018-01-16 NOTE — L&D DELIVERY NOTE
Repeat  Delivery Note    Date of Procedure:     Surgeon: Bri Enriquez MD    Procedure:  Cerclage Removal  Repeat  Delivery via Low Transverse Uterine Incision and Pfannensteil Skin Incision    Pre-operative Diagnosis:   1. IUP @ 36w0d  2. H/o classical   3. H/o ventral hernia repair x2  4. CHTN  5. Obesity    Post-operative Diagnosis: Same    Anesthesia: Combined spinal/epidural    Estimated Blood Loss: 560 cc    Specimen: None    Complications: None    Findings: Male infant in the vertex presentation; Apgar 9/9; weight not yet recorded; Normal uterus, tubes and ovaries. Mild omental adhesion to the uterine fundus.    Indication and Consent: Patient presented to labor and delivery for her planned  section.  The risks, benefits and alternatives of the procedure were discussed, including, but not limited to: visceral and vascular injury, infection, blood loss, need for blood transfusion, prolonged hospitalization, and reoperation.  The patient stated understanding and desire to proceed. All questions were answered.    Procedure: Patient taken to the operating room with IVFs running.  SCDs placed for VTE prophylaxis.  Ancef was given for infection prophylaxis.  Combined spinal/epidural anesthesia was administered without any difficulty and found to be adequate.  A sol catheter was placed to drain her bladder.      She was placed in the dorsal lithotomy position.  A speculum was placed.  The cerclage stitch was grasped with ring forceps and the cerclage was cut and completely removed without difficulty.  The patient was then repositioned.    She was prepped and draped in normal sterile fashion in the dorsal supine position.  A Pfannensteil skin incision made with the scalpel.  This incision was carried down to the fascia waith the Bovie.  The fascia was incised and this incision was extended laterally with curved albrecht scissors.  Kocher clamps were used to grasp the superior edge  of the fascia and the underlying pyramidalis and rectus abdominus muscles were dissected off sharply with the curved albrecht scissors.  Hemostasis was achieved with the Bovie.  The inferior edge of the fascia was grasped with the Kocher clamps and in a similar fashion, the underlying muscles were dissected off.  The muscles were  down the midline to the level of the pubic symphysis.  The peritoneum was identified and entered in an area free of any adherent bowel or bladder, and this opening was extended with gentle lateral traction.  A bladder blade was then placed into the abdomen.  A bladder flap was created by dissecting the vesicouterine peritoneum away from the lower uterine segment.  The bladder blade was removed and an Darin retractor was placed into the abdomen.  The lower uterine segment was incised in a transverse fashion, and this incision was extended laterally with blunt dissection.  The fetus was found in the vertex presentation.  The head was gently brought to the uterine incision.  With gentle fundal pressure the head was delivered followed by the rest of the body without any difficulty.  The mouth and nose were bulb suctioned.  The cord was clamped x2 and cut.  The infant was handed to the awaiting  team.  Cord blood was obtained.  The placenta was delivered spontaneously, intact.  IV Pitocin was administered to help facilitate uterine contractions.  The uterus, tubes and ovaries were exteriorized and found to be as noted above.  The omental adhesion was taken down with the Bovie.  A laparotomy sponge was used to wipe the inside of the uterus to help remove any remaining placental membranes.  The uterus was then closed with #1 chromic in a locking fashion.  A second imbricating layer with this same suture was then performed.  The uterine incision was inspected and found to be hemostatic.  A laparotomy sponge was used to wipe the blood clots and fluid in the abdomen and pelvis.  The  uterus, tubes and ovaries were then placed back into the abdominal cavity.  The uterine incision was re-inspected and continued to be hemostatic.  The bladder flap was reapproximated with 2-0 vicryl in a continuous fashion.  The Darin retractor was removed.  The peritoneum was closed with 2-0 vicryl in a continuous fashion.  The muscle was reapproximated with U-stiches and 2-0 vicryl.  The fascia was closed in a running fashion using 0 vicryl.  The subcutaneous tissue was closed with 2-0 plain gut.  The skin was reapproximated with Insorb absorbable staples.    The instrument and sponge count were correct x2.  The patient tolerated the procedure well and was transferred to the next level of care in stable condition.

## 2018-01-16 NOTE — ANESTHESIA POSTPROCEDURE EVALUATION
"Anesthesia Post Evaluation    Patient: Catina Myers    Procedure(s) Performed: Procedure(s) (LRB):  DELIVERY- SECTION (N/A)    Final Anesthesia Type: CSE  Patient location during evaluation: labor & delivery  Patient participation: Yes- Able to Participate  Level of consciousness: awake and alert and oriented  Post-procedure vital signs: reviewed and stable  Pain management: adequate  Airway patency: patent  PONV status at discharge: No PONV  Anesthetic complications: no      Cardiovascular status: blood pressure returned to baseline and hemodynamically stable  Respiratory status: unassisted, spontaneous ventilation and room air  Hydration status: euvolemic  Follow-up not needed.        Visit Vitals  /76   Pulse 75   Temp 36.4 °C (97.5 °F) (Oral)   Resp 20   Ht 5' 4" (1.626 m)   Wt 114.3 kg (252 lb)   LMP 2017 (Exact Date)   SpO2 (!) 84%   Breastfeeding? No   BMI 43.26 kg/m²       Pain/Roxana Score: Pain Rating Prior to Med Admin: 10 (2018 10:08 AM)      "

## 2018-01-16 NOTE — TREATMENT PLAN
Pt presents to unit for scheduled  and cerclage removal. Oriented to room and instructed on hibiclens shower, removal of jewelry. Will call out when shower is complete.

## 2018-01-16 NOTE — ANESTHESIA PROCEDURE NOTES
CSE    Patient location during procedure: OR  Start time: 2018 7:45 AM  Timeout: 2018 7:41 AM  End time: 2018 7:52 AM  Staffing  Anesthesiologist: DANIE TELLEZ  Resident/CRNA: OSMANI BOURGEOIS  Performed: anesthesiologist   Preanesthetic Checklist  Completed: patient identified, site marked, surgical consent, pre-op evaluation, timeout performed, IV checked, risks and benefits discussed and monitors and equipment checked  CSE  Patient position: sitting  Prep: ChloraPrep  Patient monitoring: heart rate, continuous pulse ox and frequent blood pressure checks  Approach: midline  Spinal Needle  Needle type: pencil-tip   Needle gauge: 25 G  Needle length: 5 in  Epidural Needle  Injection technique: BRENDA saline  Needle type: Tuohy   Needle gauge: 17 G  Needle length: 3.5 in  Needle insertion depth: 6 cm  Location: L3-4  Needle localization: anatomical landmarks  Catheter  Catheter type: [employer]  Catheter size: 19 G  Catheter at skin depth: 10 cm  Test dose: lidocaine 1.5% with Epi 1-to-200,000  Additional Documentation: incremental injection, no paresthesia on injection, negative test dose and negative aspiration for heme  Assessment  Intrathecal Medications:  Bolus administered: 1.6 mL of 0.75 and with dextrose bupivacaine  administered: primary anesthetic and 10 mcg of  fentanyl  Epinephrine added: none  Additional Notes  Patient had multiple episodes of false loss and was difficult to position as well as reach the epidural space. On the second attempt I was successful and patient had an uneventful dosing of the spinal and the epidural catheter threaded without issues. She reported sensory block and at one point we were even worried her block level had exceeded the T4 level because the patient reported difficulty breathing, but maintained good saturation and  strength. About fifteen minutes into the  she became very vocal with complaints of pain on the left side and we test dosed  the epidural and followed with dosing of the epidural without relief. Eventually we had to supplement with versed and ketamine once the baby was delivered. This was a very peculiar case.

## 2018-01-16 NOTE — PLAN OF CARE
Problem: Patient Care Overview  Goal: Plan of Care Review  Outcome: Ongoing (interventions implemented as appropriate)  VSS. Respirations unlabored. NADN. Patient is using morphine PCA pump and taking motrin for pain management. Patient tolerating liquids and was advanced to a regular diet. Pericare performed and patient has sol to gravity. Medipore tape is dry & intact over and ABD pad. Patient came to mother baby with abdominal binder on. SCDS are on. POC discussed with the patient, and the patient verbalized understanding.

## 2018-01-16 NOTE — LACTATION NOTE
01/16/18 1400   Maternal Infant Assessment   Breast Density Bilateral:;soft   Areola Bilateral:;elastic   Nipple(s) Bilateral:;everted   Breasts WDL   Breasts WDL WDL       Number Scale   Presence of Pain denies   Location - Side Bilateral   Location breast   Maternal Infant Feeding   Maternal Emotional State relaxed;assist needed   Time Spent (min) 15-30 min   Breastfeeding History   Breastfeeding History yes   Duration of Previous Breastfeeding pumped x 9 months   Equipment Type/Education   Pump Type Symphony   Breast Pump Type double electric, hospital grade   Breast Pump Flange Type hard   Breast Pump Flange Size 24 mm   Pumping Frequency (times) 1 # of times pumped   Lactation Referrals   Lactation Consult Initial assessment;Pump teaching;Knowledge deficit   Lactation Interventions   Breastfeeding Assistance milk expression/pumping   Maternal Breastfeeding Support encouragement offered;lactation counseling provided     The [employer] Symphony pump, tubing, collections containers and labels brought to bedside.  Discussed proper pump setting, Preemie+ or Standard for babys gestational age.  Instructed on proper usage of pump and to adjust suction according to maximum comfort level.  Verified appropriate flange fit.  Educated on the frequency and duration of pumping in order to promote and maintain a full milk supply.  Hands on pumping technique reviewed.  Encouraged hand expression after pumping.  Instructed on cleaning of breast pump parts.  Written instructions also given.  Pt verbalized understanding and appropriate recall for proper milk handling, collection, labeling, storage and transportation.

## 2018-01-17 LAB
BASOPHILS # BLD AUTO: 0.01 K/UL
BASOPHILS NFR BLD: 0.1 %
DIFFERENTIAL METHOD: ABNORMAL
EOSINOPHIL # BLD AUTO: 0 K/UL
EOSINOPHIL NFR BLD: 0.2 %
ERYTHROCYTE [DISTWIDTH] IN BLOOD BY AUTOMATED COUNT: 13.4 %
HCT VFR BLD AUTO: 33.9 %
HGB BLD-MCNC: 11.4 G/DL
LYMPHOCYTES # BLD AUTO: 2.5 K/UL
LYMPHOCYTES NFR BLD: 14.6 %
MCH RBC QN AUTO: 30 PG
MCHC RBC AUTO-ENTMCNC: 33.6 G/DL
MCV RBC AUTO: 89 FL
MONOCYTES # BLD AUTO: 1.6 K/UL
MONOCYTES NFR BLD: 9 %
NEUTROPHILS # BLD AUTO: 13.2 K/UL
NEUTROPHILS NFR BLD: 75.6 %
PLATELET # BLD AUTO: 264 K/UL
PMV BLD AUTO: 11.8 FL
RBC # BLD AUTO: 3.8 M/UL
RPR SER QL: NORMAL
WBC # BLD AUTO: 17.39 K/UL

## 2018-01-17 PROCEDURE — 25000003 PHARM REV CODE 250: Performed by: OBSTETRICS & GYNECOLOGY

## 2018-01-17 PROCEDURE — 11000001 HC ACUTE MED/SURG PRIVATE ROOM

## 2018-01-17 PROCEDURE — 36415 COLL VENOUS BLD VENIPUNCTURE: CPT

## 2018-01-17 PROCEDURE — 85025 COMPLETE CBC W/AUTO DIFF WBC: CPT

## 2018-01-17 RX ADMIN — OXYCODONE HYDROCHLORIDE AND ACETAMINOPHEN 1 TABLET: 10; 325 TABLET ORAL at 11:01

## 2018-01-17 RX ADMIN — DOCUSATE SODIUM 200 MG: 100 CAPSULE, LIQUID FILLED ORAL at 08:01

## 2018-01-17 RX ADMIN — OXYCODONE HYDROCHLORIDE AND ACETAMINOPHEN 1 TABLET: 10; 325 TABLET ORAL at 06:01

## 2018-01-17 RX ADMIN — IBUPROFEN 600 MG: 600 TABLET, FILM COATED ORAL at 11:01

## 2018-01-17 RX ADMIN — OXYCODONE HYDROCHLORIDE AND ACETAMINOPHEN 1 TABLET: 10; 325 TABLET ORAL at 03:01

## 2018-01-17 RX ADMIN — MUPIROCIN 1 G: 20 OINTMENT TOPICAL at 08:01

## 2018-01-17 RX ADMIN — OXYCODONE HYDROCHLORIDE AND ACETAMINOPHEN 1 TABLET: 10; 325 TABLET ORAL at 07:01

## 2018-01-17 RX ADMIN — IBUPROFEN 600 MG: 600 TABLET, FILM COATED ORAL at 06:01

## 2018-01-17 RX ADMIN — DOCUSATE SODIUM 200 MG: 100 CAPSULE, LIQUID FILLED ORAL at 09:01

## 2018-01-17 NOTE — LACTATION NOTE
01/17/18 0900   Maternal Infant Assessment   Breast Density Bilateral:;soft   Areola Bilateral:;elastic   Nipple(s) Bilateral:;everted   Breasts WDL   Breasts WDL WDL       Number Scale   Presence of Pain denies   Location - Side Bilateral   Location breast   Maternal Infant Feeding   Maternal Emotional State relaxed;independent   Time Spent (min) 0-15 min   Equipment Type/Education   Pump Type Symphony   Breast Pump Type double electric, hospital grade   Breast Pump Flange Type hard   Breast Pump Flange Size 24 mm   Pumping Frequency (times) 8 # of times pumped   Lactation Referrals   Lactation Consult Follow up;Pump teaching;Knowledge deficit   Lactation Interventions   Breastfeeding Assistance milk expression/pumping   Maternal Breastfeeding Support encouragement offered;lactation counseling provided   Pumping well 8 - 12 times in 24 hours with Symphony pump.  Appropriate labeling and storage of EBM noted.  Encouraged to call for assist prn.Pump parts sanitized with Medela microsteam bag.

## 2018-01-17 NOTE — LACTATION NOTE
Preparation and Hygiene:  1. Shower daily.  2. Wear a clean bra each day and wash daily in warm soapy water.  3. Change wet or moist breast pads frequently.  Moist pads can promote growth of germs.  Actively wash your hands, paying close attention to the area around and under your fingernails, thoroughly with soap and water for 15 seconds before pumping or handling your milk.  Re-wash your hands if you touch anything (scratching your nose, answering the phone, etc) while pumping or handling your milk.   4. Before pumping your breasts, assemble the pump collection kit and have ready the sterile container and labels.  Place these items on a clean surface next to the breast pump.  5. Each time after you have finished pumping, take apart all of the parts of the breast pump collection kit and place them in a separate cleaning container (do not place them in the sink).  Be sure to remove the yellow valve from the breast shield and separate the white membrane from the yellow valve.  Rinse all of these parts with cool water.  Then use a new sponge and/or bottle brush and dishwashing detergent to clean the parts.  Rinse off the soapy water with cool water and air dry on a clean towel covered with a clean cloth.  All parts may also be washed after each use in the top rack of a .  6. Once each day, sanitize all of the parts of the breast pump collection kit.  This can be done by boiling the kit parts for 10 minutes or by using a Quick Clean Micro-Steam Bag made by [employer]  7. If condensation appears in the tubing, continue to run the pump with the tubing attached for 1-2 minutes or until the tubing is dry.   8. Notify your babys nurse or doctor if you become ill or need to take any medication, even over-the-counter medicines.  Collection and Storage of Expressed Breast milk:         1. Pump your breasts at least 8 or more times every 24 hours.  Double pump (both breasts at the same time) for at least 15-20  minutes using the most suction that is comfortable.    2. Write the date and time of pumping and the name of any medications you are taking on the babys pre-printed hospital identification label.   3.    Do not touch the inside of the storage containers or lids.  4.        Tightly screw the lid onto the container and place immediately into the refrigerator for daily use.  5.    Expressed breast milk should be refrigerated or frozen within 4 hours of pumping.  6.        Do not store expressed breast milk on the door of your refrigerator or freezer             where the temperature is warmer.   7.        Refrigerated milk may be stored for up to 7 days.  At this point it can be moved to the freezer for 6 -12 months.  8.        Thaw frozen breast milk in overnight in the refrigerator.  Once milk is thawed it must be used within 24 hours.  9.        Refrigerated breast milk needs to be warmed to room temperature.  Warm by leaving unrefrigerated until it reached room temperature, or place sealed bottle into a cup of warm (not boiling) water or use a bottle warmer.               Never warm breast milk in a microwave or boiling water.    For any questions or concerns call the Lactation Department at Pumping for the Baby in NICU    Preparation and Hygiene:  Shower daily.  Wear a clean bra each day and wash daily in warm soapy water.  1. Change wet or moist breast pads frequently.  Moist pads can promote growth of germs.  2. Actively wash your hands, paying close attention to the area around and under your fingernails, thoroughly with soap and water for 15 seconds before pumping or handling your milk.  Re-wash your hands if you touch anything (scratching your nose, answering the phone, etc) while pumping or handling your milk.   3. Before pumping your breasts, assemble the pump collection kit and have ready the sterile container and labels.  Place these items on a clean surface next to the breast pump.  4. Each time after  you have finished pumping, take apart all of the parts of the breast pump collection kit and place them in a separate cleaning container (do not place them in the sink).  Be sure to remove the yellow valve from the breast shield and separate the white membrane from the yellow valve.  Rinse all of these parts with cool water.  Then use a new sponge and/or bottle brush and dishwashing detergent to clean the parts.  Rinse off the soapy water with cool water and air dry on a clean towel covered with a clean cloth.  All parts may also be washed after each use in the top rack of a .  5. Once each day, sanitize all of the parts of the breast pump collection kit.  This can be done by boiling the kit parts for 10 minutes or by using a Quick Clean Micro-Steam Bag made by [employer]  6. If condensation appears in the tubing, continue to run the pump with the tubing attached for 1-2 minutes or until the tubing is dry.   7. Notify your babys nurse or doctor if you become ill or need to take any medication, even over-the-counter medicines.  Collection and Storage of Expressed Breast milk:       1. Pump your breasts at least 8-10 times every 24 hours.  Double pump (both breasts at the same time) for at least 15-20 minutes using the most suction that is comfortable.    2. Write the date and time of pumping and the name of any medications you are taking on the babys pre-printed hospital identification label.   3. Place your babys pre-printed hospital identification label on each container of breast milk.  Additional pre-printed labels can be obtained from your babys nurse.  If your expressed breast milk is not correctly labeled, the nurse cannot feed the milk to the baby.       4.    Do not touch the inside of the storage containers or lids.  5.        Pour the amount of expressed milk needed for 1 of your babys feedings into each storage container.  Use a new container(s) for each pumping.  Additional storage  containers can be obtained from your babys nurse.  6. Tightly screw the lid onto the container and place immediately into the refrigerator for daily transportation to the hospital.   Do not freeze your milk unless asked to do so by your babys nurse.  However, if you are not able to visit your baby each day, place the expressed breast milk in the freezer.  7.     Expressed breast milk should be refrigerated or frozen within 4 hours of pumping.  8.         Do not store expressed breast milk on the door of your refrigerator or freezer where the temperature is warmer.   Transportation of Expressed Breast milk:  1. Refrigerated breast milk or frozen milk should be packed tightly together in a cooler with frozen, gel-packs to keep the milk frozen.  DO NOT USE ICE CUBES (WET ICE) TO TRANSPORT FROZEN MILK.   A clean towel can be used to fill any extra space between containers of frozen milk.  2.    Bring your expressed milk from home each time you visit the baby.

## 2018-01-17 NOTE — PHYSICIAN QUERY
"PT Name: Catina Myers  MR #: [id number]     Physician Query Form - Documentation Clarification      CDS/: CALIN Garcia,RNC-MNN           Contact information:[email or web address]    This form is a permanent document in the medical record.     Query Date: 2018    By submitting this query, we are merely seeking further clarification of documentation. Please utilize your independent clinical judgment when addressing the question(s) below.    The Medical record reflects the following:    Supporting Clinical Findings Location in Medical Record   Pre-operative Diagnosis:   Obesity    Procedure:  Cerclage Removal  Repeat  Delivery via Low Transverse Uterine Incision and Pfannensteil Skin Incision    BMI=43.3  Ht=5' 4" (1.626 m)  Vt=550.3 kg (252 lb) L&D Delivery note                   Anthropometrics                                                                                       Doctor, Please specify diagnosis or diagnoses associated with above clinical findings.    Provider Use Only      [ x ] Morbid obesity complicating childbirth  [  ] Other, please specify:__________________________________                                                                                                                   [  ] Clinically undetermined            "

## 2018-01-17 NOTE — PLAN OF CARE
Problem: Patient Care Overview  Goal: Plan of Care Review  Pt progressing well. NAD noted. VSS. Pain well controlled. POC discussed with mother. Understanding verbalized.

## 2018-01-17 NOTE — LACTATION NOTE
This note was copied from a baby's chart.  Safe formula feeding handout given and reviewed.  Discussed proper hand washing, expiration time of formula, position of baby, position of nipple and bottle while feeding, baby led feeding and fullness cues.  Pt verbalized understanding and verbalized appropriate recall.Instructed on safe formula feeding, preparation and transporting of pre-mixed feedings.  Including:   Use of thoroughly cleaned and sterilized BPA free bottles   Formula & water preference to be determined by the advice of the pediatrician   Proper hand washing   Follow all s guidelines for preparing formula   Check expiration dates   Clean all can tops with soap and water prior to opening; also use a clean can opener   Mixed formula can be stored in the refrigerator for up to 24 hours according to the World Health Organization   Never microwave bottles   Correct position of baby, nipple in the mouth and bottle position   Infant led feeding   Formula expires 1 hour after in initiation of the feeding   All mixed formula should be refrigerated until immediately prior to transport   Transport in a cool insulated bag with ice packs and use within 2 hours or re-refrigerate at arrival destination   Re-warm feeding at the destination for no longer than 15 minutes  Pt verbalized understanding and provided appropriate recall.Safe formula feeding handout given and reviewed.  Discussed proper hand washing, expiration time of formula, position of baby, position of nipple and bottle while feeding, baby led feeding and fullness cues.  Pt verbalized understanding and verbalized appropriate recall.

## 2018-01-17 NOTE — PROGRESS NOTES
Catina Myers is a 27 y.o. female patient.    Past Medical History:   Diagnosis Date    Chlamydia     Chronic hypertension in obstetric context in second trimester     Flu     High-risk pregnancy     UTI (lower urinary tract infection)     Yeast infection        Review of patient's allergies indicates:   Allergen Reactions    Amoxil [amoxicillin]     Orange juice Hives    Toradol [ketorolac] Nausea And Vomiting       Vitals:    18 0745   BP: 99/62   Pulse: 64   Resp: 18   Temp: 97.7 °F (36.5 °C)       Post Op Day: 1    Subjective:  No Complaints    Objective:   Chest: Clear  Cor: Regular Rate Rhythm   Abdomen: Soft, POS BS  Incision: Clean, Dry, Intact    Assessment & Plan:   Normal Post Op   Routine care      ASHLEY DEAL  2018

## 2018-01-18 PROCEDURE — 11000001 HC ACUTE MED/SURG PRIVATE ROOM

## 2018-01-18 PROCEDURE — 25000003 PHARM REV CODE 250: Performed by: ANESTHESIOLOGY

## 2018-01-18 PROCEDURE — 25000003 PHARM REV CODE 250: Performed by: OBSTETRICS & GYNECOLOGY

## 2018-01-18 RX ORDER — CYCLOBENZAPRINE HCL 5 MG
5 TABLET ORAL 3 TIMES DAILY PRN
Status: DISCONTINUED | OUTPATIENT
Start: 2018-01-18 | End: 2018-01-20 | Stop reason: HOSPADM

## 2018-01-18 RX ADMIN — DOCUSATE SODIUM 200 MG: 100 CAPSULE, LIQUID FILLED ORAL at 08:01

## 2018-01-18 RX ADMIN — IBUPROFEN 600 MG: 600 TABLET, FILM COATED ORAL at 06:01

## 2018-01-18 RX ADMIN — CYCLOBENZAPRINE HYDROCHLORIDE 5 MG: 5 TABLET, FILM COATED ORAL at 08:01

## 2018-01-18 RX ADMIN — OXYCODONE HYDROCHLORIDE AND ACETAMINOPHEN 1 TABLET: 10; 325 TABLET ORAL at 04:01

## 2018-01-18 RX ADMIN — OXYCODONE HYDROCHLORIDE AND ACETAMINOPHEN 1 TABLET: 10; 325 TABLET ORAL at 12:01

## 2018-01-18 RX ADMIN — DOCUSATE SODIUM 200 MG: 100 CAPSULE, LIQUID FILLED ORAL at 09:01

## 2018-01-18 RX ADMIN — IBUPROFEN 600 MG: 600 TABLET, FILM COATED ORAL at 12:01

## 2018-01-18 RX ADMIN — OXYCODONE HYDROCHLORIDE AND ACETAMINOPHEN 1 TABLET: 10; 325 TABLET ORAL at 06:01

## 2018-01-18 RX ADMIN — MUPIROCIN 1 G: 20 OINTMENT TOPICAL at 09:01

## 2018-01-18 RX ADMIN — OXYCODONE HYDROCHLORIDE AND ACETAMINOPHEN 1 TABLET: 10; 325 TABLET ORAL at 08:01

## 2018-01-18 NOTE — PLAN OF CARE
Problem: Patient Care Overview  Goal: Plan of Care Review  Outcome: Ongoing (interventions implemented as appropriate)  Vss  Pt ambulating and voiding  Pain controlled with heating pad, motrin and percocet.  Plan of care reviewed with pt, all questions  and concerns addressed.

## 2018-01-18 NOTE — PLAN OF CARE
Problem: Patient Care Overview  Goal: Plan of Care Review  Outcome: Ongoing (interventions implemented as appropriate)  Mother encouraged to provide breastmilk for her NICU baby. Benefits of breastmilk discussed with mother. Mother declines pumping at this time. Mother encouraged to inform nurse if she changes her mind.Mother  from her baby. Mother encouraged to provide breastmilk by pumping. Benefits of breastmilk discussed. Breastpump initiated. Mother educated on pump use, cleaning pump parts, labeling containers and storage of breastmilk. Mother to call her nurse with further questions.

## 2018-01-18 NOTE — LACTATION NOTE
01/18/18 0830   Breasts WDL   Breasts WDL WDL       Number Scale   Presence of Pain denies   Location nipple(s)   Equipment Type/Education   Pump Type Symphony   Breast Pump Type double electric, hospital grade   Breast Pump Flange Type hard   Breast Pump Flange Size 24 mm   Breast Pumping Bilateral Breasts:;pumped until emptied   Lactation Referrals   Lactation Consult Pump teaching;Follow up   Lactation Interventions   Attachment Promotion counseling provided;privacy provided;role responsibility promoted   Breast Care: Breastfeeding milk massaged towards nipple   Breastfeeding Assistance electric breast pump used;milk expression/pumping;support offered   Maternal Breastfeeding Support encouragement offered;lactation counseling provided   mother pumping independently -denies any nipple pain -breasts starting to feel uncomfortable with filling -mother doing breast massage to prevent engorgement -encouraged to call for any assistance

## 2018-01-18 NOTE — PLAN OF CARE
Problem: Breastfeeding (Adult,Obstetrics,Pediatric)  Goal: Signs and Symptoms of Listed Potential Problems Will be Absent, Minimized or Managed (Breastfeeding)  Signs and symptoms of listed potential problems will be absent, minimized or managed by discharge/transition of care (reference Breastfeeding (Adult,Obstetrics,Pediatric) CPG).   Outcome: Ongoing (interventions implemented as appropriate)  Plan pumping at least 8 times in 24 hours while  from infant in NICU

## 2018-01-18 NOTE — PLAN OF CARE
Problem: Patient Care Overview  Goal: Plan of Care Review  Outcome: Ongoing (interventions implemented as appropriate)  Patient c/o pain to upper back that radiates to neck. Only relief is from Percocet. Dr. Vargas here to evaluate.

## 2018-01-18 NOTE — PROGRESS NOTES
Patient is postpartum day 2.  C/o pain in her neck and upper back.  +flatus, gael reg diet, pumping, ambulating w/o probs    Vitals:    01/17/18 2018 01/18/18 0024 01/18/18 0417 01/18/18 0800   BP: 114/65 116/62 (!) 110/55 (!) 111/59   BP Location: Left arm      Patient Position: Sitting      Pulse: 70 77 69 79   Resp: 18 18 20 17   Temp: 97.6 °F (36.4 °C) 98.4 °F (36.9 °C) 97.8 °F (36.6 °C) 98.1 °F (36.7 °C)   TempSrc: Oral      SpO2:       Weight:       Height:             Abd soft fundus firm and nontender   Fundus firm, nontender  Incision clean dry intact   Neck nontender, full ROM  Hct 33    A&P   Post partum, musculoskeletal pain  Routine postpartum care, pain mgmt, heating pad  Plan d/c tomorrow

## 2018-01-19 PROCEDURE — 25000003 PHARM REV CODE 250: Performed by: OBSTETRICS & GYNECOLOGY

## 2018-01-19 PROCEDURE — 11000001 HC ACUTE MED/SURG PRIVATE ROOM

## 2018-01-19 PROCEDURE — 25000003 PHARM REV CODE 250: Performed by: ANESTHESIOLOGY

## 2018-01-19 RX ADMIN — DOCUSATE SODIUM 200 MG: 100 CAPSULE, LIQUID FILLED ORAL at 08:01

## 2018-01-19 RX ADMIN — MUPIROCIN 1 G: 20 OINTMENT TOPICAL at 09:01

## 2018-01-19 RX ADMIN — OXYCODONE HYDROCHLORIDE AND ACETAMINOPHEN 1 TABLET: 10; 325 TABLET ORAL at 05:01

## 2018-01-19 RX ADMIN — IBUPROFEN 600 MG: 600 TABLET, FILM COATED ORAL at 12:01

## 2018-01-19 RX ADMIN — IBUPROFEN 600 MG: 600 TABLET, FILM COATED ORAL at 11:01

## 2018-01-19 RX ADMIN — CYCLOBENZAPRINE HYDROCHLORIDE 5 MG: 5 TABLET, FILM COATED ORAL at 01:01

## 2018-01-19 RX ADMIN — OXYCODONE HYDROCHLORIDE AND ACETAMINOPHEN 1 TABLET: 10; 325 TABLET ORAL at 08:01

## 2018-01-19 RX ADMIN — IBUPROFEN 600 MG: 600 TABLET, FILM COATED ORAL at 06:01

## 2018-01-19 RX ADMIN — IBUPROFEN 600 MG: 600 TABLET, FILM COATED ORAL at 05:01

## 2018-01-19 RX ADMIN — CYCLOBENZAPRINE HYDROCHLORIDE 5 MG: 5 TABLET, FILM COATED ORAL at 11:01

## 2018-01-19 RX ADMIN — DOCUSATE SODIUM 200 MG: 100 CAPSULE, LIQUID FILLED ORAL at 09:01

## 2018-01-19 RX ADMIN — OXYCODONE HYDROCHLORIDE AND ACETAMINOPHEN 1 TABLET: 10; 325 TABLET ORAL at 02:01

## 2018-01-19 NOTE — PROGRESS NOTES
Patient is postpartum day 3.  Doing well, no complaints.    Temp:  [98 °F (36.7 °C)-98.5 °F (36.9 °C)] 98.4 °F (36.9 °C)  Pulse:  [69-79] 69  Resp:  [14-20] 20  BP: (112-132)/(59-72) 118/59  Abd: soft, appro tender, nd   Fundus: firm, nontender        Recent Labs  Lab 01/17/18  0706   WBC 17.39*   HGB 11.4*   HCT 33.9*          A&P  Post partum, doing well  Routine postpartum care

## 2018-01-19 NOTE — PLAN OF CARE
"Discharge planning copied from baby chart  NICU/MB/LD DISCHARGE ASSESSMENT     NAME: Harpal Bullock  DX:            Hypoglycemia,  [P70.4]   Unknown       36 0/7 week GA infant. Nippling 30 - 33 ml Enfamil NB overnight, C/S 32-49. Transferred to NICU for management of hypoglycemia  Plan: continue to feed ad jennifer. Start IVF at 80 ml/kg/d. Continue to monitor C/S prior to each feeding.       infant of 36 completed weeks of gestation [P07.39]   Unknown       Repeat C section done at 36 0/7 weeks GA for previous classical C section. Consulted Lactation, Nutrition, and . Provide age appropriate care.     Single liveborn infant [Z38.2]          Birth Hospital: [facility]     Birth Wt: 5# 7.5 oz  Birth Ln: 1'7"  EGA: 36 w 0 d     DEMOGRAPHICS     Mother: Catina Myers  Address: [address] [place] [id number]  Phone: [phone]  Employer:     Father: Harjit Bullock  Address: same  Phone [phone]  Employer:  Signed Birth Certificate: yes     Support Persons: Ms Myers sister Johanna  Siblings: 5 yr old sister     CLINICAL     Pediatrician:   Pharmacy:[facility] in [place]     SW met with pt's mother and introduced herself to complete NICU assessment. Pt's mother was easily engaged. SW explained her role in . Pt's mother voiced understanding.      DIscharge planning assessment completed. Pt will be residing with parents and sister at current address. Pt's mother has basic essential needs such as crib and carseat. SW inquired about feedings. Mom voiced that she will be breast feeding pt. Mom is linked to [facility]. SW informed mom of the importance of using a hospital grade pump and obtaining one from [facility].SW will provide letter for mother in event she discharges home prior to patient.  Mom voiced understanding. Mom has transportation to and from the hospital and for when Pt is discharged home.  Mom verified Pt's insurance. SW informed Mom of having pt added to her " insurance within 30 days. Mom voiced understanding. SW provided written information regarding [facility] Health Plans, SSI, Early Steps, Healthy Start, and Immunizations. Mom voiced understanding.      Mom has no concerns or questions at this time. SW will continue to follow Pt while in the NICU

## 2018-01-19 NOTE — NURSING
"Patient complains of pain between shoulders that radiates to neck.  Patient states pain is "getting worse'' since she held the baby.  Anesthesiologist notified and will evaluate.  "

## 2018-01-19 NOTE — PLAN OF CARE
Problem: Breastfeeding (Adult,Obstetrics,Pediatric)  Goal: Signs and Symptoms of Listed Potential Problems Will be Absent, Minimized or Managed (Breastfeeding)  Signs and symptoms of listed potential problems will be absent, minimized or managed by discharge/transition of care (reference Breastfeeding (Adult,Obstetrics,Pediatric) CPG).   Outcome: Ongoing (interventions implemented as appropriate)  Pumping independently and without difficulty. Encouraged to pump 8 or more in 24 hours. Understanding verbalized.

## 2018-01-19 NOTE — NURSING
On rounds-pt found lying in bed.  45ml EBM noted on bedside table.  States headache much better.  Milk labelled and brought to NICU per RAMIRO Diaz.

## 2018-01-19 NOTE — PROGRESS NOTES
Patient refuses flu vaccine. States she already received this season. Given Tdap VIS. States would like to get prior to discharge.

## 2018-01-19 NOTE — PLAN OF CARE
Problem: Infection, Risk/Actual (Adult)  Goal: Identify Related Risk Factors and Signs and Symptoms  Related risk factors and signs and symptoms are identified upon initiation of Human Response Clinical Practice Guideline (CPG)   Outcome: Ongoing (interventions implemented as appropriate)  Afebrile. No s/s of infection.

## 2018-01-19 NOTE — PROGRESS NOTES
EBM brought to NICU per patient request. Encouraged patient to continue pumping 8 or more in 24. Understanding verbalized. Advised NICU nurse of cyclobenzaprine patient now taking for neck pain.

## 2018-01-19 NOTE — PLAN OF CARE
Problem: Breastfeeding (Adult,Obstetrics,Pediatric)  Goal: Signs and Symptoms of Listed Potential Problems Will be Absent, Minimized or Managed (Breastfeeding)  Signs and symptoms of listed potential problems will be absent, minimized or managed by discharge/transition of care (reference Breastfeeding (Adult,Obstetrics,Pediatric) CPG).   Outcome: Ongoing (interventions implemented as appropriate)  Plan breastfeed today in NICU or  pump at least 8 times in 24 hours -reinforced pumping after breastfeeding for emptying

## 2018-01-19 NOTE — LACTATION NOTE
This note was copied from a baby's chart.     01/19/18 0915   Maternal Infant Assessment   Breast Density Bilateral:;filling   Areola Bilateral:;elastic   Nipple(s) Bilateral:;everted   Infant Assessment   Sucking Reflex present   Rooting Reflex present   Swallow Reflex present   LATCH Score   Latch 2-->grasps breast, tongue down, lips flanged, rhythmic sucking   Audible Swallowing 2-->spontaneous and intermittent (24 hrs old)   Type Of Nipple 2-->everted (after stimulation)   Comfort (Breast/Nipple) 2-->soft/nontender   Hold (Positioning) 1-->minimal assist, teach one side: mother does other, staff holds   Score (less than 7 for 2/more consecutive times, consult Lactation Consultant) 9   Maternal Infant Feeding   Maternal Emotional State assist needed   Infant Positioning cross-cradle   Signs of Milk Transfer audible swallow;infant jaw motion present   Presence of Pain no   Time Spent (min) 15-30 min   Breastfeeding Education adequate infant intake;adequate milk volume;importance of skin-to-skin contact;increasing milk supply;milk expression, electric pump   Infant First Feeding   Breastfeeding breastfeeding, left side only   Feeding Infant   Feeding Readiness Cues rooting   Effective Latch During Feeding yes   Suck/Swallow Coordination present   Lactation Referrals   Lactation Consult Breastfeeding assessment;Initial assessment   Lactation Interventions   Attachment Promotion breastfeeding assistance provided;counseling provided;privacy provided;role responsibility promoted;skin-to-skin contact encouraged   Latch Promotion positioning assisted;infant's mouth opened gently;infant moved to breast;suck stimulated with colostrum drop   mother in NICU bottle feeding EBM -offered assist to breastfeed and mother agrees -assist to position for latch and baby latches easily for strong sucking with swallows noted -mother denies discomfort but states very strong sucking -review some basic breastfeeding information with  mother -encouraged to pump after feeding as baby only stayed on for about 5 minutes after taking 45ml of EBM prior to breastfeeding -encouraged plan to breastfeed first before offering EBM or formula -states understanding

## 2018-01-19 NOTE — NURSING
"Just returned from NICU and breastfeeding baby.  States "they want me to pump but my head is killing me."  Complains of Headache over eyes and across back of neck. Also complains of lights bothering her.  Flexeril given.  Lights dimmed.    "

## 2018-01-19 NOTE — NURSING
12:15 dose of percocet 10 did not scan at time given.  When later observed,  the medication did not appear in chart on the MAR.  Medication at 1800 given and scanned without difficulty.   Medication reentered at appropriate time given.

## 2018-01-19 NOTE — PLAN OF CARE
Problem: Patient Care Overview  Goal: Individualization & Mutuality  Hospital Problem List   Date Reviewed: 2018        Codes Priority Class Noted POA  Prenatal care in third trimester ICD-10-CM: Z34.93  ICD-9-CM: V22.1   2018 Not Applicable    Non-Hospital Problem List   Date Reviewed: 2018        Codes Priority Class Noted  Incompetent cervix ICD-10-CM: N88.3  ICD-9-CM: 622.5   2017  History of classical  section ICD-10-CM: Z98.891  ICD-9-CM: V45.89   2017  Waldron cerclage present, antepartum ICD-10-CM: O34.30  ICD-9-CM: 654.53   2017  History of  delivery, currently pregnant in second trimester ICD-10-CM: O09.212  ICD-9-CM: V23.41   Unknown  Chronic hypertension in obstetric context in second trimester ICD-10-CM: O10.912  ICD-9-CM: 642.03   Unknown  Pregnancy with one fetus ICD-10-CM: Z34.90  ICD-9-CM: V22.1   1/15/2018         Outcome: Ongoing (interventions implemented as appropriate)  VSS. Afebrile. LTV incision with loose steri-strips. Incision well-approximated. Abdominal binder in place. C/o muscle soreness at back of neck and shoulders relieved with PRN Flexeril. Incisional pain and cramping relieved with scheduled Motrin and PRN Percocet. Ambulating and voiding. Tolerating regular diet. Pumping EBM for infant in NICU without difficulty. Wants Tdap prior to discharge. POC discussed and understanding verbalized. LATISHA.

## 2018-01-19 NOTE — NURSING
Verbalizes pain score down to 6 but only hurts on left side of neck and a little across incision.  Denies wanting flexeril at this time.

## 2018-01-19 NOTE — NURSING
"States neck pain not as severe as yesterday.  Verbalizes pain "8/10" but wants percocet instead of flexeril-"it makes me too sleepy and I want to go see my baby."  Percocet given.  Instructed to call for assistance with ambulation to and/or from NICU.    "

## 2018-01-20 VITALS
TEMPERATURE: 98 F | BODY MASS INDEX: 43.02 KG/M2 | HEART RATE: 67 BPM | WEIGHT: 252 LBS | HEIGHT: 64 IN | DIASTOLIC BLOOD PRESSURE: 64 MMHG | OXYGEN SATURATION: 100 % | RESPIRATION RATE: 18 BRPM | SYSTOLIC BLOOD PRESSURE: 140 MMHG

## 2018-01-20 PROCEDURE — 25000003 PHARM REV CODE 250: Performed by: ANESTHESIOLOGY

## 2018-01-20 PROCEDURE — 25000003 PHARM REV CODE 250: Performed by: OBSTETRICS & GYNECOLOGY

## 2018-01-20 RX ORDER — IBUPROFEN 600 MG/1
600 TABLET ORAL EVERY 6 HOURS
Qty: 40 TABLET | Refills: 0 | Status: SHIPPED | OUTPATIENT
Start: 2018-01-20 | End: 2019-04-08

## 2018-01-20 RX ORDER — OXYCODONE AND ACETAMINOPHEN 5; 325 MG/1; MG/1
1 TABLET ORAL EVERY 4 HOURS PRN
Qty: 28 TABLET | Refills: 0 | Status: SHIPPED | OUTPATIENT
Start: 2018-01-20 | End: 2018-02-01 | Stop reason: SDUPTHER

## 2018-01-20 RX ADMIN — IBUPROFEN 600 MG: 600 TABLET, FILM COATED ORAL at 11:01

## 2018-01-20 RX ADMIN — IBUPROFEN 600 MG: 600 TABLET, FILM COATED ORAL at 05:01

## 2018-01-20 RX ADMIN — CYCLOBENZAPRINE HYDROCHLORIDE 5 MG: 5 TABLET, FILM COATED ORAL at 07:01

## 2018-01-20 RX ADMIN — OXYCODONE HYDROCHLORIDE AND ACETAMINOPHEN 1 TABLET: 10; 325 TABLET ORAL at 07:01

## 2018-01-20 RX ADMIN — CYCLOBENZAPRINE HYDROCHLORIDE 5 MG: 5 TABLET, FILM COATED ORAL at 05:01

## 2018-01-20 NOTE — PLAN OF CARE
Problem: Patient Care Overview  Goal: Plan of Care Review  Outcome: Ongoing (interventions implemented as appropriate)  Tolerating regular diet.  Voiding and passing flatus.  Overall states neck pain better than yesterday but still has spasms.  Taking Flexeril, percocet, and ibuprofen.  Utilitzing heating pad.  Encouraged to alternate heating pad with ice packs.  Plans to get up and shower.  S.O. At bedside massaging neck and assisting with care.  Ambulates to NICU to feed baby when able.  Pumps breasts when able.  Verbalizes knowledge of plan of care.

## 2018-01-20 NOTE — DISCHARGE INSTRUCTIONS
Breastfeeding discharge instructions given with First Alert form and reviewed.  Also discussed:   AAP recommendation of exclusive breastfeeding for the first 6 months of life and continued breastfeeding with the introduction of supplemental foods beyond the first year of life.  Instructed on the recommendation to delay all bottle and pacifier use until after 4 weeks of age and breastfeeding is well established.  Discussed the benefits of exclusive breastfeeding for both mother and baby.  Discussed the risks of supplementation/pacifier use on the exclusivity of breastfeeding in the first 6 months. Feed the baby at the earliest sign of hunger or comfort  o Hands to mouth, sucking motions  o Rooting or searching for something to suck on  o Dont wait for crying - it is a not a late sign of hunger; it is a sign of distress     The feedings may be 8-12 times per 24hrs and will not follow a schedule   Alternate the breast you start the feeding with, or start with the breast that feels the fullest   Switch breasts when the baby takes himself off the breast or falls asleep   Keep offering breasts until the baby looks full, no longer gives hunger signs, and stays asleep when placed on his back in the crib   If the baby is sleepy and wont wake for a feeding, put the baby skin-to-skin dressed in a diaper against the mothers bare chest   Sleep near your baby   The baby should be positioned and latched on to the breast correctly  o Chest-to-chest, chin in the breast  o Babys lips are flipped outward  o Babys mouth is stretched open wide like a shout  o Babys sucking should feel like tugging to the mother  - The baby should be drinking at the breast:  o You should hear swallowing or gulping throughout the feeding  o You should see milk on the babys lips when he comes off the breast  o Your breasts should be softer when the baby is finished feeding  o The baby should look relaxed at the end of feedings  o After  the 4th day and your milk is in:  o The babys poop should turn bright yellow and be loose, watery, and seedy  o The baby should have at least 3-4 poops the size of the palm of your hand per day  o The baby should have at least 6-8 wet diapers per day  o The urine should be light yellow in color  You should drink when you are thirsty and eat a healthy diet when you are    hungry.     Take naps to get the rest you need.   Take medications and/or drink alcohol only with permission of your obstetrician    or the babys pediatrician.  You can also call the [facility],   ([phone]), Monday-Friday, 8am-5pm Central time, to get the most   up-to-date evidence-based information on the use of medications during   pregnancy and breastfeeding.      The baby should be examined by a pediatrician at 3-5 days of age; unless ordered sooner by the pediatrician.  Once your milk comes in, the baby should be back to birth weight no later than 10-14 days of age.    Primary Engorgement    If the milk is flowing, use wet or dry heat applied to the breasts for approximately 10min prior to each feeding as a comfort measure to facilitate the milk ejection reflex    Follow heat treatment with breast massage to soften hard/lumpy areas of the breast    Use unrestricted, frequent, effective feedings.      Wake baby to feed if necessary    Avoid pacifier and bottle feedings    Hand express or pump breasts to the point of comfort prn    Use cold treatments in the form of ice packs/gel packs/ frozen vegetables wrapped in a soft thin cloth and applied to the breasts for approximately 20min after each feeding until engorgement is resolved    Wear comfortable, supportive bra    Take pain medicine prn    Use anti-inflammatory medications if prescribed by physician    Other:    Lactation Services - [phone]    Breast pumping for [facility]  Preparation and Hygiene:  1. Shower daily.  2. Wear a clean bra each day and wash daily in warm soapy  water.  3. Change wet or moist breast pads frequently.  Moist pads can promote growth of germs.  Actively wash your hands, paying close attention to the area around and under your fingernails, thoroughly with soap and water for 15 seconds before pumping or handling your milk.  Re-wash your hands if you touch anything (scratching your nose, answering the phone, etc) while pumping or handling your milk.   4. Before pumping your breasts, assemble the pump collection kit and have ready the sterile container and labels.  Place these items on a clean surface next to the breast pump.  5. Each time after you have finished pumping, take apart all of the parts of the breast pump collection kit and place them in a separate cleaning container (do not place them in the sink).  Be sure to remove the yellow valve from the breast shield and separate the white membrane from the yellow valve.  Rinse all of these parts with cool water.  Then use a new sponge and/or bottle brush and dishwashing detergent to clean the parts.  Rinse off the soapy water with cool water and air dry on a clean towel covered with a clean cloth.  All parts may also be washed after each use in the top rack of a .  6. Once each day, sanitize all of the parts of the breast pump collection kit.  This can be done by boiling the kit parts for 10 minutes or by using a Quick Clean Micro-Steam Bag made by [employer]  7. If condensation appears in the tubing, continue to run the pump with the tubing attached for 1-2 minutes or until the tubing is dry.   8. Notify your babys nurse or doctor if you become ill or need to take any medication, even over-the-counter medicines.  Collection and Storage of Expressed Breast milk:         1. Pump your breasts at least 8 or more times every 24 hours.  Double pump (both breasts at the same time) for at least 15-20 minutes using the most suction that is comfortable.    2. Write the date and time of pumping and the name  of any medications you are taking on the babys pre-printed hospital identification label.   3.    Do not touch the inside of the storage containers or lids.  4.        Tightly screw the lid onto the container and place immediately into the refrigerator for daily use.  5.    Expressed breast milk should be refrigerated or frozen within 4 hours of pumping.  6.        Do not store expressed breast milk on the door of your refrigerator or freezer             where the temperature is warmer.   7.        Refrigerated milk may be stored for up to 7 days.  At this point it can be moved to the freezer for 6 -12 months.  8.        Thaw frozen breast milk in overnight in the refrigerator.  Once milk is thawed it must be used within 24 hours.  9.        Refrigerated breast milk needs to be warmed to room temperature.  Warm by leaving unrefrigerated until it reached room temperature, or place sealed bottle into a cup of warm (not boiling) water or use a bottle warmer.               Never warm breast milk in a microwave or boiling water.    For any questions or concerns call the Lactation Department at [phone]        After a Cesearean Birth    General Discharge Instructions  · May follow a regular diet, unless otherwise discussed with physician.  · Take showers, not baths unless otherwise discussed with physician.  · Activity as tolerated.  · No lifting or heavy exercise for 6 weeks, no driving for 2 weeks, no sexual intercourse, douching or tampons for 6 weeks  · May return to work/school as discussed with physician  · Discuss birth control with physician  · Breast care support bra worn at all times  · Lactation consultant referral number ( [phone] or [phone])    Call Your Healthcare Provider Right Away If You Have:  · A fever of 101°F or higher.  · Incisional drainage  · Heavy vaginal bleeding, clots, or vaginal discharge with foul odor.  · Redness, discharge, or pain worse than you had in the  hospital.  · Burning, pain, red streaks, or lumpy areas in your breasts.  · Cracks, blisters, or blood on your nipples.  · Burning or pain when you urinate.  · Persistent nausea or vomiting.  · Severe headaches, blurred vision, dizziness or fainting.  · Feelings of extreme sadness or anxiety, or a feeling that you dont want to be with your baby.  · No bowel movement for 5 days.  · Redness, warmth, swelling, or pain in the lower leg.    Incision Care  · You will be able to shower and pat the incision dry.  · Watch your incision for signs of infection, such as increasing redness or drainage.  · For ease of movement, hold a pillow against the incision when you get up from a lying or sitting position, and when you laugh or cough.  · Avoid heavy lifting--nothing heavier than your baby until your doctor instructs you otherwise.   Follow-Up  Schedule a  follow-up exam with your healthcare provider for about 6 weeks after delivery. During this exam, your uterus and vaginal area will be checked. Contact your healthcare provider if you think you or your baby are having any problems.

## 2018-01-20 NOTE — PLAN OF CARE
Problem: Patient Care Overview  Goal: Plan of Care Review  Outcome: Ongoing (interventions implemented as appropriate)  Plan of care reviewed. Fundus firm. Pumping. Ambulating to NICU to bond with infant. Flexeril helping neck pain. Frequent checks made to ensure safety and comfort. Bed low, call bell within reach. Will continue to monitor.

## 2018-01-20 NOTE — PROGRESS NOTES
Delivery Progress Note  Obstetrics        SUBJECTIVE:     Postpartum Day 4:  Delivery    Ms. Myers states she feels well. She denies emotional concerns. Her pain is well controlled with current medications. The baby is in the NICU. The patient is ambulating well. Ms. Myers is tolerating a normal diet. Flatus has been passed. Urinary output is adequate.    OBJECTIVE:     Vital Signs (Most Recent):  Temp: 98.3 °F (36.8 °C) (18 0802)  Pulse: 67 (18 0802)  Resp: 18 (18 0802)  BP: (!) 140/64 (18 0802)  SpO2: 100 % (18 1101)    Vital Signs Range (Last 24H):  Temp:  [98.2 °F (36.8 °C)-98.6 °F (37 °C)]   Pulse:  [67-85]   Resp:  [18-20]   BP: (109-140)/(55-76)     I & O (Last 24H):No intake or output data in the 24 hours ending 18 1221  Physical Exam:  General:    no distress   Lungs:  clear to auscultation bilaterally   Heart:  regular rate and rhythm, S1, S2 normal, no murmur, click, rub or gallop   Breasts:  no discharge, erythema, or tenderness   Abdomen:  soft, non-tender; bowel sounds normal   Fundus:  firm   Incision:  healing well   Lochia:   scant rubra   DVT Evaluation:  No evidence of DVT on either side seen on physical exam.     Hemoglobin/Hematocrit    Recent Labs  Lab 18  0706   HGB 11.4*   HCT 33.9*     ABO/Rh  Lab Results   Component Value Date    GROUPTRH O POS 2018     Rubella  No results for input(s): RUBELLAIGGSC in the last 168 hours.    ASSESSMENT/PLAN:     Status post  section. Doing well postoperatively.     Continue current care.  Discharge to home.      Raheem Howell MD

## 2018-01-20 NOTE — PLAN OF CARE
Problem: Breastfeeding (Adult,Obstetrics,Pediatric)  Goal: Signs and Symptoms of Listed Potential Problems Will be Absent, Minimized or Managed (Breastfeeding)  Signs and symptoms of listed potential problems will be absent, minimized or managed by discharge/transition of care (reference Breastfeeding (Adult,Obstetrics,Pediatric) CPG).   Outcome: Ongoing (interventions implemented as appropriate)  Pumping EBM jonah infant in NICU. Encouraged to pump 8 or rmore times in 24.

## 2018-01-20 NOTE — LACTATION NOTE
01/20/18 1200   Maternal Infant Assessment   Breast Density Bilateral:;soft;filling   Areola Bilateral:;elastic   Nipple(s) Bilateral:;everted   Breasts WDL   Breasts WDL WDL       Number Scale   Presence of Pain denies   Location - Side Bilateral   Location breast   Maternal Infant Feeding   Maternal Emotional State relaxed;independent   Time Spent (min) 0-15 min   Equipment Type/Education   Pump Type Symphony   Breast Pump Type double electric, hospital grade   Breast Pump Flange Type hard   Breast Pump Flange Size 24 mm   Pumping Frequency (times) 8 # of times pumped   Lactation Referrals   Lactation Consult Pump teaching;Knowledge deficit;Follow up   Lactation Interventions   Maternal Breastfeeding Support encouragement offered;lactation counseling provided     Pumping well 8 - 12 times in 24 hours with Symphony pump.  Appropriate labeling and storage of EBM noted.  Encouraged to call for assist prn.  Pump parts sanitized with [employer] microsteam bag.

## 2018-01-20 NOTE — PROGRESS NOTES
Up to shower then plans to pump. Patient states feels better after getting sleep. Given scheduled medication. Instructed to call with needs. Understanding voiced. LATISHA.

## 2018-01-20 NOTE — PLAN OF CARE
Problem: Patient Care Overview  Goal: Individualization & Mutuality  Hospital Problem List   Date Reviewed: 2018        Codes Priority Class Noted POA  Prenatal care in third trimester ICD-10-CM: Z34.93  ICD-9-CM: V22.1   2018 Not Applicable    Non-Hospital Problem List   Date Reviewed: 2018        Codes Priority Class Noted  Incompetent cervix ICD-10-CM: N88.3  ICD-9-CM: 622.5   2017  History of classical  section ICD-10-CM: Z98.891  ICD-9-CM: V45.89   2017  W[place]ron cerclage present, antepartum ICD-10-CM: O34.30  ICD-9-CM: 654.53   2017  History of  delivery, currently pregnant in second trimester ICD-10-CM: O09.212  ICD-9-CM: V23.41   Unknown  Chronic hypertension in obstetric context in second trimester ICD-10-CM: O10.912  ICD-9-CM: 642.03   Unknown  Pregnancy with one fetus ICD-10-CM: Z34.90  ICD-9-CM: V22.1   1/15/2018         Outcome: Ongoing (interventions implemented as appropriate)  VSS. Afebrile. Ambulating in room and voiding without difficulty. Continues to have back neck pain but able to get good relief with hot/cold treatment and PRN Flexeril. Continues to receive scheduled Motrin. Pumping without difficulty and continued encouragement to pump 8 or more in 24 hours. Passing flatus and has had a bowel movement. POC discussed and understanding voiced. MARINA

## 2018-01-21 NOTE — DISCHARGE SUMMARY
DATE OF ADMISSION:  2018    ADMIT DIAGNOSES:  1.  Intrauterine pregnancy at 36 weeks' gestation.  2.  Cerclage in place.  3.  Chronic hypertension.  4.  Obesity.  5.  Classical  section.    DISCHARGE DIAGNOSES:  1.  Intrauterine pregnancy at 36 weeks' gestation.  2.  Cerclage in place.  3.  Chronic hypertension.  4.  Obesity.  5.  Classical  section.    OPERATIVE PROCEDURE:  Repeat low-transverse  section via Pfannenstiel   and cerclage removal.    HOSPITAL COURSE:  Ms. Myers is a 27-year-old  2, para 0-2-0-2 at 36 weeks   gestation, who was admitted for cerclage removal and repeat  section.    Her surgery was uncomplicated and by postoperative day #4, the patient was   ambulating, voiding, tolerating a regular diet and was ready for discharge to   home.  She was discharged home with a prescription for Percocet and Motrin.  She   was instructed to continue her ferrous sulfate and to follow up with Dr. Enriquez   in 1 week.  Her infant was admitted to the  Intensive Care Unit.  She   was advised to have a normal diet, normal activity, but to have pelvic rest for   6 weeks and avoid heavy lifting.      JOSE MARTIN/HN  dd: 2018 12:28:27 (CST)  td: 2018 00:56:11 (CST)  Doc ID   #[id number]  Job ID #[id number]    CC:

## 2018-01-24 ENCOUNTER — TELEPHONE (OUTPATIENT)
Dept: OBSTETRICS AND GYNECOLOGY | Facility: HOSPITAL | Age: 28
End: 2018-01-24

## 2018-01-24 NOTE — TELEPHONE ENCOUNTER
"Lactation Telephone Follow up:  Spoke with pt.  Reports baby breastfeeds well 2x per day, other feedings are EBM and formula.  Continues to pump with hand pump, scheduled to get a [facility] pump tomorrow.  Pumps 40 ml per pumping.  Discussed the need to continue to empty breasts at least 8 - 10 times in 24 hours to maintain milk supply.   Encouraged to increase breastfeeding, skin to skin and to pump with double electric pump as soon as possible to  Increase supply.  Reports a wet and brown/yellow seedy stool with each feeding.  Denies any c/o or concerns.  Hotline # given.  States "understand" and verbalized appropriate recall.  "

## 2018-07-17 ENCOUNTER — HOSPITAL ENCOUNTER (EMERGENCY)
Facility: HOSPITAL | Age: 28
Discharge: HOME | End: 2018-07-18
Attending: EMERGENCY MEDICINE
Payer: COMMERCIAL

## 2018-07-17 VITALS
TEMPERATURE: 98 F | OXYGEN SATURATION: 100 % | WEIGHT: 247 LBS | RESPIRATION RATE: 18 BRPM | HEIGHT: 66 IN | SYSTOLIC BLOOD PRESSURE: 120 MMHG | HEART RATE: 75 BPM | BODY MASS INDEX: 39.7 KG/M2 | DIASTOLIC BLOOD PRESSURE: 72 MMHG

## 2018-07-17 DIAGNOSIS — M54.50 CHRONIC BILATERAL LOW BACK PAIN WITHOUT SCIATICA: ICD-10-CM

## 2018-07-17 DIAGNOSIS — G89.29 CHRONIC BILATERAL LOW BACK PAIN WITHOUT SCIATICA: ICD-10-CM

## 2018-07-17 DIAGNOSIS — M54.9 UPPER BACK PAIN, CHRONIC: Primary | ICD-10-CM

## 2018-07-17 DIAGNOSIS — G89.29 UPPER BACK PAIN, CHRONIC: Primary | ICD-10-CM

## 2018-07-17 PROCEDURE — 81025 URINE PREGNANCY TEST: CPT | Performed by: EMERGENCY MEDICINE

## 2018-07-17 PROCEDURE — 99283 EMERGENCY DEPT VISIT LOW MDM: CPT

## 2018-07-18 LAB
B-HCG UR QL: NEGATIVE
CTP QC/QA: YES

## 2018-07-18 RX ORDER — METHOCARBAMOL 750 MG/1
1500 TABLET, FILM COATED ORAL EVERY 6 HOURS
Qty: 24 TABLET | Refills: 0 | Status: SHIPPED | OUTPATIENT
Start: 2018-07-18 | End: 2018-07-21

## 2018-07-18 RX ORDER — METHOCARBAMOL 750 MG/1
1500 TABLET, FILM COATED ORAL
Status: DISCONTINUED | OUTPATIENT
Start: 2018-07-18 | End: 2018-07-18 | Stop reason: HOSPADM

## 2018-07-18 NOTE — ED PROVIDER NOTES
Encounter Date: 2018    SCRIBE #1 NOTE: I, Fredy Alexander, am scribing for, and in the presence of,  Dr. Calderón. I have scribed the entire note.       History     Chief Complaint   Patient presents with    Back Pain     pt states that she has had intermittent back pain since epidural 5 months ago. Reports takin muscle relaxer and motrin for the pain but it has not helped     Time patient was seen by the provider: 11:18 PM      The patient is a 28 y.o. female with multiple co-morbidities including: HTN who presents to the ED with a complaint of intermittent back pain since she gave birth five months ago (uncomplicated  at 36 weeks). She attributes the onset of her back pain to receiving the epidural at that time. Patient took ibuprofen and Flexeril PTA with minimal relief.  She describes her pain as a tingling along her back. She denies recent trauma or strenuous activity. Patient notes that sitting up worsens her pain.  She denies any gait disturbance, incontinence, saddle anesthesia, paresthesia, fever, dysuria, hematuria, SOB, or chest pain .          The history is provided by the patient.     Review of patient's allergies indicates:   Allergen Reactions    Latex, natural rubber Hives     Patient states last incident was a couple years ago and needed to go to ER for hives breakout.     Amoxil [amoxicillin]     Orange juice Hives    Toradol [ketorolac] Nausea And Vomiting     Past Medical History:   Diagnosis Date    Chlamydia     Chronic hypertension in obstetric context in second trimester     Flu     High-risk pregnancy     UTI (lower urinary tract infection)     Yeast infection      Past Surgical History:   Procedure Laterality Date     SECTION      HERNIA REPAIR       Family History   Problem Relation Age of Onset    No Known Problems Mother     No Known Problems Father     Diabetes Other     Hypertension Other      Social History   Substance Use Topics    Smoking  status: Former Smoker    Smokeless tobacco: Never Used    Alcohol use No     Review of Systems   Constitutional: Negative for fever.   Respiratory: Negative for shortness of breath.    Cardiovascular: Negative for chest pain.   Gastrointestinal: Negative for abdominal pain and vomiting.   Genitourinary: Negative for difficulty urinating, dysuria, frequency and hematuria.   Musculoskeletal: Positive for back pain. Negative for gait problem and joint swelling.   Neurological: Negative for weakness, numbness and headaches.   All other systems reviewed and are negative.      Physical Exam     Initial Vitals [07/17/18 2205]   BP Pulse Resp Temp SpO2   120/72 75 20 98.4 °F (36.9 °C) 100 %      MAP       --         Physical Exam    Nursing note and vitals reviewed.  Constitutional: She appears well-developed and well-nourished. She is not diaphoretic. No distress.   HENT:   Head: Normocephalic and atraumatic.   Nose: Nose normal.   Eyes: Conjunctivae are normal.   Neck: Normal range of motion. Neck supple.   Cardiovascular: Normal rate and regular rhythm.   Pulmonary/Chest: Breath sounds normal. No respiratory distress.   Abdominal: She exhibits no distension.   Musculoskeletal: Normal range of motion. She exhibits no edema or tenderness.   Neurological: She is alert and oriented to person, place, and time. She has normal strength. Gait normal.   Skin: Skin is warm and dry.   Psychiatric: She has a normal mood and affect. Her behavior is normal.         ED Course   Procedures  Labs Reviewed   POCT URINE PREGNANCY          Imaging Results    None                     Scribe Attestation:   Scribe #1: I performed the above scribed service and the documentation accurately describes the services I performed. I attest to the accuracy of the note.          Labs Reviewed  Admission on 07/17/2018, Discharged on 07/18/2018   Component Date Value Ref Range Status    POC Preg Test, Ur 07/18/2018 Negative  Negative Final    Quality  Control Acceptable 07/18/2018 Yes   Final        Imaging Reviewed    Imaging Results    None         Medications given in ED    Medications - No data to display    This document was produced by a scribe under my direction and in my presence. I agree with the content of the note and have made any necessary edits.     Rachel Calderón MD         Note was created using voice recognition software. Note may have occasional typographical errors that may not have been identified and edited despite good eboni initial review prior to signing.       ED Course as of Aug 03 0330   Wed Jul 18, 2018   0025 Refused Robaxin stating she already has this at home. Requesting discharge papers.   [DL]      ED Course User Index  [DL] Rachel Calderón MD     Discharge Medications     Discharge Medication List as of 7/18/2018 12:27 AM      START taking these medications    Details   methocarbamol (ROBAXIN) 750 MG Tab Take 2 tablets (1,500 mg total) by mouth every 6 (six) hours. for 3 days, Starting Wed 7/18/2018, Until Sat 7/21/2018, Normal         CONTINUE these medications which have NOT CHANGED    Details   ibuprofen (ADVIL,MOTRIN) 600 MG tablet Take 1 tablet (600 mg total) by mouth every 6 (six) hours., Starting Sat 1/20/2018, Normal      norethindrone-ethinyl estradiol (MICROGESTIN 1/20) 1-20 mg-mcg per tablet Take 1 tablet by mouth once daily., Starting Wed 4/11/2018, Until Thu 4/11/2019, Normal      oxyCODONE-acetaminophen (PERCOCET) 5-325 mg per tablet Take 1 tablet by mouth every 4 (four) hours as needed., Starting Thu 2/1/2018, Print      phentermine (ADIPEX-P) 37.5 mg tablet Take 1 tablet (37.5 mg total) by mouth before breakfast., Starting Wed 4/11/2018, No Print      topiramate (TOPAMAX) 25 MG tablet Take 1 tablet (25 mg total) by mouth 2 (two) times daily., Starting Tue 7/3/2018, Until Wed 7/3/2019, Normal                   Patient discharged to home in stable condition with instructions to:   1. Please take all meds as  prescribed.  2. Follow-up with your primary care doctor   3. Return precautions discussed and patient and/or family/caretaker understands to return to the emergency room for any concerns including worsening of your current symptoms, fever, chills, night sweats, worsening pain, chest pain, shortness of breath, nausea, vomiting, diarrhea, bleeding, headache, difficulty talking, visual disturbances, weakness, numbness or any other acute concerns    Clinical Impression:     1. Upper back pain, chronic    2. Chronic bilateral low back pain without sciatica                                   Rachel Calderón MD  08/09/18 7502

## 2018-08-06 DIAGNOSIS — M54.50 LUMBAR SPINE PAIN: Primary | ICD-10-CM

## 2018-08-07 ENCOUNTER — OFFICE VISIT (OUTPATIENT)
Dept: SPINE | Facility: CLINIC | Age: 28
End: 2018-08-07
Payer: COMMERCIAL

## 2018-08-07 ENCOUNTER — HOSPITAL ENCOUNTER (OUTPATIENT)
Dept: RADIOLOGY | Facility: HOSPITAL | Age: 28
Discharge: HOME | End: 2018-08-07
Attending: PHYSICIAN ASSISTANT
Payer: COMMERCIAL

## 2018-08-07 VITALS — BODY MASS INDEX: 39.69 KG/M2 | HEIGHT: 66 IN | WEIGHT: 246.94 LBS

## 2018-08-07 DIAGNOSIS — M54.50 LUMBAR SPINE PAIN: ICD-10-CM

## 2018-08-07 DIAGNOSIS — M54.6 THORACIC BACK PAIN, UNSPECIFIED BACK PAIN LATERALITY, UNSPECIFIED CHRONICITY: Primary | ICD-10-CM

## 2018-08-07 PROCEDURE — 72120 X-RAY BEND ONLY L-S SPINE: CPT | Mod: 26,,, | Performed by: RADIOLOGY

## 2018-08-07 PROCEDURE — 99204 OFFICE O/P NEW MOD 45 MIN: CPT | Mod: S$GLB,,, | Performed by: PHYSICIAN ASSISTANT

## 2018-08-07 PROCEDURE — 72120 X-RAY BEND ONLY L-S SPINE: CPT | Mod: TC

## 2018-08-07 PROCEDURE — 72100 X-RAY EXAM L-S SPINE 2/3 VWS: CPT | Mod: 26,,, | Performed by: RADIOLOGY

## 2018-08-07 PROCEDURE — 99999 PR PBB SHADOW E&M-EST. PATIENT-LVL III: CPT | Mod: PBBFAC,,, | Performed by: PHYSICIAN ASSISTANT

## 2018-08-07 PROCEDURE — 3008F BODY MASS INDEX DOCD: CPT | Mod: CPTII,S$GLB,, | Performed by: PHYSICIAN ASSISTANT

## 2018-08-07 RX ORDER — METHYLPREDNISOLONE 4 MG/1
TABLET ORAL
Qty: 1 PACKAGE | Refills: 0 | Status: SHIPPED | OUTPATIENT
Start: 2018-08-07 | End: 2018-09-06

## 2018-08-07 NOTE — PROGRESS NOTES
DATE: 2018  PATIENT: Catina Myers    Supervising Physician: Asaf Kamara M.D.    CHIEF COMPLAINT: right periscapular pain    HISTORY:  Catina Myers is a 28 y.o. female [employer] employee here for initial evaluation of right periscapular pain (Neck - 0, Periscapular - 10). The pain has been present since she had her baby in January.  She says they gave her an epidural but they told her they gave her too much medication and she couldn't move her neck.  She can now move her neck, but she has pain over the right scapula.  The patient describes the pain as aching and sharp. The pain is worse with sitting up straight and activity and improved by lying down. There is no associated numbness and tingling. There is no subjective weakness. Prior treatments have included ibuprofen, flexeril and tylenol, but no physical therapy, ESIs or surgery.     The patient denies myelopathic symptoms such as handwriting changes or difficulty with buttons/coins/keys. Denies perineal paresthesias, bowel/bladder dysfunction.    PAST MEDICAL/SURGICAL HISTORY:  Past Medical History:   Diagnosis Date    Chlamydia     Chronic hypertension in obstetric context in second trimester     Flu     High-risk pregnancy     UTI (lower urinary tract infection)     Yeast infection      Past Surgical History:   Procedure Laterality Date     SECTION      HERNIA REPAIR         Medications:  Current Outpatient Prescriptions on File Prior to Visit   Medication Sig Dispense Refill    ibuprofen (ADVIL,MOTRIN) 600 MG tablet Take 1 tablet (600 mg total) by mouth every 6 (six) hours. 40 tablet 0    norethindrone-ethinyl estradiol (MICROGESTIN ) 1-20 mg-mcg per tablet Take 1 tablet by mouth once daily. 30 tablet 11    oxyCODONE-acetaminophen (PERCOCET) 5-325 mg per tablet Take 1 tablet by mouth every 4 (four) hours as needed. 20 tablet 0    phentermine (ADIPEX-P) 37.5 mg tablet Take 1 tablet (37.5 mg total) by mouth before breakfast. 30  "tablet 0    topiramate (TOPAMAX) 25 MG tablet Take 1 tablet (25 mg total) by mouth 2 (two) times daily. 60 tablet 11     No current facility-administered medications on file prior to visit.        Social History:   Social History     Social History    Marital status: Single     Spouse name: N/A    Number of children: N/A    Years of education: N/A     Occupational History    Not on file.     Social History Main Topics    Smoking status: Former Smoker    Smokeless tobacco: Never Used    Alcohol use No    Drug use: No    Sexual activity: Yes     Partners: Male     Birth control/ protection: None     Other Topics Concern    Not on file     Social History Narrative    No narrative on file       REVIEW OF SYSTEMS:  Constitution: Negative. Negative for chills, fever and night sweats.   Cardiovascular: Negative for chest pain and syncope.   Respiratory: Negative for cough and shortness of breath.   Gastrointestinal: See HPI. Negative for nausea/vomiting. Negative for abdominal pain.  Genitourinary: See HPI. Negative for discoloration or dysuria.  Skin: Negative for dry skin, itching and rash.   Hematologic/Lymphatic: Negative for bleeding problem. Does not bruise/bleed easily.   Musculoskeletal: Negative for falls and muscle weakness.   Neurological: See HPI. No seizures.   Endocrine: Negative for polydipsia, polyphagia and polyuria.   Allergic/Immunologic: Negative for hives and persistent infections.  Psychiatric/Behavioral: Negative for depression and insomnia.         EXAM:  Ht 5' 6" (1.676 m)   Wt 112 kg (246 lb 14.6 oz)   Breastfeeding? No   BMI 39.85 kg/m²     General: The patient is a pleasant 28 y.o. female in no apparent distress, the patient is oriented to person, place and time.  Psych: Normal mood and affect  HEENT: Vision grossly intact, hearing intact to the spoken word.  Lungs: Respirations unlabored.  Gait: Normal station and gait, no difficulty with toe or heel walk.   Skin: Cervical skin " negative for rashes, lesions, hairy patches and surgical scars.  Range of motion: Cervical range of motion is acceptable. There is mild right periscapular tenderness to palpation.  Spinal Balance: Global saggital and coronal spinal balance acceptable, no significant for scoliosis and kyphosis.  Musculoskeletal: No pain with the range of motion of the bilateral shoulders and elbows. Normal bulk and contour of the bilateral hands.  Vascular: Bilateral hands warm and well perfused, radial pulses 2+ bilaterally.  Neurological: Normal strength and tone in all major motor groups in the bilateral upper and lower extremities. Normal sensation to light touch in the C5-T1 and L2-S1 dermatomes bilaterally.  Deep tendon reflexes symmetric 2+ in the bilateral upper and lower extremities.  Negative Inverted Radial Reflex and Rausch's bilaterally. Negative Babinski bilaterally.     IMAGING:   Today I personally reviewed AP, Lat and Flex/Ex  upright L-spine films that demonstrate normal disc spacing and alignment.  No acute abnormalities.       Body mass index is 39.85 kg/m².    No results found for: HGBA1C        ASSESSMENT/PLAN:    Diagnoses and all orders for this visit:    Thoracic back pain, unspecified back pain laterality, unspecified chronicity  -     Ambulatory Consult to [facility]    Other orders  -     methylPREDNISolone (MEDROL DOSEPACK) 4 mg tablet; use as directed    Medrol dose pack sent to the pharmacy.  Referral for PT placed today.  She will follow up in 2-3 weeks if her symptoms persist.  We will consider an MRI at that time.     We discussed the department policy regarding pain medications.  Patient understands and agrees.       Follow-up if symptoms worsen or fail to improve.

## 2018-08-15 ENCOUNTER — TELEPHONE (OUTPATIENT)
Dept: ORTHOPEDICS | Facility: CLINIC | Age: 28
End: 2018-08-15

## 2018-08-15 NOTE — TELEPHONE ENCOUNTER
----- Message from Kamran Butt sent at 8/15/2018 10:05 AM CDT -----  Contact: self  Patient states she is still having back pain want to know the next steps. Patient ask for a call at

## 2018-08-15 NOTE — TELEPHONE ENCOUNTER
Patient will call back if she still in pain once she starts PT 08/29/2018. Patient verbally understand.

## 2018-09-06 ENCOUNTER — CLINICAL SUPPORT (OUTPATIENT)
Dept: REHABILITATION | Facility: OTHER | Age: 28
End: 2018-09-06
Attending: PHYSICIAN ASSISTANT
Payer: COMMERCIAL

## 2018-09-06 ENCOUNTER — CLINICAL SUPPORT (OUTPATIENT)
Dept: REHABILITATION | Facility: OTHER | Age: 28
End: 2018-09-06
Attending: PHYSICAL MEDICINE & REHABILITATION
Payer: COMMERCIAL

## 2018-09-06 VITALS
WEIGHT: 264.19 LBS | DIASTOLIC BLOOD PRESSURE: 83 MMHG | BODY MASS INDEX: 42.46 KG/M2 | HEART RATE: 82 BPM | HEIGHT: 66 IN | SYSTOLIC BLOOD PRESSURE: 119 MMHG

## 2018-09-06 DIAGNOSIS — M54.50 CHRONIC MIDLINE LOW BACK PAIN WITHOUT SCIATICA: Primary | ICD-10-CM

## 2018-09-06 DIAGNOSIS — G89.29 CHRONIC MIDLINE LOW BACK PAIN WITHOUT SCIATICA: Primary | ICD-10-CM
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
## 2019-02-28 ENCOUNTER — OFFICE VISIT (OUTPATIENT)
Dept: URGENT CARE | Facility: CLINIC | Age: 29
End: 2019-02-28
Payer: MEDICAID

## 2019-02-28 VITALS
WEIGHT: 264 LBS | RESPIRATION RATE: 20 BRPM | OXYGEN SATURATION: 100 % | SYSTOLIC BLOOD PRESSURE: 143 MMHG | BODY MASS INDEX: 42.43 KG/M2 | DIASTOLIC BLOOD PRESSURE: 89 MMHG | HEIGHT: 66 IN | TEMPERATURE: 99 F | HEART RATE: 93 BPM

## 2019-02-28 DIAGNOSIS — J02.9 SORE THROAT: ICD-10-CM

## 2019-02-28 DIAGNOSIS — J02.9 PHARYNGITIS, UNSPECIFIED ETIOLOGY: ICD-10-CM

## 2019-02-28 DIAGNOSIS — R50.9 FEVER, UNSPECIFIED FEVER CAUSE: ICD-10-CM

## 2019-02-28 DIAGNOSIS — J06.9 UPPER RESPIRATORY INFECTION, ACUTE: Primary | ICD-10-CM

## 2019-02-28 LAB
CTP QC/QA: YES
CTP QC/QA: YES
FLUAV AG NPH QL: NEGATIVE
FLUBV AG NPH QL: NEGATIVE
S PYO RRNA THROAT QL PROBE: NEGATIVE

## 2019-02-28 PROCEDURE — 87804 POCT INFLUENZA A/B: ICD-10-PCS | Mod: QW,S$GLB,, | Performed by: NURSE PRACTITIONER

## 2019-02-28 PROCEDURE — 99214 OFFICE O/P EST MOD 30 MIN: CPT | Mod: 25,S$GLB,, | Performed by: NURSE PRACTITIONER

## 2019-02-28 PROCEDURE — 87880 STREP A ASSAY W/OPTIC: CPT | Mod: QW,S$GLB,, | Performed by: NURSE PRACTITIONER

## 2019-02-28 PROCEDURE — 87880 POCT RAPID STREP A: ICD-10-PCS | Mod: QW,S$GLB,, | Performed by: NURSE PRACTITIONER

## 2019-02-28 PROCEDURE — 87804 INFLUENZA ASSAY W/OPTIC: CPT | Mod: QW,S$GLB,, | Performed by: NURSE PRACTITIONER

## 2019-02-28 PROCEDURE — 99214 PR OFFICE/OUTPT VISIT, EST, LEVL IV, 30-39 MIN: ICD-10-PCS | Mod: 25,S$GLB,, | Performed by: NURSE PRACTITIONER

## 2019-02-28 RX ORDER — DEXAMETHASONE SODIUM PHOSPHATE 100 MG/10ML
8 INJECTION INTRAMUSCULAR; INTRAVENOUS
Status: COMPLETED | OUTPATIENT
Start: 2019-02-28 | End: 2019-02-28

## 2019-02-28 RX ADMIN — DEXAMETHASONE SODIUM PHOSPHATE 8 MG: 100 INJECTION INTRAMUSCULAR; INTRAVENOUS at 04:02

## 2019-02-28 NOTE — LETTER
February 28, 2019      [facility] [address], [place] [address]  Phone: [phone]  Fax: [phone]       Patient: Catina Myers   YOB: 1990  Date of Visit: 02/28/2019    To Whom It May Concern:    Mar Myers  was at [facility] on 02/28/2019. She may return to work/school on 3/2/19 with no restrictions. If you have any questions or concerns, or if I can be of further assistance, please do not hesitate to contact me.    Sincerely,    Pascale Monge NP

## 2019-02-28 NOTE — PATIENT INSTRUCTIONS
Use an antihistamine such as Claritin, Zyrtec or Allegra to dry you out.     Use Flonase 1-2 sprays/nostril per day. It is a local acting steroid nasal spray, if you develop a bloody nose, stop using the medication immediately.    Use warm salt water gargles to ease your throat pain. Warm salt water gargles as needed for sore throat-  1/2 tsp salt to 1 cup warm water, gargle as desired. Hot tea with honey.    Sometimes Nyquil at night is beneficial to help you get some rest, however it is sedating and it does have an antihistamine, and tylenol.    Tylenol or Motrin as needed for body aches and chills.      Viral Upper Respiratory Illness (Adult)  You have a viral upper respiratory illness (URI), which is another term for the common cold. This illness is contagious during the first few days. It is spread through the air by coughing and sneezing. It may also be spread by direct contact (touching the sick person and then touching your own eyes, nose, or mouth). Frequent handwashing will decrease risk of spread. Most viral illnesses go away within 7 to 10 days with rest and simple home remedies. Sometimes the illness may last for several weeks. Antibiotics will not kill a virus, and they are generally not prescribed for this condition.    Home care  · If symptoms are severe, rest at home for the first 2 to 3 days. When you resume activity, don't let yourself get too tired.  · Avoid being exposed to cigarette smoke (yours or others).  · You may use acetaminophen or ibuprofen to control pain and fever, unless another medicine was prescribed. (Note: If you have chronic liver or kidney disease, have ever had a stomach ulcer or gastrointestinal bleeding, or are taking blood-thinning medicines, talk with your healthcare provider before using these medicines.) Aspirin should never be given to anyone under 18 years of age who is ill with a viral infection or fever. It may cause severe liver or brain damage.  · Your appetite  may be poor, so a light diet is fine. Avoid dehydration by drinking 6 to 8 glasses of fluids per day (water, soft drinks, juices, tea, or soup). Extra fluids will help loosen secretions in the nose and lungs.  · Over-the-counter cold medicines will not shorten the length of time youre sick, but they may be helpful for the following symptoms: cough, sore throat, and nasal and sinus congestion. (Note: Do not use decongestants if you have high blood pressure.)  Follow-up care  Follow up with your healthcare provider, or as advised.  When to seek medical advice  Call your healthcare provider right away if any of these occur:  · Cough with lots of colored sputum (mucus)  · Severe headache; face, neck, or ear pain  · Difficulty swallowing due to throat pain  · Fever of 100.4°F (38°C)  Call 911, or get immediate medical care  Call emergency services right away if any of these occur:  · Chest pain, shortness of breath, wheezing, or difficulty breathing  · Coughing up blood  · Inability to swallow due to throat pain  Date Last Reviewed: 9/13/2015  © 3900-8042 [employer]. [address] [place] [address]. All rights reserved. This information is not intended as a substitute for professional medical care. Always follow your healthcare professional's instructions.

## 2019-04-08 ENCOUNTER — HOSPITAL ENCOUNTER (EMERGENCY)
Facility: HOSPITAL | Age: 29
Discharge: HOME | End: 2019-04-08
Attending: EMERGENCY MEDICINE
Payer: MEDICAID

## 2019-04-08 VITALS
BODY MASS INDEX: 45.24 KG/M2 | RESPIRATION RATE: 16 BRPM | TEMPERATURE: 97 F | SYSTOLIC BLOOD PRESSURE: 136 MMHG | WEIGHT: 265 LBS | DIASTOLIC BLOOD PRESSURE: 64 MMHG | HEIGHT: 64 IN | OXYGEN SATURATION: 100 % | HEART RATE: 90 BPM

## 2019-04-08 DIAGNOSIS — N76.0 ACUTE VAGINITIS: Primary | ICD-10-CM

## 2019-04-08 LAB
B-HCG UR QL: NEGATIVE
BACTERIA GENITAL QL WET PREP: ABNORMAL
CLUE CELLS VAG QL WET PREP: ABNORMAL
CTP QC/QA: YES
FILAMENT FUNGI VAG WET PREP-#/AREA: ABNORMAL
SPECIMEN SOURCE: ABNORMAL
T VAGINALIS GENITAL QL WET PREP: ABNORMAL
WBC #/AREA VAG WET PREP: ABNORMAL
YEAST GENITAL QL WET PREP: ABNORMAL

## 2019-04-08 PROCEDURE — 87491 CHLMYD TRACH DNA AMP PROBE: CPT

## 2019-04-08 PROCEDURE — 87210 SMEAR WET MOUNT SALINE/INK: CPT

## 2019-04-08 PROCEDURE — 81025 URINE PREGNANCY TEST: CPT | Performed by: EMERGENCY MEDICINE

## 2019-04-08 PROCEDURE — 99284 PR EMERGENCY DEPT VISIT,LEVEL IV: ICD-10-PCS | Mod: ,,, | Performed by: EMERGENCY MEDICINE

## 2019-04-08 PROCEDURE — 99284 EMERGENCY DEPT VISIT MOD MDM: CPT | Mod: ,,, | Performed by: EMERGENCY MEDICINE

## 2019-04-08 PROCEDURE — 99284 EMERGENCY DEPT VISIT MOD MDM: CPT

## 2019-04-08 RX ORDER — FLUCONAZOLE 200 MG/1
200 TABLET ORAL DAILY
Qty: 1 TABLET | Refills: 0 | Status: SHIPPED | OUTPATIENT
Start: 2019-04-08 | End: 2019-04-09

## 2019-04-08 RX ORDER — FLUCONAZOLE 200 MG/1
200 TABLET ORAL DAILY
Qty: 1 TABLET | Refills: 0 | Status: SHIPPED | OUTPATIENT
Start: 2019-04-08 | End: 2019-04-08 | Stop reason: SDUPTHER

## 2019-04-08 RX ORDER — METRONIDAZOLE 500 MG/1
500 TABLET ORAL 3 TIMES DAILY
Qty: 21 TABLET | Refills: 0 | Status: SHIPPED | OUTPATIENT
Start: 2019-04-08 | End: 2019-04-15

## 2019-04-08 RX ORDER — PHENTERMINE HYDROCHLORIDE 37.5 MG/1
37.5 CAPSULE ORAL EVERY MORNING
COMMUNITY

## 2019-04-08 NOTE — ED TRIAGE NOTES
Reporting yellow vaginal discharge after using lavender toilet paper 3 days ago.  Has tried monostat with no relief.

## 2019-04-08 NOTE — ED PROVIDER NOTES
Encounter Date: 2019    SCRIBE #1 NOTE: I, César Murphy, am scribing for, and in the presence of,  Bill Walker MD. I have scribed the following portions of the note - Other sections scribed: HPI, ROS, PE, MDM, Dispo.       History     Chief Complaint   Patient presents with    Vaginal Itching     used lavender toilet paper     The patient is a 28 y.o. female with co-morbidities including hx of yeast infection, chlamydia, UTI, HTN, and high-risk pregnancy who presents to the ED with a complaint of vaginal itching today. Pt reports she has unknowingly been using lavender toilet paper. She endorses to vaginal itching, burning, and discharge; however, noting that her discharge is not white. No further complaints at this time.        The history is provided by the patient.     Review of patient's allergies indicates:   Allergen Reactions    Latex, natural rubber Hives     Patient states last incident was a couple years ago and needed to go to ER for hives breakout.     Amoxil [amoxicillin]     Orange juice Hives    Toradol [ketorolac] Nausea And Vomiting     Past Medical History:   Diagnosis Date    Chlamydia     Chronic hypertension in obstetric context in second trimester     Flu     High-risk pregnancy     UTI (lower urinary tract infection)     Yeast infection      Past Surgical History:   Procedure Laterality Date     SECTION      DELIVERY- SECTION N/A 2018    [facility]    ENCERCLAGE N/A 2017    Performed by Lynn [facility]iddiqi [facility] at [facility]D    HERNIA REPAIR       Family History   Problem Relation Age of Onset    No Known Problems Mother     No Known Problems Father     Diabetes Other     Hypertension Other      Social History     Tobacco Use    Smoking status: Former Smoker    Smokeless tobacco: Never Used   Substance Use Topics    Alcohol use: No    Drug use: No     Review of Systems   Constitutional: Negative for fever.   HENT:  Negative for sore throat.    Eyes: Negative for visual disturbance.   Respiratory: Negative for shortness of breath.    Cardiovascular: Negative for chest pain.   Gastrointestinal: Negative for nausea.   Genitourinary: Positive for vaginal discharge. Negative for dysuria.        Positive for vaginal itching and vaginal burning.   Musculoskeletal: Negative for back pain.   Skin: Negative for rash.   Neurological: Negative for weakness.       Physical Exam     Initial Vitals [04/08/19 1247]   BP Pulse Resp Temp SpO2   (!) 153/105 95 18 98.4 °F (36.9 °C) 98 %      MAP       --         Physical Exam    Nursing note and vitals reviewed.  Constitutional: She appears well-developed and well-nourished. She is not diaphoretic. No distress.   HENT:   Head: Normocephalic and atraumatic.   Mouth/Throat: Oropharynx is clear and moist.   Eyes: Conjunctivae and EOM are normal. Pupils are equal, round, and reactive to light.   Neck: Normal range of motion. Neck supple. No JVD present.   Cardiovascular: Normal rate, regular rhythm and normal heart sounds.   No murmur heard.  Pulmonary/Chest: Breath sounds normal. No respiratory distress. She has no wheezes. She has no rhonchi. She has no rales.   Abdominal: Soft. Bowel sounds are normal. She exhibits no distension and no mass. There is no tenderness. There is no rebound and no guarding.   Genitourinary:   Genitourinary Comments: Pelvic exam: vaginal irritation and slight discharge.    Musculoskeletal: Normal range of motion. She exhibits no edema or tenderness.   Neurological: She is alert and oriented to person, place, and time. She has normal strength. No cranial nerve deficit or sensory deficit.   Skin: Skin is warm and dry. No rash noted.   Psychiatric: She has a normal mood and affect. Thought content normal.         ED Course   Procedures  Labs Reviewed   VAGINAL SCREEN - Abnormal; Notable for the following components:       Result Value    WBC - Vaginal Screen Rare (*)      All other components within normal limits   C. TRACHOMATIS/N. GONORRHOEAE BY AMP DNA   POCT URINE PREGNANCY          Imaging Results    None          Medical Decision Making:   History:   Old Medical Records: I decided to obtain old medical records.  Initial Assessment:   Pt with vaginal itching from using lavender toilet paper. Her pelvic exam show vaginal irritation and slight discharge. This is more irritation than I would expect from contact dermatitis, will send off samples.     Clinical Tests:   Lab Tests: Ordered and Reviewed  ED Management:  14:42 - Will discharge home with a script of flagyl for vaginitis.            Scribe Attestation:   Scribe #1: I performed the above scribed service and the documentation accurately describes the services I performed. I attest to the accuracy of the note.               Clinical Impression:       ICD-10-CM ICD-9-CM   1. Acute vaginitis N76.0 616.10         Disposition:   Disposition: Discharged  Condition: Stable                        Bill Walker MD  04/08/19 0206

## 2019-04-08 NOTE — ED NOTES
LOC: The patient is awake, alert, and oriented to place, time, situation. Affect is appropriate.  Speech is appropriate and clear.     APPEARANCE: Patient resting uncomfortably, vaginal discharge and itching,  in no acute distress.  Patient is clean and well groomed.    SKIN: The skin is warm and dry; color consistent with ethnicity.  Patient has normal skin turgor and moist mucus membranes.  Skin intact; no breakdown or bruising noted.     MUSCULOSKELETAL: Patient moving upper and lower extremities without difficulty.  Denies weakness.     RESPIRATORY: Airway is open and patent. Respirations spontaneous, even, easy, and non-labored.  Patient has a normal effort and rate.  No accessory muscle use noted. Denies cough.     CARDIAC:  No peripheral edema noted. No complaints of chest pain.      ABDOMEN: Soft and non tender to palpation.  No distention noted.     NEUROLOGIC: Eyes open spontaneously.  Behavior appropriate to situation.  Follows commands; facial expression symmetrical.  Purposeful motor response noted; normal sensation in all extremities.

## 2019-04-09 LAB
C TRACH DNA SPEC QL NAA+PROBE: NOT DETECTED
N GONORRHOEA DNA SPEC QL NAA+PROBE: NOT DETECTED

## 2019-05-29 ENCOUNTER — OFFICE VISIT (OUTPATIENT)
Dept: SPORTS MEDICINE | Facility: CLINIC | Age: 29
End: 2019-05-29
Payer: MEDICAID

## 2019-05-29 ENCOUNTER — HOSPITAL ENCOUNTER (OUTPATIENT)
Dept: RADIOLOGY | Facility: HOSPITAL | Age: 29
Discharge: HOME | End: 2019-05-29
Attending: FAMILY MEDICINE
Payer: MEDICAID

## 2019-05-29 VITALS — BODY MASS INDEX: 45.24 KG/M2 | HEIGHT: 64 IN | TEMPERATURE: 98 F | WEIGHT: 265 LBS

## 2019-05-29 DIAGNOSIS — M25.561 PAIN IN BOTH KNEES, UNSPECIFIED CHRONICITY: ICD-10-CM

## 2019-05-29 DIAGNOSIS — M25.562 PAIN IN BOTH KNEES, UNSPECIFIED CHRONICITY: ICD-10-CM

## 2019-05-29 DIAGNOSIS — M25.562 MECHANICAL KNEE PAIN, LEFT: Primary | ICD-10-CM

## 2019-05-29 DIAGNOSIS — M25.562 ACUTE PAIN OF LEFT KNEE: ICD-10-CM

## 2019-05-29 PROCEDURE — 99204 PR OFFICE/OUTPT VISIT, NEW, LEVL IV, 45-59 MIN: ICD-10-PCS | Mod: S$PBB,,, | Performed by: FAMILY MEDICINE

## 2019-05-29 PROCEDURE — 73564 X-RAY EXAM KNEE 4 OR MORE: CPT | Mod: TC,50,FY,PO

## 2019-05-29 PROCEDURE — 99213 OFFICE O/P EST LOW 20 MIN: CPT | Mod: PBBFAC,25,PO | Performed by: FAMILY MEDICINE

## 2019-05-29 PROCEDURE — 99999 PR PBB SHADOW E&M-EST. PATIENT-LVL III: CPT | Mod: PBBFAC,,, | Performed by: FAMILY MEDICINE

## 2019-05-29 PROCEDURE — 73564 XR KNEE ORTHO BILAT WITH FLEXION: ICD-10-PCS | Mod: 26,50,, | Performed by: RADIOLOGY

## 2019-05-29 PROCEDURE — 99204 OFFICE O/P NEW MOD 45 MIN: CPT | Mod: S$PBB,,, | Performed by: FAMILY MEDICINE

## 2019-05-29 PROCEDURE — 73564 X-RAY EXAM KNEE 4 OR MORE: CPT | Mod: 26,50,, | Performed by: RADIOLOGY

## 2019-05-29 PROCEDURE — 99999 PR PBB SHADOW E&M-EST. PATIENT-LVL III: ICD-10-PCS | Mod: PBBFAC,,, | Performed by: FAMILY MEDICINE

## 2019-05-29 RX ORDER — MELOXICAM 7.5 MG/1
7.5 TABLET ORAL DAILY
Qty: 15 TABLET | Refills: 0 | Status: SHIPPED | OUTPATIENT
Start: 2019-05-29 | End: 2019-06-04

## 2019-05-29 NOTE — PROGRESS NOTES
Catina Myers, a 28 y.o. female, presents today for evaluation of her left knee.      History of Present Illness (HPI)  Location: anterior knee, left  Onset: acute trauma, 19.04  Palliative:    Relative rest   Oral analgesics - pt has tried and failed NSAIDS/Acetaminophen for > 4 weeks.  Provocative:    ADLS   Prolonged ambulation   prolonged sitting   sleeping  Prior: none  Progression: worsening discomfort  Quality:    sharp pain   unstable  Radiation: none  Severity: per nursing documentation  Timing: intermittent w/ use  Trauma:    ~ 2 years ago patient hit her left knee when she slipped on a wet floor and fell   19.04.14 - Patient had a mechanical fall resulting in a pop and twisting motion of her left knee. Patient reports that she heard a pop and her knee gave out.    Review of Systems (ROS)  A 10+ review of systems was performed with pertinent positives and negatives noted above in the history of present illness. Other systems were negative unless otherwise specified.    Physical Examination (PE)  General:  The patient is alert and oriented x 3. Mood is pleasant. Observation of ears, eyes and nose reveal no gross abnormalities. HEENT: NCAT, sclera anicteric.   Lungs: Respirations are equal and unlabored.  Gait is coordinated. Patient can toe walk and heel walk without difficulty.    LEFT KNEE EXAMINATION    Observation/Inspection  Gait:   antalgic   Alignment:  Neutral   Scars:   None   Muscle atrophy: Mild  Effusion:  None   Warmth:  None   Discoloration:   none     Tenderness / Crepitus (T / C):         T / C      T / C  Patella   + / -   Lateral joint line   - / -     Peripatellar medial  -  Medial joint line    + / -  Peripatellar lateral -  Medial plica   - / -  Patellar tendon -   Popliteal fossa   - / -  Quad tendon   -   Gastrocnemius   -  Prepatellar Bursa - / -   Quadricep   -  Tibial tubercle  -  Thigh/hamstring  -  Pes anserine/HS -  Fibula    -  ITB   - / -  Tibia     -  Tib/fib joint  - /  -  LCL    -    MFC   + / -   MCL: Proximal  -    LFC   - / -   Distal    -          ROM: (* = pain)  PASSIVE   ACTIVE    Left :   5 / 0 / 145   5 / 0 / 145     Right :    *-5 / 0 / 90*   *-5 / 0 / 90*    Patellofemoral examination:  See above noted areas of tenderness.   Patella position    Subluxation / dislocation: Centered        Sup. / Inf;   Normal   Crepitus (PF):    Absent   Patellar Mobility:       Medial-lateral:   Normal    Superior-inferior:  Normal    Inferior tilt   Normal    Patellar tendon:  Normal   Lateral tilt:    Normal   J-sign:     None   Patellofemoral grind:   No pain     Meniscal Signs:     Pain on terminal extension:  +*  Pain on terminal flexion:  +*  Leanns maneuver:  +*  Squat     NT due to pain  Thessaly    NT due to Pain    Ligament Examination:  ACL / Lachman:  WNL  PCL-Post.  drawer: normal 0 to 2mm  MCL- Valgus:  normal 0 to 2mm  LCL- Varus:    normal 0 to 2mm  Pivot shift:  guarding   Dial Test:   difference c/w other side   At 30° flexion: normal (< 5°)    At 90° flexion: normal (< 5°)   Reverse Pivot Shift:   normal (Equal)     Strength: (* = with pain) Painful Side  Quadriceps   3+/5*  Hamstring:   3+/5*    Extremity Neuro-vascular Examination:   Sensation:  Grossly intact to light touch all dermatomal regions.   Motor Function:  Fully intact motor function at hip, knee, foot and ankle    DTRs;  quadriceps and  achilles 2+.  No clonus and downgoing Babinski.    Vascular status:  DP and PT pulses 2+, brisk capillary refill, symmetric.     Other Findings:    ASSESSMENT & PLAN  Assessment:   #1 Knee pain w/ mechanical symptoms, s/p trauma, left    No evidence of neurologic pathology  No evidence of vascular pathology    Imaging studies reviewed:   X-ray knee, bilateral 19.05    Plan:    Given extreme dysfunction and discomfort, coupled with physical examination suggesting meniscal pathology and non-diagnostic x-ray imaging, we will obtain MRI imaging for further evaluation  of the soft tissue structures of the knee.     [We discussed the importance of appropriate diet, weight, and regular exercise including quadriceps strengthening     We discussed options including:  #1 watchful waiting  #2 physical therapy aimed at:   Core stability   RoM knee   Strengthening quadriceps   Gait training   #3 injection therapy:   CSI iaknee     Right,     Left,    VSI iaknee    Right,     Left,    Orthobiologics   #4 consultation      The patient chooses #]    Pain management: handout given  Bracing: crutches, t  Physical therapy:   Activity (e.g. 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

## 2019-05-30 ENCOUNTER — HOSPITAL ENCOUNTER (OUTPATIENT)
Dept: RADIOLOGY | Facility: HOSPITAL | Age: 29
Discharge: HOME | End: 2019-05-30
Attending: FAMILY MEDICINE
Payer: MEDICAID

## 2019-05-30 DIAGNOSIS — M25.562 ACUTE PAIN OF LEFT KNEE: ICD-10-CM

## 2019-05-30 DIAGNOSIS — M25.562 MECHANICAL KNEE PAIN, LEFT: ICD-10-CM

## 2019-05-30 PROCEDURE — 73721 MRI JNT OF LWR EXTRE W/O DYE: CPT | Mod: TC,LT

## 2019-05-30 PROCEDURE — 73721 MRI KNEE WITHOUT CONTRAST LEFT: ICD-10-PCS | Mod: 26,LT,, | Performed by: RADIOLOGY

## 2019-05-30 PROCEDURE — 73721 MRI JNT OF LWR EXTRE W/O DYE: CPT | Mod: 26,LT,, | Performed by: RADIOLOGY

## 2019-06-01 ENCOUNTER — PATIENT MESSAGE (OUTPATIENT)
Dept: SPORTS MEDICINE | Facility: CLINIC | Age: 29
End: 2019-06-01

## 2019-06-02 ENCOUNTER — PATIENT MESSAGE (OUTPATIENT)
Dept: SPORTS MEDICINE | Facility: CLINIC | Age: 29
End: 2019-06-02

## 2019-06-03 ENCOUNTER — TELEPHONE (OUTPATIENT)
Dept: SPORTS MEDICINE | Facility: CLINIC | Age: 29
End: 2019-06-03

## 2019-06-03 DIAGNOSIS — Z71.3 NUTRITIONAL COUNSELING: ICD-10-CM

## 2019-06-03 RX ORDER — TRAMADOL HYDROCHLORIDE 50 MG/1
50 TABLET ORAL EVERY 6 HOURS
Qty: 5 TABLET | Refills: 0 | Status: SHIPPED | OUTPATIENT
Start: 2019-06-03

## 2019-06-03 RX ORDER — TRAMADOL HYDROCHLORIDE 50 MG/1
50 TABLET ORAL EVERY 6 HOURS
Qty: 5 TABLET | Refills: 0 | Status: CANCELLED | OUTPATIENT
Start: 2019-06-03

## 2019-06-03 NOTE — TELEPHONE ENCOUNTER
Patient communication:    Mri results left knee explained in detail. Patient will RTC to determine next steps on 19.06.03      Eulalio Prabhakar   Sports Medicine Assistant   [facility]

## 2019-06-03 NOTE — TELEPHONE ENCOUNTER
Patient communication:    Patient counseling on diet/nutrition - referral placed to nutrition for further evaluation  Patient understands the BMI goal under 40    Patient scheduled for f/u in clinic at 8:00 AM 6/4/2019    Eulalio Prabhakar   Sports Medicine Assistant   [facility]

## 2019-06-04 ENCOUNTER — OFFICE VISIT (OUTPATIENT)
Dept: SPORTS MEDICINE | Facility: CLINIC | Age: 29
End: 2019-06-04
Payer: MEDICAID

## 2019-06-04 VITALS — TEMPERATURE: 99 F

## 2019-06-04 DIAGNOSIS — M25.462 EFFUSION OF LEFT KNEE: ICD-10-CM

## 2019-06-04 DIAGNOSIS — M25.562 ACUTE PAIN OF LEFT KNEE: ICD-10-CM

## 2019-06-04 DIAGNOSIS — M23.92 DERANGEMENT OF LEFT KNEE: ICD-10-CM

## 2019-06-04 DIAGNOSIS — S83.512D RUPTURE OF ANTERIOR CRUCIATE LIGAMENT OF LEFT KNEE, SUBSEQUENT ENCOUNTER: Primary | ICD-10-CM

## 2019-06-04 PROCEDURE — 20611 DRAIN/INJ JOINT/BURSA W/US: CPT | Mod: PBBFAC,PO | Performed by: FAMILY MEDICINE

## 2019-06-04 PROCEDURE — 99999 PR PBB SHADOW E&M-EST. PATIENT-LVL III: ICD-10-PCS | Mod: PBBFAC,,, | Performed by: FAMILY MEDICINE

## 2019-06-04 PROCEDURE — 99214 PR OFFICE/OUTPT VISIT, EST, LEVL IV, 30-39 MIN: ICD-10-PCS | Mod: 25,S$PBB,, | Performed by: FAMILY MEDICINE

## 2019-06-04 PROCEDURE — 99214 OFFICE O/P EST MOD 30 MIN: CPT | Mod: 25,S$PBB,, | Performed by: FAMILY MEDICINE

## 2019-06-04 PROCEDURE — 20611 LARGE JOINT ASPIRATION/INJECTION: L KNEE: ICD-10-PCS | Mod: S$PBB,LT,, | Performed by: FAMILY MEDICINE

## 2019-06-04 PROCEDURE — 99999 PR PBB SHADOW E&M-EST. PATIENT-LVL III: CPT | Mod: PBBFAC,,, | Performed by: FAMILY MEDICINE

## 2019-06-04 PROCEDURE — 99213 OFFICE O/P EST LOW 20 MIN: CPT | Mod: PBBFAC,PO,25 | Performed by: FAMILY MEDICINE

## 2019-06-04 NOTE — PROGRESS NOTES
Catina Myers, a 28 y.o. female, presents today for evaluation of her left knee.      History of Present Illness (HPI)  Location: anterior knee, left  Onset: acute trauma, 19.04  Palliative:    Relative rest   Oral analgesics - pt has tried and failed NSAIDS/Acetaminophen for > 4 weeks.  Provocative:    ADLS   Prolonged ambulation   prolonged sitting   sleeping  Prior: none  Progression: worsening discomfort  Quality:    sharp pain   unstable  Radiation: none  Severity: per nursing documentation  Timing: intermittent w/ use  Trauma:    ~ 2 years ago patient hit her left knee when she slipped on a wet floor and fell   19.04.14 - Patient had a mechanical fall resulting in a pop and twisting motion of her left knee. Patient reports that she heard a pop and her knee gave out.    Review of Systems (ROS)  A 10+ review of systems was performed with pertinent positives and negatives noted above in the history of present illness. Other systems were negative unless otherwise specified.    Physical Examination (PE)  General:  The patient is alert and oriented x 3. Mood is pleasant. Observation of ears, eyes and nose reveal no gross abnormalities. HEENT: NCAT, sclera anicteric.   Lungs: Respirations are equal and unlabored.  Gait is coordinated. Patient can toe walk and heel walk without difficulty.    LEFT KNEE EXAMINATION    Observation/Inspection  Gait:   antalgic   Alignment:  Neutral   Scars:   None   Muscle atrophy: Mild  Effusion:  None   Warmth:  None   Discoloration:   none     Tenderness / Crepitus (T / C):         T / C      T / C  Patella   + / -   Lateral joint line   - / -     Peripatellar medial  -  Medial joint line    + / -  Peripatellar lateral -  Medial plica   - / -  Patellar tendon -   Popliteal fossa   - / -  Quad tendon   -   Gastrocnemius   -  Prepatellar Bursa - / -   Quadricep   -  Tibial tubercle  -  Thigh/hamstring  -  Pes anserine/HS -  Fibula    -  ITB   - / -  Tibia     -  Tib/fib joint  - /  -  LCL    -    MFC   + / -   MCL: Proximal  -    LFC   - / -   Distal    -          ROM: (* = pain)  PASSIVE   ACTIVE    Left :   5 / 0 / 145   5 / 0 / 145     Right :    *-5 / 0 / 90*   *-5 / 0 / 90*    Patellofemoral examination:  See above noted areas of tenderness.   Patella position    Subluxation / dislocation: Centered        Sup. / Inf;   Normal   Crepitus (PF):    Absent   Patellar Mobility:       Medial-lateral:   Normal    Superior-inferior:  Normal    Inferior tilt   Normal    Patellar tendon:  Normal   Lateral tilt:    Normal   J-sign:     None   Patellofemoral grind:   No pain     Meniscal Signs:     Pain on terminal extension:  +*  Pain on terminal flexion:  +*  Leanns maneuver:  +*  Squat     NT due to pain  Thessaly    NT due to Pain    Ligament Examination:  ACL / Lachman:  WNL  PCL-Post.  drawer: normal 0 to 2mm  MCL- Valgus:  normal 0 to 2mm  LCL- Varus:    normal 0 to 2mm  Pivot shift:  guarding   Dial Test:   difference c/w other side   At 30° flexion: normal (< 5°)    At 90° flexion: normal (< 5°)   Reverse Pivot Shift:   normal (Equal)     Strength: (* = with pain) Painful Side  Quadriceps   3+/5*  Hamstring:   3+/5*    Extremity Neuro-vascular Examination:   Sensation:  Grossly intact to light touch all dermatomal regions.   Motor Function:  Fully intact motor function at hip, knee, foot and ankle    DTRs;  quadriceps and  achilles 2+.  No clonus and downgoing Babinski.    Vascular status:  DP and PT pulses 2+, brisk capillary refill, symmetric.     Other Findings:    ASSESSMENT & PLAN  Assessment:   #1 ACL deficient, m+l meniscal tearing, knee effusion, left    No evidence of neurologic pathology  No evidence of vascular pathology    Imaging studies reviewed:   X-ray knee, bilateral 19.05  MRI knee, left 19.05    Plan:    We discussed the importance of appropriate diet, weight, and regular exercise including quadriceps strengthening     We discussed options including:  #1 watchful  waiting  #2 physical therapy aimed at:   Core stability   RoM knee   Strengthening quadriceps   Gait training   #3 injection therapy:   CSI iaknee     Right,     Left,    VSI iaknee    Right,     Left,    Orthobiologics   #4 consultation w/ ortho colleagues @ [facility]     The patient chooses #2 and #3 ASP only and #4     Pain management: handout given  Bracing: crutches, prior  Physical therapy: fPT, @ [facility], begin as above   Activity (e.g. 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

## 2019-06-04 NOTE — PROCEDURES
"Large Joint Aspiration/Injection: L knee  Date/Time: 6/4/2019 11:09 AM  Performed by: Micheal Corcoran MD  Authorized by: Micheal Corcoran MD     Consent Done?:  Yes (Verbal)  Indications:  Pain  Procedure site marked: Yes    Timeout: Prior to procedure the correct patient, procedure, and site was verified      Location:  Knee  Site:  L knee  Prep: Patient was prepped and draped in usual sterile fashion    Ultrasonic Guidance for needle placement: Yes  Images are saved and documented.  Needle size:  20 G  Approach:  Lateral  Aspirate amount (ml):  18  Aspirate:  Clear  Patient tolerance:  Patient tolerated the procedure well with no immediate complications    Additional Comments: Description of ultrasound utilization for needle guidance:   Ultrasound guidance used for needle localization. Images saved and stored for documentation. The knee joint was visualized. Dynamic visualization of the 20g x 3.5" needle was continuous throughout the procedure.      "

## 2019-06-05 ENCOUNTER — HOSPITAL ENCOUNTER (EMERGENCY)
Facility: HOSPITAL | Age: 29
Discharge: HOME | End: 2019-06-05
Attending: EMERGENCY MEDICINE
Payer: MEDICAID

## 2019-06-05 VITALS
WEIGHT: 265 LBS | DIASTOLIC BLOOD PRESSURE: 71 MMHG | RESPIRATION RATE: 19 BRPM | HEART RATE: 91 BPM | TEMPERATURE: 99 F | BODY MASS INDEX: 45.24 KG/M2 | SYSTOLIC BLOOD PRESSURE: 94 MMHG | OXYGEN SATURATION: 99 % | HEIGHT: 64 IN

## 2019-06-05 DIAGNOSIS — J02.0 STREP PHARYNGITIS: Primary | ICD-10-CM

## 2019-06-05 LAB
B-HCG UR QL: NEGATIVE
CTP QC/QA: YES

## 2019-06-05 PROCEDURE — 99283 EMERGENCY DEPT VISIT LOW MDM: CPT | Mod: ER

## 2019-06-05 PROCEDURE — 81025 URINE PREGNANCY TEST: CPT | Mod: ER | Performed by: EMERGENCY MEDICINE

## 2019-06-05 RX ORDER — SULFAMETHOXAZOLE AND TRIMETHOPRIM 800; 160 MG/1; MG/1
1 TABLET ORAL 2 TIMES DAILY
Qty: 14 TABLET | Refills: 0 | Status: SHIPPED | OUTPATIENT
Start: 2019-06-05 | End: 2019-06-12

## 2019-06-06 NOTE — ED PROVIDER NOTES
Encounter Date: 2019    SCRIBE #1 NOTE: I, Joycelyn Edge, am scribing for, and in the presence of,  Dr. Marie. I have scribed the following portions of the note - Other sections scribed: HPI, ROS, PE.       History     Chief Complaint   Patient presents with    Sore Throat     x 1 week, states now has puss in back of throat     Catina Myers is a 28 y.o. female who presents to the ED complaining of puss on the tonsills onset 1 week.  Pt reports that the throat is bothering her when she swallows.    The history is provided by the patient. No  was used.     Review of patient's allergies indicates:   Allergen Reactions    Latex, natural rubber Hives     Patient states last incident was a couple years ago and needed to go to ER for hives breakout.     Amoxil [amoxicillin]     Orange juice Hives    Toradol [ketorolac] Nausea And Vomiting     Past Medical History:   Diagnosis Date    Chlamydia     Chronic hypertension in obstetric context in second trimester     Flu     High-risk pregnancy     UTI (lower urinary tract infection)     Yeast infection      Past Surgical History:   Procedure Laterality Date     SECTION      DELIVERY- SECTION N/A 2018    Performed by [facility] at [facility] OR    ENCERCLAGE N/A 2017    Performed by Lynn Siddiqi MD at [facility] [place] L&D    HERNIA REPAIR       Family History   Problem Relation Age of Onset    No Known Problems Mother     No Known Problems Father     Diabetes Other     Hypertension Other      Social History     Tobacco Use    Smoking status: Former Smoker    Smokeless tobacco: Never Used   Substance Use Topics    Alcohol use: No    Drug use: No     Review of Systems   Constitutional: Negative.  Negative for fever.   HENT: Positive for trouble swallowing. Negative for sore throat.         Positive pus on tonsils.   Eyes: Negative.  Negative for redness.   Respiratory: Negative.  Negative for shortness of breath.     Cardiovascular: Negative.  Negative for chest pain.   Gastrointestinal: Negative.  Negative for nausea and vomiting.   Genitourinary: Negative.  Negative for dysuria.   Musculoskeletal: Negative.  Negative for myalgias.   Skin: Negative.  Negative for rash.   Neurological: Negative.  Negative for headaches.   Psychiatric/Behavioral: Negative.  Negative for behavioral problems.   All other systems reviewed and are negative.      Physical Exam     Initial Vitals [06/05/19 1856]   BP Pulse Resp Temp SpO2   94/71 91 19 99.3 °F (37.4 °C) 99 %      MAP       --         Physical Exam    Nursing note and vitals reviewed.  Constitutional: She appears well-developed and well-nourished.   HENT:   Head: Normocephalic and atraumatic.   Right Ear: External ear normal.   Left Ear: External ear normal.   Nose: Mucosal edema present.   Mouth/Throat: Uvula is midline. Oropharyngeal exudate and posterior oropharyngeal erythema present.   Eyes: Conjunctivae are normal.   Neck: Normal range of motion. Neck supple.   Cardiovascular: Normal rate and intact distal pulses.   Pulmonary/Chest: Effort normal. No respiratory distress.   Abdominal: Soft. There is no tenderness.   Musculoskeletal: Normal range of motion.   Lymphadenopathy:     She has cervical adenopathy.   Neurological: She is alert and oriented to person, place, and time.   Skin: Skin is warm and dry. Capillary refill takes less than 2 seconds.   Psychiatric: She has a normal mood and affect. Her behavior is normal.         ED Course   Procedures  Labs Reviewed   POCT URINE PREGNANCY          Imaging Results    None          Medical Decision Making:   Clinical Tests:   Lab Tests: Ordered and Reviewed  ED Management:  This patient had for a for [facility]er criteria            Scribe Attestation:   Scribe #1: I performed the above scribed service and the documentation accurately describes the services I performed. I attest to the accuracy of the note.    This document was produced  by a scribe under my direction and in my presence. I agree with the content of the note and have made any necessary edits.     Shin Marie MD    06/05/2019 7:31 PM           Clinical Impression:     1. Strep pharyngitis                                   Shin Marie MD  06/05/19 1931

## 2019-06-10 ENCOUNTER — PATIENT MESSAGE (OUTPATIENT)
Dept: SPORTS MEDICINE | Facility: CLINIC | Age: 29
End: 2019-06-10

## 2019-06-17 ENCOUNTER — PATIENT MESSAGE (OUTPATIENT)
Dept: SPORTS MEDICINE | Facility: CLINIC | Age: 29
End: 2019-06-17

## 2019-06-18 NOTE — TELEPHONE ENCOUNTER
Spoke to the patient over the phone re: last message. Patient was understanding of the process and I explained why she had to go through medical records and that since she has medicaid we referred her to [facility] who accepts this insurance.       Eulalio Prabhakar   Sports Medicine Assistant   [facility] [place]

## 2019-07-01 ENCOUNTER — OFFICE VISIT (OUTPATIENT)
Dept: URGENT CARE | Facility: CLINIC | Age: 29
End: 2019-07-01
Payer: MEDICAID

## 2019-07-01 VITALS
OXYGEN SATURATION: 100 % | DIASTOLIC BLOOD PRESSURE: 70 MMHG | HEIGHT: 64 IN | TEMPERATURE: 98 F | SYSTOLIC BLOOD PRESSURE: 126 MMHG | WEIGHT: 265 LBS | BODY MASS INDEX: 45.24 KG/M2 | HEART RATE: 80 BPM

## 2019-07-01 DIAGNOSIS — D49.2 GROWTH OF EAR CANAL: Primary | ICD-10-CM

## 2019-07-01 PROCEDURE — 99214 PR OFFICE/OUTPT VISIT, EST, LEVL IV, 30-39 MIN: ICD-10-PCS | Mod: S$GLB,,, | Performed by: PHYSICIAN ASSISTANT

## 2019-07-01 PROCEDURE — 99214 OFFICE O/P EST MOD 30 MIN: CPT | Mod: S$GLB,,, | Performed by: PHYSICIAN ASSISTANT

## 2019-07-01 RX ORDER — SULFAMETHOXAZOLE AND TRIMETHOPRIM 800; 160 MG/1; MG/1
1 TABLET ORAL 2 TIMES DAILY
Qty: 10 TABLET | Refills: 0 | Status: SHIPPED | OUTPATIENT
Start: 2019-07-01 | End: 2019-07-06

## 2019-07-02 ENCOUNTER — TELEPHONE (OUTPATIENT)
Dept: OTOLARYNGOLOGY | Facility: CLINIC | Age: 29
End: 2019-07-02

## 2019-07-02 NOTE — TELEPHONE ENCOUNTER
----- Message from Zulema Eduardo sent at 7/2/2019  1:05 PM CDT -----  Contact: Self  Same Day Appointment Request    Was an appointment with another provider offered?   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    Please advise if she can be seen today.    Thanks.

## 2019-07-02 NOTE — PROGRESS NOTES
"Subjective:       Patient ID: Catina Myers is a 28 y.o. female.    Vitals:  height is 5' 4" (1.626 m) and weight is 120.2 kg (265 lb). Her temperature is 98.1 °F (36.7 °C). Her blood pressure is 126/70 and her pulse is 80. Her oxygen saturation is 100%.     Chief Complaint: Otalgia (left ear)    Pt reports having left ear pain for 2 days      Otalgia    There is pain in the left ear. This is a new problem. The current episode started in the past 7 days. The problem occurs constantly. The problem has been gradually worsening. There has been no fever. The patient is experiencing no pain. Pertinent negatives include no coughing, diarrhea, headaches, rash, sore throat or vomiting. She has tried nothing for the symptoms.       Constitution: Negative for chills, fatigue and fever.   HENT: Positive for ear pain. Negative for congestion and sore throat.    Neck: Negative for painful lymph nodes.   Cardiovascular: Negative for chest pain and leg swelling.   Eyes: Negative for double vision and blurred vision.   Respiratory: Negative for cough and shortness of breath.    Gastrointestinal: Negative for nausea, vomiting and diarrhea.   Genitourinary: Negative for dysuria, frequency, urgency and history of kidney stones.   Musculoskeletal: Negative for joint pain, joint swelling, muscle cramps and muscle ache.   Skin: Negative for color change, pale, rash, erythema and bruising.   Allergic/Immunologic: Negative for seasonal allergies.   Neurological: Negative for dizziness, history of vertigo, light-headedness, passing out and headaches.   Hematologic/Lymphatic: Negative for swollen lymph nodes.   Psychiatric/Behavioral: Negative for nervous/anxious, sleep disturbance and depression. The patient is not nervous/anxious.        Objective:      Physical Exam   Constitutional: She is oriented to person, place, and time. She appears well-developed and well-nourished.  Non-toxic appearance. She does not have a sickly appearance. " She does not appear ill. No distress.   Patient is sitting pleasantly on exam table in no acute distress. Nontoxic appearing.   HENT:   Head: Normocephalic and atraumatic. Head is without abrasion, without contusion and without laceration.   Right Ear: Hearing, tympanic membrane, external ear and ear canal normal.   Left Ear: Hearing, tympanic membrane and external ear normal. No foreign bodies.  No middle ear effusion.   Ears:    Nose: Nose normal.   Mouth/Throat: Oropharynx is clear and moist.   Growth and swelling noted to left ear canal; tragal tenderness    Eyes: Pupils are equal, round, and reactive to light. Conjunctivae, EOM and lids are normal.   Neck: Trachea normal, full passive range of motion without pain and phonation normal. Neck supple.   Cardiovascular: Normal rate, regular rhythm and normal heart sounds.   Pulmonary/Chest: Effort normal and breath sounds normal. No stridor. No respiratory distress.   Musculoskeletal: Normal range of motion.   Neurological: She is alert and oriented to person, place, and time.   Skin: Skin is warm, dry and intact. Capillary refill takes less than 2 seconds. No abrasion, no bruising, no burn, no ecchymosis, no laceration, no lesion and no rash noted. She is not diaphoretic. No erythema.   Psychiatric: She has a normal mood and affect. Her speech is normal and behavior is normal. Judgment and thought content normal. Cognition and memory are normal.   Nursing note and vitals reviewed.      Abscess vs growth in ear canal. Will cover with antibiotics and refer to ENT for further evaluation. Patient reports she can be seen by ENT tomorrow. Advised on return/follow-up precautions. Advised on ER precautions. Answered all patient questions. Patient verbalized understanding and voiced agreement with current treatment plan.    Assessment:       1. Growth of ear canal        Plan:         Growth of ear canal  -     sulfamethoxazole-trimethoprim 800-160mg (BACTRIM DS) 800-160 mg  Tab; Take 1 tablet by mouth 2 (two) times daily. for 5 days  Dispense: 10 tablet; Refill: 0  -     Ambulatory referral to ENT      Patient Instructions     Follow up with ENT tomorrow as we discussed for further evaluation/treatment    Take full course of antibiotics as directed    If your condition worsens or fails to improve we recommend that you receive another evaluation at the ER immediately or contact your PCP to discuss your concerns or return here. You must understand that you've received an urgent care treatment only and that you may be released before all your medical problems are known or treated. You the patient will arrange for followup care as instructed.

## 2019-07-02 NOTE — PATIENT INSTRUCTIONS
Follow up with ENT tomorrow as we discussed for further evaluation/treatment    Take full course of antibiotics as directed    If your condition worsens or fails to improve we recommend that you receive another evaluation at the ER immediately or contact your PCP to discuss your concerns or return here. You must understand that you've received an urgent care treatment only and that you may be released before all your medical problems are known or treated. You the patient will arrange for followup care as instructed.

## 2019-10-14 ENCOUNTER — TELEPHONE (OUTPATIENT)
Dept: SPORTS MEDICINE | Facility: CLINIC | Age: 29
End: 2019-10-14

## 2019-10-14 DIAGNOSIS — S83.512D RUPTURE OF ANTERIOR CRUCIATE LIGAMENT OF LEFT KNEE, SUBSEQUENT ENCOUNTER: Primary | ICD-10-CM

## 2019-10-14 DIAGNOSIS — M25.562 ACUTE PAIN OF LEFT KNEE: ICD-10-CM

## 2019-10-14 DIAGNOSIS — M23.92 DERANGEMENT OF LEFT KNEE: ICD-10-CM

## 2019-10-14 NOTE — TELEPHONE ENCOUNTER
Per medicaid she must fail formal physical therapy    Pt. would like her knee aspirated. Scheduled appt.    Eulalio Prabhakar   Sports Medicine Assistant   [facility]         ----- Message from Eulalio Prabhakar MA sent at 10/14/2019  3:35 PM CDT -----  Contact: self      ----- Message -----  From: Kamran Butt  Sent: 10/14/2019   3:27 PM CDT  To: Nilo SCHWAB Staff    Pt is requesting an appointment to draw fluid off her left knee and receive an injection Pt ask for a call      Contact info

## 2019-10-15 ENCOUNTER — OFFICE VISIT (OUTPATIENT)
Dept: URGENT CARE | Facility: CLINIC | Age: 29
End: 2019-10-15
Payer: MEDICAID

## 2019-10-15 VITALS
WEIGHT: 265 LBS | OXYGEN SATURATION: 99 % | RESPIRATION RATE: 18 BRPM | BODY MASS INDEX: 45.24 KG/M2 | HEIGHT: 64 IN | DIASTOLIC BLOOD PRESSURE: 88 MMHG | TEMPERATURE: 97 F | HEART RATE: 70 BPM | SYSTOLIC BLOOD PRESSURE: 137 MMHG

## 2019-10-15 DIAGNOSIS — R11.2 NON-INTRACTABLE VOMITING WITH NAUSEA, UNSPECIFIED VOMITING TYPE: Primary | ICD-10-CM

## 2019-10-15 LAB
CTP QC/QA: YES
FLUAV AG NPH QL: NEGATIVE
FLUBV AG NPH QL: NEGATIVE

## 2019-10-15 PROCEDURE — 99214 OFFICE O/P EST MOD 30 MIN: CPT | Mod: S$GLB,,, | Performed by: NURSE PRACTITIONER

## 2019-10-15 PROCEDURE — 87804 POCT INFLUENZA A/B: ICD-10-PCS | Mod: QW,S$GLB,, | Performed by: NURSE PRACTITIONER

## 2019-10-15 PROCEDURE — 87804 INFLUENZA ASSAY W/OPTIC: CPT | Mod: QW,S$GLB,, | Performed by: NURSE PRACTITIONER

## 2019-10-15 PROCEDURE — 99214 PR OFFICE/OUTPT VISIT, EST, LEVL IV, 30-39 MIN: ICD-10-PCS | Mod: S$GLB,,, | Performed by: NURSE PRACTITIONER

## 2019-10-15 RX ORDER — ONDANSETRON 8 MG/1
8 TABLET, ORALLY DISINTEGRATING ORAL EVERY 8 HOURS PRN
Qty: 15 TABLET | Refills: 0 | Status: SHIPPED | OUTPATIENT
Start: 2019-10-15 | End: 2019-10-20

## 2019-10-15 NOTE — LETTER
October 15, 2019      [facility] [address], [place] [address]  Phone: [phone]  Fax: [phone]       Patient: Catina Myers   YOB: 1990  Date of Visit: 10/15/2019    To Whom It May Concern:    Mar Myers  was at [facility] on 10/15/2019. She may return to work  on 10/17/2019 with no restrictions. If you have any questions or concerns, or if I can be of further assistance, please do not hesitate to contact me.    Sincerely,          JOHN FairC

## 2019-10-15 NOTE — PROGRESS NOTES
"Subjective:       Patient ID: Catina Myers is a 29 y.o. female.    Vitals:  height is 5' 4" (1.626 m) and weight is 120.2 kg (265 lb). Her temperature is 97 °F (36.1 °C). Her blood pressure is 137/88 and her pulse is 70. Her respiration is 18 and oxygen saturation is 99%.     Chief Complaint: Diarrhea    Pt states this morning she woke up and had diarrhea and vomiting each 3 times. She still went to work and threw up there too and was sent home. She did not take any medication yet, and also states middle upper back by left side feels sore.     Diarrhea    This is a new problem. The current episode started today. The problem occurs 2 to 4 times per day. The problem has been unchanged. Associated symptoms include vomiting. Pertinent negatives include no abdominal pain, chills or fever. She has tried nothing for the symptoms.       Constitution: Negative for chills and fever.   Neck: Negative for painful lymph nodes.   Gastrointestinal: Positive for nausea, vomiting and diarrhea. Negative for abdominal pain.   Genitourinary: Negative for dysuria, frequency, urgency, urine decreased, hematuria, history of kidney stones, painful menstruation, irregular menstruation, missed menses, heavy menstrual bleeding, ovarian cysts, genital trauma, vaginal pain, vaginal discharge, vaginal bleeding, vaginal odor, painful intercourse, genital sore, painful ejaculation and pelvic pain.   Musculoskeletal: Negative for back pain.   Skin: Negative for rash and lesion.   Hematologic/Lymphatic: Negative for swollen lymph nodes.       Objective:      Physical Exam   Constitutional: She is oriented to person, place, and time. Vital signs are normal. She appears well-developed and well-nourished.   HENT:   Head: Normocephalic.   Right Ear: Hearing, tympanic membrane, external ear and ear canal normal.   Left Ear: Hearing, tympanic membrane, external ear and ear canal normal.   Nose: Nose normal.   Mouth/Throat: Uvula is midline, oropharynx " is clear and moist and mucous membranes are normal. No oropharyngeal exudate.   Eyes: Conjunctivae are normal.   Cardiovascular: Normal rate, regular rhythm and normal heart sounds.   Pulmonary/Chest: Effort normal and breath sounds normal. No stridor. No respiratory distress. She has no decreased breath sounds. She has no wheezes. She has no rhonchi. She has no rales. She exhibits no tenderness.   Abdominal: Soft. Normal appearance and bowel sounds are normal. She exhibits no distension and no mass. There is no tenderness. There is no rigidity, no rebound, no guarding, no CVA tenderness, no tenderness at McBurney's point and negative Slater's sign. No hernia.   Musculoskeletal: Normal range of motion.   Neurological: She is alert and oriented to person, place, and time.   Skin: Skin is warm and dry. Capillary refill takes less than 2 seconds.   Nursing note and vitals reviewed.        Results for orders placed or performed in visit on 10/15/19   POCT Influenza A/B   Result Value Ref Range    Rapid Influenza A Ag Negative Negative    Rapid Influenza B Ag Negative Negative     Acceptable Yes      Assessment:       1. Non-intractable vomiting with nausea, unspecified vomiting type        Plan:         Non-intractable vomiting with nausea, unspecified vomiting type  -     POCT Influenza A/B  -     ondansetron (ZOFRAN-ODT) 8 MG TbDL; Take 1 tablet (8 mg total) by mouth every 8 (eight) hours as needed.  Dispense: 15 tablet; Refill: 0          Patient Instructions   General Discharge Instructions   If you were prescribed a narcotic or controlled medication, do not drive or operate heavy equipment or machinery while taking these medications.  If you were prescribed antibiotics, please take them to completion.  You must understand that you've received an Urgent Care treatment only and that you may be released before all your medical problems are known or treated. You, the patient, will arrange for follow up  care as instructed.  Follow up with your PCP or specialty clinic as directed in the next 1-2 weeks if not improved or as needed.  You can call [phone] to schedule an appointment with the appropriate provider.  If your condition worsens we recommend that you receive another evaluation at the emergency room immediately or contact your primary medical clinics after hours call service to discuss your concerns.  Please return here or go to the Emergency Department for any concerns or worsening of condition.      Nausea & Vomiting  If your condition worsens or fails to improve we recommend that you receive another evaluation at the ER immediately or contact your PCP to discuss your concerns or return here. You must understand that you've received an urgent care treatment only and that you may be released before all your medical problems are known or treated. You the patient will arrange for followup care as instructed.   Use prescribed anti-nausea medicine as needed and prescribed   Increase fluids and rest is important   Start off with liquid diet and progress as tolerated (see below)  · Water and clear liquids are important so you do not get dehydrated. Drink a small amount at a time.  · Do not force yourself to eat, especially if you have cramps, vomiting, or diarrhea. When you finally decide to start eating, do not eat large amounts at a time, even if you are hungry.  · If you eat, avoid fatty, greasy, spicy, or fried foods.  · Do not eat dairy products if you have diarrhea; they can make the diarrhea worse  Watch for any increase pain, fever, localized pain to right lower abdomen or continued vomiting or diarrhea.  Diet for Vomiting or Diarrhea (Adult)    Your symptoms may return or get worse after eating certain foods listed below. If this happens, stop eating these foods until your symptoms ease and you feel better.  Once the vomiting stops, follow the steps below.   During the first 12 to 24 hours  During  the first 12 to 24 hours, follow this diet:  · Drinks. Plain water, sport drinks like electrolyte solutions, soft drinks without caffeine, mineral water (plain or flavored), clear fruit juices, and decaffeinated tea and coffee.  · Soups. Clear broth.  · Desserts. Plain gelatin, popsicles, and fruit juice bars. As you feel better, you may add 6 to 8 ounces of yogurt per day. If you have diarrhea, don't have foods or drinks that contain sugar, high-fructose corn syrup, or sugar alcohols.  During the next 24 hours  During the next 24 hours you may add the following to the above:  · Hot cereal, plain toast, bread, rolls, and crackers  · Plain noodles, rice, mashed potatoes, and chicken noodle or rice soup  · Unsweetened canned fruit (but not pineapple) and bananas  Don't eat more than 15 grams of fat a day. Do this by staying away from margarine, butter, oils, mayonnaise, sauces, gravies, fried foods, peanut butter, meat, poultry, and fish.  Don't eat much fiber. Stay away from raw or cooked vegetables, fresh fruits (except bananas), and bran cereals.  Limit how much caffeine and chocolate you have. Do not use any spices or seasonings except salt.  During the next 24 hours  Slowly go back to your normal diet, as you feel better and your symptoms ease.  Date Last Reviewed: 8/1/2016  © 6115-2848 [employer]. [address] [place] [address], [place] [address]. All rights reserved. This information is not intended as a substitute for professional medical care. Always follow your healthcare professional's instructions.

## 2019-10-15 NOTE — PATIENT INSTRUCTIONS
General Discharge Instructions   If you were prescribed a narcotic or controlled medication, do not drive or operate heavy equipment or machinery while taking these medications.  If you were prescribed antibiotics, please take them to completion.  You must understand that you've received an Urgent Care treatment only and that you may be released before all your medical problems are known or treated. You, the patient, will arrange for follow up care as instructed.  Follow up with your PCP or specialty clinic as directed in the next 1-2 weeks if not improved or as needed.  You can call [phone] to schedule an appointment with the appropriate provider.  If your condition worsens we recommend that you receive another evaluation at the emergency room immediately or contact your primary medical clinics after hours call service to discuss your concerns.  Please return here or go to the Emergency Department for any concerns or worsening of condition.      Nausea & Vomiting  If your condition worsens or fails to improve we recommend that you receive another evaluation at the ER immediately or contact your PCP to discuss your concerns or return here. You must understand that you've received an urgent care treatment only and that you may be released before all your medical problems are known or treated. You the patient will arrange for followup care as instructed.   Use prescribed anti-nausea medicine as needed and prescribed   Increase fluids and rest is important   Start off with liquid diet and progress as tolerated (see below)  · Water and clear liquids are important so you do not get dehydrated. Drink a small amount at a time.  · Do not force yourself to eat, especially if you have cramps, vomiting, or diarrhea. When you finally decide to start eating, do not eat large amounts at a time, even if you are hungry.  · If you eat, avoid fatty, greasy, spicy, or fried foods.  · Do not eat dairy products if you have  diarrhea; they can make the diarrhea worse  Watch for any increase pain, fever, localized pain to right lower abdomen or continued vomiting or diarrhea.  Diet for Vomiting or Diarrhea (Adult)    Your symptoms may return or get worse after eating certain foods listed below. If this happens, stop eating these foods until your symptoms ease and you feel better.  Once the vomiting stops, follow the steps below.   During the first 12 to 24 hours  During the first 12 to 24 hours, follow this diet:  · Drinks. Plain water, sport drinks like electrolyte solutions, soft drinks without caffeine, mineral water (plain or flavored), clear fruit juices, and decaffeinated tea and coffee.  · Soups. Clear broth.  · Desserts. Plain gelatin, popsicles, and fruit juice bars. As you feel better, you may add 6 to 8 ounces of yogurt per day. If you have diarrhea, don't have foods or drinks that contain sugar, high-fructose corn syrup, or sugar alcohols.  During the next 24 hours  During the next 24 hours you may add the following to the above:  · Hot cereal, plain toast, bread, rolls, and crackers  · Plain noodles, rice, mashed potatoes, and chicken noodle or rice soup  · Unsweetened canned fruit (but not pineapple) and bananas  Don't eat more than 15 grams of fat a day. Do this by staying away from margarine, butter, oils, mayonnaise, sauces, gravies, fried foods, peanut butter, meat, poultry, and fish.  Don't eat much fiber. Stay away from raw or cooked vegetables, fresh fruits (except bananas), and bran cereals.  Limit how much caffeine and chocolate you have. Do not use any spices or seasonings except salt.  During the next 24 hours  Slowly go back to your normal diet, as you feel better and your symptoms ease.  Date Last Reviewed: 8/1/2016  © 9623-0817 [employer]. [address] [place] [address], [place] [address]. All rights reserved. This information is not intended as a substitute for professional medical care. Always follow  your healthcare professional's instructions.

## 2019-10-17 ENCOUNTER — OFFICE VISIT (OUTPATIENT)
Dept: URGENT CARE | Facility: CLINIC | Age: 29
End: 2019-10-17
Payer: MEDICAID

## 2019-10-17 VITALS
TEMPERATURE: 99 F | SYSTOLIC BLOOD PRESSURE: 122 MMHG | HEART RATE: 86 BPM | OXYGEN SATURATION: 97 % | HEIGHT: 64 IN | BODY MASS INDEX: 45.24 KG/M2 | DIASTOLIC BLOOD PRESSURE: 81 MMHG | RESPIRATION RATE: 18 BRPM | WEIGHT: 265 LBS

## 2019-10-17 DIAGNOSIS — R50.9 FEVER, UNSPECIFIED FEVER CAUSE: ICD-10-CM

## 2019-10-17 DIAGNOSIS — J10.1 INFLUENZA B: Primary | ICD-10-CM

## 2019-10-17 LAB
CTP QC/QA: YES
CTP QC/QA: YES
FLUAV AG NPH QL: NEGATIVE
FLUBV AG NPH QL: POSITIVE
S PYO RRNA THROAT QL PROBE: NEGATIVE

## 2019-10-17 PROCEDURE — 87804 POCT INFLUENZA A/B: ICD-10-PCS | Mod: QW,S$GLB,, | Performed by: PHYSICIAN ASSISTANT

## 2019-10-17 PROCEDURE — 99214 OFFICE O/P EST MOD 30 MIN: CPT | Mod: 25,S$GLB,, | Performed by: PHYSICIAN ASSISTANT

## 2019-10-17 PROCEDURE — 99214 PR OFFICE/OUTPT VISIT, EST, LEVL IV, 30-39 MIN: ICD-10-PCS | Mod: 25,S$GLB,, | Performed by: PHYSICIAN ASSISTANT

## 2019-10-17 PROCEDURE — 87880 STREP A ASSAY W/OPTIC: CPT | Mod: QW,S$GLB,, | Performed by: PHYSICIAN ASSISTANT

## 2019-10-17 PROCEDURE — 87880 POCT RAPID STREP A: ICD-10-PCS | Mod: QW,S$GLB,, | Performed by: PHYSICIAN ASSISTANT

## 2019-10-17 PROCEDURE — 87804 INFLUENZA ASSAY W/OPTIC: CPT | Mod: QW,S$GLB,, | Performed by: PHYSICIAN ASSISTANT

## 2019-10-17 RX ORDER — BENZONATATE 100 MG/1
100 CAPSULE ORAL 3 TIMES DAILY PRN
Qty: 30 CAPSULE | Refills: 0 | Status: SHIPPED | OUTPATIENT
Start: 2019-10-17 | End: 2019-10-27

## 2019-10-17 RX ORDER — OSELTAMIVIR PHOSPHATE 75 MG/1
75 CAPSULE ORAL 2 TIMES DAILY
Qty: 10 CAPSULE | Refills: 0 | Status: SHIPPED | OUTPATIENT
Start: 2019-10-17 | End: 2019-10-22

## 2019-10-17 NOTE — LETTER
October 17, 2019      [facility] [address] [facility] [place] [address]  Phone: [phone]  Fax: [phone]       Patient: Catina Myers   YOB: 1990  Date of Visit: 10/17/2019    To Whom It May Concern:    Mar Myers  was at [facility] on 10/17/2019. She may return to work/school on 10/22/2019 with no restrictions. If you have any questions or concerns, or if I can be of further assistance, please do not hesitate to contact me.    Sincerely,        Chiquita Demarco PA-C

## 2019-10-18 NOTE — PATIENT INSTRUCTIONS
General Instructions for Viral URI:    Below are suggestions for symptomatic relief:              -Tylenol every 4 hours OR ibuprofen every 6 hours as needed for pain/fever.              -Salt water gargles to soothe throat pain.              -Chloroseptic spray also helps to numb throat pain.              -Nasal saline spray reduces inflammation and dryness.              -Warm face compresses to help with facial sinus pain/pressure.              -Vicks vapor rub at night.              -Flonase OTC or Nasacort OTC for nasal congestion.              -Simple foods like chicken noodle soup.              -Delsym helps with coughing at night              -Zyrtec/Claritin during the day & Benadryl at night may help with allergies.                If you DO NOT have Hypertension or any history of palpitations, it is ok to take over the counter Sudafed or Mucinex D or Allegra-D or Claritin-D or Zyrtec-D.  If you do take one of the above, it is ok to combine that with plain over the counter Mucinex or Allegra or Claritin or Zyrtec. If, for example, you are taking Zyrtec -D, you can combine that with Mucinex, but not Mucinex-D.  If you are taking Mucinex-D, you can combine that with plain Allegra or Claritin or Zyrtec.   If you DO have Hypertension or palpitations, it is safe to take Coricidin HBP for relief of sinus symptoms.     Please follow up with your primary care provider within 2-5 days if your signs and symptoms have not resolved or worsen.      If your condition worsens or fails to improve we recommend that you receive another evaluation at the emergency room immediately or contact your primary medical clinic to discuss your concerns.   You must understand that you have received an Urgent Care treatment only and that you may be released before all of your medical problems are known or treated. You, the patient, will arrange for follow up care as instructed.       Influenza (Adult)    Influenza is also called the flu.  It is a viral illness that affects the air passages of your lungs. It is different from the common cold. The flu can easily be passed from one to person to another. It may be spread through the air by coughing and sneezing. Or it can be spread by touching the sick person and then touching your own eyes, nose, or mouth.  The flu starts 1 to 3 days after you are exposed to the flu virus. It may last for 1 to 2 weeks but many people feel tired or fatigued for many weeks afterward. You usually dont need to take antibiotics unless you have a complication. This might be an ear or sinus infection or pneumonia.  Symptoms of the flu may be mild or severe. They can include extreme tiredness (wanting to stay in bed all day), chills, fevers, muscle aches, soreness with eye movement, headache, and a dry, hacking cough.  Home care  Follow these guidelines when caring for yourself at home:  · Avoid being around cigarette smoke, whether yours or other peoples.  · Acetaminophen or ibuprofen will help ease your fever, muscle aches, and headache. Dont give aspirin to anyone younger than 18 who has the flu. Aspirin can harm the liver.  · Nausea and loss of appetite are common with the flu. Eat light meals. Drink 6 to 8 glasses of liquids every day. Good choices are water, sport drinks, soft drinks without caffeine, juices, tea, and soup. Extra fluids will also help loosen secretions in your nose and lungs.  · Over-the-counter cold medicines will not make the flu go away faster. But the medicines may help with coughing, sore throat, and congestion in your nose and sinuses. Dont use a decongestant if you have high blood pressure.  · Stay home until your fever has been gone for at least 24 hours without using medicine to reduce fever.  Follow-up care  Follow up with your healthcare provider, or as advised, if you are not getting better over the next week.  If you are age 65 or older, talk with your provider about getting a  pneumococcal vaccine every 5 years. You should also get this vaccine if you have chronic asthma or COPD. All adults should get a flu vaccine every fall. Ask your provider about this.  When to seek medical advice  Call your healthcare provider right away if any of these occur:  · Cough with lots of colored mucus (sputum) or blood in your mucus  · Chest pain, shortness of breath, wheezing, or trouble breathing  · Severe headache, or face, neck, or ear pain  · New rash with fever  · Fever of 100.4°F (38°C) or higher, or as directed by your healthcare provider  · Confusion, behavior change, or seizure  · Severe weakness or dizziness  · You get a new fever or cough after getting better for a few days  Date Last Reviewed: 1/1/2017  © 6522-5757 [employer]. [address] [place] [address], [place] [address]. All rights reserved. This information is not intended as a substitute for professional medical care. Always follow your healthcare professional's instructions.

## 2019-10-24 ENCOUNTER — CLINICAL SUPPORT (OUTPATIENT)
Dept: REHABILITATION | Facility: HOSPITAL | Age: 29
End: 2019-10-24
Attending: FAMILY MEDICINE
Payer: MEDICAID

## 2019-10-24 DIAGNOSIS — M25.562 LEFT KNEE PAIN, UNSPECIFIED CHRONICITY: ICD-10-CM

## 2019-10-24 PROCEDURE — 97110 THERAPEUTIC EXERCISES: CPT

## 2019-10-24 PROCEDURE — 97161 PT EVAL LOW COMPLEX 20 MIN: CPT

## 2019-10-24 NOTE — PLAN OF CARE
"[facility]  Physical Therapy Initial Evaluation    Name: Catina Myers  Clinic Number: [id number]    Therapy Diagnosis:   Encounter Diagnosis   Name Primary?    Left knee pain, unspecified chronicity      Physician: Micheal Corcoran, *    Physician Orders: PT Eval and Treat   Medical Diagnosis from Referral:   S83.512D (ICD-10-CM) - Rupture of anterior cruciate ligament of left knee, subsequent encounter   M23.92 (ICD-10-CM) - Derangement of left knee   M25.562 (ICD-10-CM) - Acute pain of left knee       Evaluation Date: 10/24/2019  Authorization Period Expiration: 10/13/20  Plan of Care Expiration: 19  Visit # / Visits authorized:     Time In: 115 pm  Time Out: 200 pm  Total Billable Time: 45 minutes    Precautions: Standard, ACL tear    Subjective   Date of onset: 2 years prior  History of current condition - Catina reports: she fell down at a concert with heels on and first starting noticing knee pain 2 years prior. Recently, she was getting groceries out of the car and fell down at her front door. She heard it "crack" three times and states her knee has not been the same since. She went to see Dr. Corcoran who did an MRI which revealed an ACL and meniscus tear. She has been referred to PT prior to surgery for improved outcomes post surgery.       Past Medical History:   Diagnosis Date    Chlamydia     Chronic hypertension in obstetric context in second trimester     Flu     High-risk pregnancy     UTI (lower urinary tract infection)     Yeast infection      Catina Myers  has a past surgical history that includes  section and Hernia repair.    Catina has a current medication list which includes the following prescription(s): benzonatate, boric acid, fluconazole, meloxicam, norelgestromin-ethinyl estradiol, norethindrone-ethinyl estradiol, phentermine, phentermine, and tramadol.    Review of patient's allergies indicates:   Allergen Reactions    " "Latex, natural rubber Hives     Patient states last incident was a couple years ago and needed to go to ER for hives breakout.     Amoxil [amoxicillin]     Orange juice Hives    Toradol [ketorolac] Nausea And Vomiting        Imaging, MRI studies:     Prior Therapy: none  Social History: she lives with their family (one story)  Occupation: Patient access representative  Prior Level of Function: cautious with bend and prolonged standing  Current Level of Function: limited with squatting, standing to cook, sleep    Pain:  Current 0/10, worst 7/10, best 0/10   Location: left knee   Description: Aching  Aggravating Factors: Standing, Walking and Extension  Easing Factors: advil    Pts goals: "be able to get stronger and tolerate squatting to do more with my kids"    Objective       Observation: patient appears stated age, NAD      Posture: decreased WB LLE, , increased lumbar lordosis      Gait: decreased step length R, decreased WB LLE, lateral trunk lean    Range of Motion: (PROM):    Knee Left Right   Extension  (0) degrees  ( +5 hyper) degrees   Flexion  (90) degrees  (110) degrees           Strength:  Hip Left Right   Flexion 5/5 5/5   Abduction 3/5 3/5   Adduction 3+/5 3+/5   Extension 3/5 3/5   External Rot 5/5 5/5     Knee Left Right   Extension 4/5 5/5   Flexion 4+/5 5/5         Special Tests:    Knee Left Right   Anterior Drawer Positive Negative       Joint Mobility: WNL R patellar, limited due to pain L    Palpation: TTP anterior L knee    Sensation: WNL BLE    Flexibility: tight HS and gastroc L, B tight hip flexors      Plan to perform FOTO after surgery       TREATMENT   Treatment Time In: 240 pm  Treatment Time Out: 300 pm  Total Treatment time separate from Evaluation: 20 minutes    Catina received therapeutic exercises to develop strength, endurance and core stabilization for 20 minutes including:  Clamshells gtb 2x15 B  SLR 3x 10 L  Quad sets 10 sec x10 (towel under knee)  Bridges gtb 2x15    Home " Exercises and Patient Education Provided    Education provided re: HEP to Go    Written Home Exercises Provided: yes.  Exercises were reviewed and Catina was able to demonstrate them prior to the end of the session.   Pt received a written copy of exercises to perform at home. Catina demonstrated good  understanding of the education provided.     See EMR under patient instructions for exercises given.   Assessment   Catina is a 29 y.o. female referred to outpatient Physical Therapy with a medical diagnosis of L ACL tear. Pt presents with decreased L knee ROM, B hip and L knee weakness, gait impairments and fear of movement preventing functional use of LLE.     Pt prognosis is Good.   Pt will benefit from skilled outpatient Physical Therapy to address the deficits stated above and in the chart below, provide pt/family education, and to maximize pt's level of independence.     Plan of care discussed with patient: Yes  Pt's spiritual, cultural and educational needs considered and patient is agreeable to the plan of care and goals as stated below:     Anticipated Barriers for therapy: none    Medical Necessity is demonstrated by the following  History  Co-morbidities and personal factors that may impact the plan of care Co-morbidities:   high BMI    Personal Factors:   no deficits     low   Examination  Body Structures and Functions, activity limitations and participation restrictions that may impact the plan of care Body Regions:   lower extremities    Body Systems:    ROM  strength  balance  gait  motor control    Participation Restrictions:   none    Activity limitations:   Learning and applying knowledge  no deficits    General Tasks and Commands  no deficits    Communication  no deficits    Mobility  walking    Self care  no deficits    Domestic Life  cooking    Interactions/Relationships  no deficits    Life Areas  no deficits    Community and Social Life  no deficits         low   Clinical Presentation stable  and uncomplicated low   Decision Making/ Complexity Score: low     Goals:  Short Term Goals: 4 weeks   - Pt will increase ROM to 110 deg L knee flexion and +3 deg extension  - Pt will increase strength to 4/5 LLE for normalized gait  - Decrease Pain to 4/10 as worst with standing >5 min  - Pt to self correct posture with minimal cues  - Pt independent with HEP with progressions.     Long Term Goals (24 Weeks): from post operative date  - Pt will increase ROM to WNL L knee painfree for squatting and stair tolerance  - Pt will increase strength to 5/5 BLE to return to playing and lifting her kids  - Decrease Pain to 1/10 as worst after walking >30 min  - Pt to return to 80% PLOF      Plan   Plan of care Certification: 10/24/2019 to 11/21/19.    Outpatient Physical Therapy 1 times weekly for 4 weeks to include the following interventions: Manual Therapy, Moist Heat/ Ice, Neuromuscular Re-ed, Therapeutic Activites and Therapeutic Exercise.     Emily Hutchinson, PT, DPT, COMT

## 2019-11-08 ENCOUNTER — CLINICAL SUPPORT (OUTPATIENT)
Dept: REHABILITATION | Facility: HOSPITAL | Age: 29
End: 2019-11-08
Attending: FAMILY MEDICINE
Payer: MEDICAID

## 2019-11-08 DIAGNOSIS — M25.562 LEFT KNEE PAIN, UNSPECIFIED CHRONICITY: ICD-10-CM

## 2019-11-08 PROCEDURE — 97110 THERAPEUTIC EXERCISES: CPT

## 2019-11-08 NOTE — PROGRESS NOTES
"                          Physical Therapy Daily Treatment Note     Name: Catina Myers  Clinic Number: [id number]    Therapy Diagnosis:   Encounter Diagnosis   Name Primary?    Left knee pain, unspecified chronicity      Physician: Micheal Corcoran, *    Visit Date: 11/8/2019    Physician Orders: PT Eval and Treat   Medical Diagnosis from Referral:   S83.512D (ICD-10-CM) - Rupture of anterior cruciate ligament of left knee, subsequent encounter   M23.92 (ICD-10-CM) - Derangement of left knee   M25.562 (ICD-10-CM) - Acute pain of left knee         Evaluation Date: 10/24/2019  Authorization Period Expiration: 10/13/20  Plan of Care Expiration: 11/21/19  Visit # / Visits authorized: 1/ 1     Time In: 110 pm  Time Out: 200 pm   Total Billable Time: 45 minutes     Precautions: Standard, ACL tear      Subjective      Pt reports:she has been doing her exercises and that she follows up with her surgeon in December.   she was compliant with home exercise program given last session.   Response to previous treatment:good  Functional change: better quad activation    Pain: 0/10  Location: left knee      Objective     Catina received therapeutic exercises to develop strength, endurance, ROM and core stabilization for 50 minutes including:  Bike x10 min  Quad sets 5x 1 min holds  Heel slides 2x10 with 5 sec hold  Clamshells gtb 2x15 B  SLR 3x 10 L  Quad sets 10 sec x10 (towel under knee)  Bridges gtb 2x15  GSS x2 min    Home Exercises Provided and Patient Education Provided     Education provided:   - HEP to go    Written Home Exercises Provided: Patient instructed to cont prior HEP.  Exercises were reviewed and Catina was able to demonstrate them prior to the end of the session.  Catina demonstrated good  understanding of the education provided.     See EMR under Media for exercises provided {Blank single:58638::"11/8/2019","prior visit"    Assessment     Patient required frequent rest breaks with all exercises and " "required a lot of education on "good hurt". She was educated to continue HEP regularly to achieve functional goals for pre-hab status.  Catina is progressing well towards her goals.   Pt prognosis is Good.     Pt will continue to benefit from skilled outpatient physical therapy to address the deficits listed in the problem list box on initial evaluation, provide pt/family education and to maximize pt's level of independence in the home and community environment.     Pt's spiritual, cultural and educational needs considered and pt agreeable to plan of care and goals.    Anticipated barriers to physical therapy: non    Goals:   Short Term Goals: 4 weeks   - Pt will increase ROM to 110 deg L knee flexion and +3 deg extension (Progressing, not met)  - Pt will increase strength to 4/5 LLE for normalized gait (Progressing, not met)  - Decrease Pain to 4/10 as worst with standing >5 min (Progressing, not met)  - Pt to self correct posture with minimal cues (Progressing, not met)  - Pt independent with HEP with progressions.  (Progressing, not met)     Long Term Goals (24 Weeks): from post operative date (Progressing, not met)  - Pt will increase ROM to WNL L knee painfree for squatting and stair tolerance (Progressing, not met)  - Pt will increase strength to 5/5 BLE to return to playing and lifting her kids (Progressing, not met)  - Decrease Pain to 1/10 as worst after walking >30 min (Progressing, not met)  - Pt to return to 80% PLOF (Progressing, not met)    Plan     Continue POC per patient tolerance progressing ROM and knee strength prehab.    Emily Hutchinson, PT     "

## 2020-04-21 DIAGNOSIS — Z01.84 ANTIBODY RESPONSE EXAMINATION: ICD-10-CM

## 2020-05-21 DIAGNOSIS — Z01.84 ANTIBODY RESPONSE EXAMINATION: ICD-10-CM

## 2020-06-20 DIAGNOSIS — Z01.84 ANTIBODY RESPONSE EXAMINATION: ICD-10-CM

## 2020-07-20 DIAGNOSIS — Z01.84 ANTIBODY RESPONSE EXAMINATION: ICD-10-CM

## 2020-08-19 DIAGNOSIS — Z01.84 ANTIBODY RESPONSE EXAMINATION: ICD-10-CM

## 2020-09-18 DIAGNOSIS — Z01.84 ANTIBODY RESPONSE EXAMINATION: ICD-10-CM

## 2020-10-18 DIAGNOSIS — Z01.84 ANTIBODY RESPONSE EXAMINATION: ICD-10-CM

## 2020-11-16 ENCOUNTER — TELEPHONE (OUTPATIENT)
Dept: ORTHOPEDICS | Facility: CLINIC | Age: 30
End: 2020-11-16

## 2020-11-16 DIAGNOSIS — M25.562 LEFT KNEE PAIN, UNSPECIFIED CHRONICITY: Primary | ICD-10-CM

## 2020-11-16 NOTE — TELEPHONE ENCOUNTER
----- Message from Farrah Nielsen sent at 11/16/2020  8:44 AM CST -----  Pt is requesting a call back, pt states she needs to be seen asap. Please call back, she would like to be seen asap. Please call back.    Contact Info [phone] (home)

## 2020-11-16 NOTE — TELEPHONE ENCOUNTER
Spoke with pt. Pt states she is having significant left knee pain. Pt states she had an acl tear and meniscus tear last year and would like to have knee evaluated. Pt advised she will need an xray prior to appt. Pt states she would like a sooner appt. Advised pt that at this time she is scheduled for the soonest appt, but she is on the waitlist in case Dr Mars has a cancellation. All questions answered. Pt verbalized understanding.

## 2020-11-17 DIAGNOSIS — Z01.84 ANTIBODY RESPONSE EXAMINATION: ICD-10-CM

## 2020-12-09 ENCOUNTER — HOSPITAL ENCOUNTER (OUTPATIENT)
Dept: RADIOLOGY | Facility: HOSPITAL | Age: 30
Discharge: HOME | End: 2020-12-09
Attending: ORTHOPAEDIC SURGERY
Payer: MEDICAID

## 2020-12-09 ENCOUNTER — OFFICE VISIT (OUTPATIENT)
Dept: ORTHOPEDICS | Facility: CLINIC | Age: 30
End: 2020-12-09
Payer: MEDICAID

## 2020-12-09 VITALS — WEIGHT: 286.5 LBS | BODY MASS INDEX: 48.91 KG/M2 | HEIGHT: 64 IN | TEMPERATURE: 98 F | RESPIRATION RATE: 18 BRPM

## 2020-12-09 DIAGNOSIS — M17.12 PRIMARY OSTEOARTHRITIS OF LEFT KNEE: Primary | ICD-10-CM

## 2020-12-09 DIAGNOSIS — M25.562 LEFT KNEE PAIN, UNSPECIFIED CHRONICITY: ICD-10-CM

## 2020-12-09 PROCEDURE — 73560 X-RAY EXAM OF KNEE 1 OR 2: CPT | Mod: TC,RT

## 2020-12-09 PROCEDURE — 20610 DRAIN/INJ JOINT/BURSA W/O US: CPT | Mod: PBBFAC,PN | Performed by: ORTHOPAEDIC SURGERY

## 2020-12-09 PROCEDURE — 99214 OFFICE O/P EST MOD 30 MIN: CPT | Mod: S$PBB,25,, | Performed by: ORTHOPAEDIC SURGERY

## 2020-12-09 PROCEDURE — 20610 LARGE JOINT ASPIRATION/INJECTION: L KNEE: ICD-10-PCS | Mod: S$PBB,LT,, | Performed by: ORTHOPAEDIC SURGERY

## 2020-12-09 PROCEDURE — 99999 PR PBB SHADOW E&M-EST. PATIENT-LVL IV: ICD-10-PCS | Mod: PBBFAC,,, | Performed by: ORTHOPAEDIC SURGERY

## 2020-12-09 PROCEDURE — 99999 PR PBB SHADOW E&M-EST. PATIENT-LVL IV: CPT | Mod: PBBFAC,,, | Performed by: ORTHOPAEDIC SURGERY

## 2020-12-09 PROCEDURE — 99214 OFFICE O/P EST MOD 30 MIN: CPT | Mod: PBBFAC,25,PN | Performed by: ORTHOPAEDIC SURGERY

## 2020-12-09 PROCEDURE — 73562 XR KNEE ORTHO LEFT: ICD-10-PCS | Mod: 26,LT,, | Performed by: RADIOLOGY

## 2020-12-09 PROCEDURE — 73562 X-RAY EXAM OF KNEE 3: CPT | Mod: 26,LT,, | Performed by: RADIOLOGY

## 2020-12-09 PROCEDURE — 99214 PR OFFICE/OUTPT VISIT, EST, LEVL IV, 30-39 MIN: ICD-10-PCS | Mod: S$PBB,25,, | Performed by: ORTHOPAEDIC SURGERY

## 2020-12-09 RX ORDER — AMLODIPINE BESYLATE 5 MG/1
TABLET ORAL
COMMUNITY
Start: 2020-09-12

## 2020-12-09 RX ORDER — IBUPROFEN 800 MG/1
TABLET ORAL
COMMUNITY
Start: 2020-11-06

## 2020-12-09 RX ORDER — TRAZODONE HYDROCHLORIDE 50 MG/1
TABLET ORAL
COMMUNITY
Start: 2020-09-12

## 2020-12-09 RX ADMIN — TRIAMCINOLONE ACETONIDE 40 MG: 40 INJECTION, SUSPENSION INTRA-ARTICULAR; INTRAMUSCULAR at 03:12

## 2020-12-09 NOTE — PROGRESS NOTES
Subjective:    Patient ID:  Catina Myers is a 30 y.o. y.o. female who presents for initial visit for Pain of the Left Knee      29 yo female reports a 2 year h/o left knee pain. Initial onset after an injury. Pain localized medially, achy and aggravated with walking and stair climbing. Notes intermittent giving way. Denies swelling, locking, night/rest pain or constitutional sxs. Had MRI which reportedly showed ACL tear and medial meniscus tear. She has been followed at [facility] orthopedics and was scheduled to undergo left knee arthroscopy with ACLR and meniscal repair in 10/2019. Surgery was cancelled because she had not completed a course of pre-op PT.          Past Medical History:   Diagnosis Date    Chlamydia     Chronic hypertension in obstetric context in second trimester     Flu     High-risk pregnancy     UTI (lower urinary tract infection)     Yeast infection         Past Surgical History:   Procedure Laterality Date     SECTION      HERNIA REPAIR         Review of patient's allergies indicates:   Allergen Reactions    Latex, natural rubber Hives     Patient states last incident was a couple years ago and needed to go to ER for hives breakout.     Amoxil [amoxicillin]     Orange juice Hives    Toradol [ketorolac] Nausea And Vomiting          Current Outpatient Medications:     ibuprofen (ADVIL,MOTRIN) 800 MG tablet, , Disp: , Rfl:     amLODIPine (NORVASC) 5 MG tablet, TK 1 T PO QD, Disp: , Rfl:     BORIC ACID, BULK, MISC, Insert 600 mg gel suppository vaginally twice weekly. Dispense 12, Disp: , Rfl:     fluconazole (DIFLUCAN) 200 MG Tab, TK 1 T PO QD, Disp: , Rfl: 0    norelgestromin-ethinyl estradiol (XULANE) 150-35 mcg/24 hr, Place 1 patch onto the skin once a week. (Patient not taking: Reported on 10/15/2019), Disp: 4 patch, Rfl: 11    norethindrone-ethinyl estradiol (JUNEL FE 1/20) 1 mg-20 mcg (21)/75 mg (7) per tablet, Take 1 tablet by mouth once daily. (Patient not taking:  Reported on 10/15/2019), Disp: 28 tablet, Rfl: 11    phentermine (ADIPEX-P) 37.5 mg tablet, TK 1 T PO QD, Disp: , Rfl: 0    phentermine 37.5 MG capsule, Take 37.5 mg by mouth every morning., Disp: , Rfl:     traMADol (ULTRAM) 50 mg tablet, Take 1 tablet (50 mg total) by mouth every 6 (six) hours. (Patient not taking: Reported on 10/15/2019), Disp: 5 tablet, Rfl: 0    traZODone (DESYREL) 50 MG tablet, TK 1 T PO QD, Disp: , Rfl:     Social History     Socioeconomic History    Marital status: Single     Spouse name: Not on file    Number of children: Not on file    Years of education: Not on file    Highest education level: Not on file   Occupational History    Not on file   Social Needs    Financial resource strain: Not on file    Food insecurity     Worry: Not on file     Inability: Not on file    Transportation needs     Medical: Not on file     Non-medical: Not on file   Tobacco Use    Smoking status: Former Smoker    Smokeless tobacco: Never Used   Substance and Sexual Activity    Alcohol use: No    Drug use: No    Sexual activity: Yes     Partners: Male     Birth control/protection: None   Lifestyle    Physical activity     Days per week: Not on file     Minutes per session: Not on file    Stress: Not on file   Relationships    Social connections     Talks on phone: Not on file     Gets together: Not on file     Attends [employer] service: Not on file     Active member of club or organization: Not on file     Attends meetings of clubs or organizations: Not on file     Relationship status: Not on file   Other Topics Concern    Not on file   Social History Narrative    Not on file        Family History   Problem Relation Age of Onset    No Known Problems Mother     No Known Problems Father     Diabetes Other     Hypertension Other         Review of Systems   Constitutional: Negative for chills and fever.   HENT: Negative for hearing loss.    Eyes: Negative for blurred vision.   Respiratory:  "Negative for shortness of breath.    Cardiovascular: Negative for chest pain.   Gastrointestinal: Negative for nausea and vomiting.   Genitourinary: Negative for dysuria.   Musculoskeletal: Negative for myalgias.   Skin: Negative for rash.   Neurological: Negative for speech change and loss of consciousness.   Endo/Heme/Allergies: Does not bruise/bleed easily.   Psychiatric/Behavioral: Negative for depression.        Objective:     Temp 97.9 °F (36.6 °C) (Temporal)   Resp 18   Ht 5' 4" (1.626 m)   Wt 129.9 kg (286 lb 7.8 oz)   BMI 49.18 kg/m²     Ortho Exam     Obese 29 yo female in NAD; alert, oriented x 3; normal mood and affect; varus thrust left knee    Head: atraumatic  Eyes: EOM are normal. Right eye exhibits no discharge. Left eye exhibits no discharge  Cardiovascular: normal rate    Pulmonary/Chest: effort normal; no respiratory distress  Abdominal: soft    BLE: N/V intact; no edema    Left knee: no effusion; patella stable; tender MJL; ROM: 0-120 flexion; 1+ varus laxity; Lachman indeterminate    Imaging:     X-rays standing AP bilateral knee, lateral/sunrise left knee taken today are independently reviewed by me and show Kellgren Moshe grade 3 medial compartment degenerative change with varus deformity.      Assessment & Plan:      1. Primary osteoarthritis of left knee       1.  Findings, diagnosis, treatment options/risks/benefits were reviewed. Patient counseled that, in my opinion, she is not a candidate for ACLR/meniscal surgery due to the co-existing left knee OA.  2.  Left knee intra-articular cortisone injection administered as documented; post-injection instructions reviewed  3.  PT  4.  Double upright hinged brace left knee ordered through [facility]; brace wear/care instructions reviewed  5.  Health/orthopedic benefits of weight reduction discussed  6.  Follow-up in 3 months    "

## 2020-12-09 NOTE — PROCEDURES
Large Joint Aspiration/Injection: L knee    Date/Time: 12/9/2020 3:30 PM  Performed by: Austen Mars MD  Authorized by: Austen Mars MD     Consent Done?:  Yes (Verbal)  Indications:  Pain  Timeout: prior to procedure the correct patient, procedure, and site was verified    Prep: patient was prepped and draped in usual sterile fashion      Local anesthesia used?: Yes    Local anesthetic:  Topical anesthetic    Details:  Needle Size:  22 G  Ultrasonic Guidance for needle placement?: No    Approach: superolateral.  Location:  Knee  Site:  L knee  Medications:  40 mg triamcinolone acetonide 40 mg/mL  Patient tolerance:  Patient tolerated the procedure well with no immediate complications

## 2020-12-10 RX ORDER — TRIAMCINOLONE ACETONIDE 40 MG/ML
40 INJECTION, SUSPENSION INTRA-ARTICULAR; INTRAMUSCULAR
Status: DISCONTINUED | OUTPATIENT
Start: 2020-12-09 | End: 2020-12-10 | Stop reason: HOSPADM

## 2021-01-28 ENCOUNTER — TELEPHONE (OUTPATIENT)
Dept: ORTHOPEDICS | Facility: CLINIC | Age: 31
End: 2021-01-28

## 2021-01-28 DIAGNOSIS — M17.12 PRIMARY OSTEOARTHRITIS OF LEFT KNEE: Primary | ICD-10-CM

## 2021-06-16 ENCOUNTER — OFFICE VISIT (OUTPATIENT)
Dept: URGENT CARE | Facility: CLINIC | Age: 31
End: 2021-06-16
Payer: MEDICAID

## 2021-06-16 VITALS
HEIGHT: 65 IN | BODY MASS INDEX: 45.82 KG/M2 | SYSTOLIC BLOOD PRESSURE: 138 MMHG | RESPIRATION RATE: 14 BRPM | DIASTOLIC BLOOD PRESSURE: 90 MMHG | HEART RATE: 76 BPM | OXYGEN SATURATION: 99 % | TEMPERATURE: 99 F | WEIGHT: 275 LBS

## 2021-06-16 DIAGNOSIS — J02.9 SORE THROAT: ICD-10-CM

## 2021-06-16 DIAGNOSIS — U07.1 COVID-19 VIRUS DETECTED: Primary | ICD-10-CM

## 2021-06-16 LAB
CTP QC/QA: YES
CTP QC/QA: YES
MOLECULAR STREP A: NEGATIVE
SARS-COV-2 RDRP RESP QL NAA+PROBE: POSITIVE

## 2021-06-16 PROCEDURE — U0002 COVID-19 LAB TEST NON-CDC: HCPCS | Mod: QW,S$GLB,, | Performed by: NURSE PRACTITIONER

## 2021-06-16 PROCEDURE — 87651 STREP A DNA AMP PROBE: CPT | Mod: QW,S$GLB,, | Performed by: NURSE PRACTITIONER

## 2021-06-16 PROCEDURE — 99214 PR OFFICE/OUTPT VISIT, EST, LEVL IV, 30-39 MIN: ICD-10-PCS | Mod: S$GLB,,, | Performed by: NURSE PRACTITIONER

## 2021-06-16 PROCEDURE — U0002: ICD-10-PCS | Mod: QW,S$GLB,, | Performed by: NURSE PRACTITIONER

## 2021-06-16 PROCEDURE — 99214 OFFICE O/P EST MOD 30 MIN: CPT | Mod: S$GLB,,, | Performed by: NURSE PRACTITIONER

## 2021-06-16 PROCEDURE — 87651 POCT STREP A MOLECULAR: ICD-10-PCS | Mod: QW,S$GLB,, | Performed by: NURSE PRACTITIONER

## 2021-06-17 ENCOUNTER — NURSE TRIAGE (OUTPATIENT)
Dept: ADMINISTRATIVE | Facility: CLINIC | Age: 31
End: 2021-06-17

## 2021-06-18 ENCOUNTER — NURSE TRIAGE (OUTPATIENT)
Dept: ADMINISTRATIVE | Facility: CLINIC | Age: 31
End: 2021-06-18

## 2021-06-28 ENCOUNTER — HOSPITAL ENCOUNTER (EMERGENCY)
Facility: HOSPITAL | Age: 31
Discharge: HOME | End: 2021-06-28
Attending: EMERGENCY MEDICINE
Payer: MEDICAID

## 2021-06-28 VITALS
OXYGEN SATURATION: 99 % | DIASTOLIC BLOOD PRESSURE: 71 MMHG | HEIGHT: 65 IN | BODY MASS INDEX: 46.98 KG/M2 | TEMPERATURE: 99 F | WEIGHT: 282 LBS | RESPIRATION RATE: 18 BRPM | SYSTOLIC BLOOD PRESSURE: 155 MMHG | HEART RATE: 99 BPM

## 2021-06-28 DIAGNOSIS — S01.511A LIP LACERATION, INITIAL ENCOUNTER: Primary | ICD-10-CM

## 2021-06-28 PROCEDURE — 99283 EMERGENCY DEPT VISIT LOW MDM: CPT

## 2021-06-28 PROCEDURE — 99284 PR EMERGENCY DEPT VISIT,LEVEL IV: ICD-10-PCS | Mod: ,,, | Performed by: EMERGENCY MEDICINE

## 2021-06-28 PROCEDURE — 99284 EMERGENCY DEPT VISIT MOD MDM: CPT | Mod: ,,, | Performed by: EMERGENCY MEDICINE

## 2021-06-28 RX ORDER — CLINDAMYCIN HYDROCHLORIDE 300 MG/1
300 CAPSULE ORAL EVERY 6 HOURS
Qty: 28 CAPSULE | Refills: 0 | Status: SHIPPED | OUTPATIENT
Start: 2021-06-28

## 2021-08-05 ENCOUNTER — OFFICE VISIT (OUTPATIENT)
Dept: INTERNAL MEDICINE | Facility: CLINIC | Age: 31
End: 2021-08-05
Payer: MEDICAID

## 2021-08-05 VITALS
WEIGHT: 293 LBS | HEART RATE: 102 BPM | DIASTOLIC BLOOD PRESSURE: 80 MMHG | HEIGHT: 65 IN | BODY MASS INDEX: 48.82 KG/M2 | OXYGEN SATURATION: 98 % | SYSTOLIC BLOOD PRESSURE: 130 MMHG

## 2021-08-05 DIAGNOSIS — R19.7 DIARRHEA, UNSPECIFIED TYPE: Primary | ICD-10-CM

## 2021-08-05 DIAGNOSIS — R11.0 NAUSEA: ICD-10-CM

## 2021-08-05 PROCEDURE — 99213 PR OFFICE/OUTPT VISIT, EST, LEVL III, 20-29 MIN: ICD-10-PCS | Mod: S$PBB,,, | Performed by: STUDENT IN AN ORGANIZED HEALTH CARE EDUCATION/TRAINING PROGRAM

## 2021-08-05 PROCEDURE — 99999 PR PBB SHADOW E&M-EST. PATIENT-LVL III: ICD-10-PCS | Mod: PBBFAC,,, | Performed by: STUDENT IN AN ORGANIZED HEALTH CARE EDUCATION/TRAINING PROGRAM

## 2021-08-05 PROCEDURE — 99213 OFFICE O/P EST LOW 20 MIN: CPT | Mod: S$PBB,,, | Performed by: STUDENT IN AN ORGANIZED HEALTH CARE EDUCATION/TRAINING PROGRAM

## 2021-08-05 PROCEDURE — 99999 PR PBB SHADOW E&M-EST. PATIENT-LVL III: CPT | Mod: PBBFAC,,, | Performed by: STUDENT IN AN ORGANIZED HEALTH CARE EDUCATION/TRAINING PROGRAM

## 2021-08-05 PROCEDURE — 99213 OFFICE O/P EST LOW 20 MIN: CPT | Mod: PBBFAC | Performed by: STUDENT IN AN ORGANIZED HEALTH CARE EDUCATION/TRAINING PROGRAM

## 2021-08-05 RX ORDER — ONDANSETRON 4 MG/1
4 TABLET, FILM COATED ORAL EVERY 8 HOURS PRN
Qty: 15 TABLET | Refills: 0 | Status: SHIPPED | OUTPATIENT
Start: 2021-08-05 | End: 2021-08-10

## 2021-08-06 ENCOUNTER — CLINICAL SUPPORT (OUTPATIENT)
Dept: URGENT CARE | Facility: CLINIC | Age: 31
End: 2021-08-06
Payer: MEDICAID

## 2021-08-06 ENCOUNTER — TELEPHONE (OUTPATIENT)
Dept: URGENT CARE | Facility: CLINIC | Age: 31
End: 2021-08-06

## 2021-08-06 DIAGNOSIS — Z20.822 ENCOUNTER FOR LABORATORY TESTING FOR COVID-19 VIRUS: Primary | ICD-10-CM

## 2021-08-06 LAB
CTP QC/QA: YES
SARS-COV-2 RDRP RESP QL NAA+PROBE: POSITIVE

## 2021-08-06 PROCEDURE — U0002: ICD-10-PCS | Mod: QW,S$GLB,, | Performed by: PHYSICIAN ASSISTANT

## 2021-08-06 PROCEDURE — U0002 COVID-19 LAB TEST NON-CDC: HCPCS | Mod: QW,S$GLB,, | Performed by: PHYSICIAN ASSISTANT

## 2021-08-11 NOTE — PROGRESS NOTES
"Subjective:       Patient ID: Catina Myers is a 29 y.o. female.    Vitals:  height is 5' 4" (1.626 m) and weight is 120.2 kg (265 lb). Her temperature is 98.6 °F (37 °C). Her blood pressure is 122/81 and her pulse is 86. Her respiration is 18 and oxygen saturation is 97%.     Chief Complaint: URI    Patient was seen here Tuesday for vomiting and diarrhea. States that she tested negative for the flu. Today when she woke up, she had sore throat with cough, sinus pressure, body aches, and fever. She took an ibuprofen to help with the pain temporarily.    URI    This is a new problem. The current episode started today. The problem has been gradually worsening. Associated symptoms include congestion, coughing and a sore throat. Pertinent negatives include no ear pain, nausea, rash, sinus pain, vomiting or wheezing. She has tried NSAIDs for the symptoms. The treatment provided no relief.       Constitution: Positive for chills, sweating, fatigue and fever.   HENT: Positive for congestion and sore throat. Negative for ear pain, sinus pain, sinus pressure and voice change.    Neck: Negative for painful lymph nodes.   Eyes: Negative for eye redness.   Respiratory: Positive for cough. Negative for chest tightness, sputum production, bloody sputum, COPD, shortness of breath, stridor, wheezing and asthma.    Gastrointestinal: Negative for nausea and vomiting.   Musculoskeletal: Positive for muscle ache.   Skin: Negative for rash.   Allergic/Immunologic: Negative for seasonal allergies and asthma.   Hematologic/Lymphatic: Negative for swollen lymph nodes.       Objective:      Physical Exam   Constitutional: She is oriented to person, place, and time. She appears well-developed and well-nourished. She is cooperative.  Non-toxic appearance. She has a sickly appearance. She does not appear ill. No distress.   HENT:   Head: Normocephalic and atraumatic.   Right Ear: Hearing, tympanic membrane, external ear and ear canal normal. " [address] [place] [address] Department of Endocrinology Diabetes and Metabolism   [address] [facility] [address]   Phone: [phone]  Fax: [phone]    Date of Service: 8/11/2021  Primary Care Physician: ANASTASIA Shukla CNP  Provider: Krysta Arredondo MD    Reason for the visit:  Hyperthyroidism due toxic MNG     History of Present Illness: The history is provided by the patient. No  was used. Accuracy of the patient data is excellent.     Mireya Bradley is a very pleasant 60 y.o. female seen today for follow up visit     The patient told me that she was diagnosed with hyperthyroidism long time ago (>7 years) and never received any treatment   Currently on Methimazole 5 mg daily   Lab Results   Component Value Date/Time    TSH 2.430 04/18/2021 07:13 PM    Q5VJLEX 7.4 04/18/2021 07:13 PM     Patient denied intermittent palpitations, muscle ache but swelling in the area of the thyroid gland, weight loss, tremor, sweating, heat intolerance, or changes in bowel habits  Previous work up showed negative thyroid Abs   Lab Results   Component Value Date/Time    TSI <0.10 11/02/2020 08:51 AM    TPOABS <0.3 11/02/2020 08:51 AM    THGAB <0.9 11/02/2020 08:51 AM     Thyroid US 11/2/2020:  Right thyroid lobe:  5 x 1.7 x 1.9 cm --> 2.0 cm and 8 mm nodules   Left thyroid lobe:  4.6 x 2.1 x 1.8 cm --> no nodules   Isthmus:  0.5 cm --> no nodules      11/23/2020 - Thyroid uptake and scan --> consistent with toxic MNG       She is also not taking vitD (h/o intolerance to vitD capsules)     PAST MEDICAL HISTORY   Past Medical History:   Diagnosis Date    Anxiety     Arthritis     AVM (arteriovenous malformation) 1998    no treatment    Back pain     Bipolar depression (HCC)     Fibromyalgia     GERD (gastroesophageal reflux disease)     Headache(784.0)     History of peripheral edema     Hyperlipidemia     PONV (postoperative nausea and vomiting)     Prolonged emergence from   Left Ear: Hearing, tympanic membrane, external ear and ear canal normal.   Nose: Mucosal edema and rhinorrhea present. No nasal deformity. No epistaxis. Right sinus exhibits maxillary sinus tenderness and frontal sinus tenderness. Left sinus exhibits maxillary sinus tenderness and frontal sinus tenderness.   Mouth/Throat: Uvula is midline and mucous membranes are normal. No trismus in the jaw. Normal dentition. No uvula swelling. Posterior oropharyngeal erythema present. No oropharyngeal exudate or posterior oropharyngeal edema.   Eyes: Conjunctivae and lids are normal. No scleral icterus.   Neck: Trachea normal, full passive range of motion without pain and phonation normal. Neck supple. No neck rigidity. No edema and no erythema present.   Cardiovascular: Normal rate, regular rhythm, normal heart sounds, intact distal pulses and normal pulses.   Pulmonary/Chest: Effort normal and breath sounds normal. No respiratory distress. She has no decreased breath sounds. She has no rhonchi.   Abdominal: Normal appearance.   Musculoskeletal: Normal range of motion. She exhibits no edema or deformity.   Neurological: She is alert and oriented to person, place, and time. She exhibits normal muscle tone. Coordination normal.   Skin: Skin is warm, dry, intact, not diaphoretic and not pale.   Psychiatric: She has a normal mood and affect. Her speech is normal and behavior is normal. Judgment and thought content normal. Cognition and memory are normal.   Nursing note and vitals reviewed.        Recent Results (from the past 48 hour(s))   POCT Influenza A/B    Collection Time: 10/17/19  7:33 PM   Result Value Ref Range    Rapid Influenza A Ag Negative Negative    Rapid Influenza B Ag Positive (A) Negative     Acceptable Yes    POCT rapid strep A    Collection Time: 10/17/19  7:34 PM   Result Value Ref Range    Rapid Strep A Screen Negative Negative     Acceptable Yes    ]    Assessment:       1.  general anesthesia     Right-sided Bell's palsy     small residual effect    Serum calcium elevated 7/31/2019    Thyroid disorder     hyperthyroid no treatment currently    Viral URI 10/17/2017       PAST SURGICAL HISTORY   Past Surgical History:   Procedure Laterality Date    BREAST SURGERY      cyst removed  benign    COLONOSCOPY      COLONOSCOPY N/A 10/15/2019    COLONOSCOPY (DO NOT CHANGE TIME) performed by Lauren Oakley MD at [facility] [address] N/A 2/4/2020    LAPAROSCOPIC [address] (DO NOT CHANGE TIME-TRANSPORTATION) performed by Lauren Oakley MD at [id number] N/A 07/31/2017    lumbar epidural steroid injection     NERVE BLOCK Bilateral 10/02/2017    bilateral sacroilliac joint injection S1-S3    NERVE BLOCK Bilateral 10/16/2017    sacroiliac joint injection #2    OTHER SURGICAL HISTORY N/A 08/07/2017    lumbar epidural steroid injection #2 l4-5    TUBAL LIGATION      UPPER GASTROINTESTINAL ENDOSCOPY N/A 1/10/2020    EGD BIOPSY performed by Lauren [facility]ley MD at [facility]:   reports that she quit smoking about 2 years ago. Her smoking use included cigarettes. She has a 10.75 pack-year smoking history. She has never used smokeless tobacco.  Alcohol:   reports previous alcohol use. Drugs:   reports no history of drug use.     FAMILY HISTORY   Family History   Problem Relation Age of Onset    High Blood Pressure Mother     Pacemaker Mother     Cancer Mother         colon    Diabetes Mother     Anxiety Disorder Father     Arthritis Father        ALLERGIES AND DRUG REACTIONS   Allergies   Allergen Reactions    Aspirin Other (See Comments)     Does not take due to history of AVM in head    Codeine Other (See Comments)     Agitation and restlessness    Flagyl [Metronidazole] Nausea Only     Loss of appetite    Ciprofloxacin Nausea And Vomiting       CURRENT MEDICATIONS   Current Influenza B    2. Fever, unspecified fever cause        Plan:         Influenza B  -     oseltamivir (TAMIFLU) 75 MG capsule; Take 1 capsule (75 mg total) by mouth 2 (two) times daily. for 5 days  Dispense: 10 capsule; Refill: 0  -     benzonatate (TESSALON) 100 MG capsule; Take 1 capsule (100 mg total) by mouth 3 (three) times daily as needed.  Dispense: 30 capsule; Refill: 0    Fever, unspecified fever cause  -     POCT Influenza A/B  -     POCT rapid strep A          Patient Instructions   General Instructions for Viral URI:    Below are suggestions for symptomatic relief:              -Tylenol every 4 hours OR ibuprofen every 6 hours as needed for pain/fever.              -Salt water gargles to soothe throat pain.              -Chloroseptic spray also helps to numb throat pain.              -Nasal saline spray reduces inflammation and dryness.              -Warm face compresses to help with facial sinus pain/pressure.              -Vicks vapor rub at night.              -Flonase OTC or Nasacort OTC for nasal congestion.              -Simple foods like chicken noodle soup.              -Delsym helps with coughing at night              -Zyrtec/Claritin during the day & Benadryl at night may help with allergies.                If you DO NOT have Hypertension or any history of palpitations, it is ok to take over the counter Sudafed or Mucinex D or Allegra-D or Claritin-D or Zyrtec-D.  If you do take one of the above, it is ok to combine that with plain over the counter Mucinex or Allegra or Claritin or Zyrtec. If, for example, you are taking Zyrtec -D, you can combine that with Mucinex, but not Mucinex-D.  If you are taking Mucinex-D, you can combine that with plain Allegra or Claritin or Zyrtec.   If you DO have Hypertension or palpitations, it is safe to take Coricidin HBP for relief of sinus symptoms.     Please follow up with your primary care provider within 2-5 days if your signs and symptoms have not resolved  Outpatient Medications   Medication Sig Dispense Refill    methIMAzole (TAPAZOLE) 5 MG tablet TAKE 1 TABLET BY MOUTH EVERY DAY 90 tablet 3    pantoprazole (PROTONIX) 40 MG tablet Take 1 tablet by mouth daily 30 tablet 3    fluvoxaMINE (LUVOX) 100 MG tablet Take 100 mg by mouth daily Half a tab       clonazePAM (KLONOPIN) 0.5 MG tablet Take 0.5 mg by mouth daily. No current facility-administered medications for this visit. Review of Systems  Constitutional: No fever, no chills, no diaphoresis, no generalized weakness. HEENT: No blurred vision, No sore throat, no ear pain, no hair loss  Neck: denied any neck swelling, difficulty swallowing,   Cadrdiopulomary: No CP, SOB or palpitation, No orthopnea or PND. No cough or wheezing. GI: No N/V/D, no constipation, No abdominal pain, no melena or hematochezia   : Denied any dysuria, hematuria, flank pain, discharge, or incontinence. Skin: denied any rash, ulcer, Hirsute, or hyperpigmentation. MSK: denied any joint deformity, joint pain/swelling, muscle pain, or back pain. Neuro: no numbess, no tingling, no weakness,     OBJECTIVE    /81   Pulse 91   Ht 5' 6\" (1.676 m)   Wt 157 lb (71.2 kg)   LMP  (LMP Unknown)   BMI 25.34 kg/m²   BP Readings from Last 4 Encounters:   08/11/21 118/81   08/03/21 116/68   07/26/21 110/78   07/17/21 131/73     Wt Readings from Last 6 Encounters:   08/11/21 157 lb (71.2 kg)   08/03/21 157 lb (71.2 kg)   07/26/21 156 lb 11.2 oz (71.1 kg)   07/17/21 152 lb (68.9 kg)   04/18/21 155 lb (70.3 kg)   03/18/21 157 lb 4.8 oz (71.4 kg)       Physical examination:  General: awake alert, oriented x3, no abnormal position or movements. HEENT: normocephalic non traumatic, no exophthalmos, no lid lag, no lid retraction   Neck: supple, no LN enlargement, mild thyromegaly, no thyroid tenderness, no thyroid bruit, no JVD.   Pulm: Clear equal air entry no added sounds, no wheezing or rhonchi    CVS: S1 + S2, no murmur, no deniz. Dorsalis pedis pulse palpable   Abd: soft lax, no tenderness, no organomegaly, audible bowel sounds. Skin: warm, no lesions, no rash.   Musculoskeletal: No back tenderness, no kyphosis/scoliosis    Neuro: CN intact, sensation notmal , muscle power normal. No tremors   Psych: normal mood, and affect    Review of Laboratory Data:  I have reviewed the following:  Lab Results   Component Value Date/Time    WBC 3.9 (L) 07/17/2021 11:11 AM    RBC 3.99 07/17/2021 11:11 AM    HGB 12.7 07/17/2021 11:11 AM    HCT 37.4 07/17/2021 11:11 AM    MCV 93.7 07/17/2021 11:11 AM    MCH 31.8 07/17/2021 11:11 AM    MCHC 34.0 07/17/2021 11:11 AM    RDW 12.2 07/17/2021 11:11 AM     07/17/2021 11:11 AM    MPV 9.4 07/17/2021 11:11 AM      Lab Results   Component Value Date/Time     07/17/2021 11:11 AM    K 4.1 07/17/2021 11:11 AM    CO2 25 07/17/2021 11:11 AM    BUN 13 07/17/2021 11:11 AM    CREATININE 1.0 07/17/2021 11:11 AM    CALCIUM 9.8 07/17/2021 11:11 AM    LABGLOM >60 07/17/2021 11:11 AM    GFRAA >60 07/17/2021 11:11 AM      Lab Results   Component Value Date/Time    TSH 2.430 04/18/2021 07:13 PM    T4FREE 1.07 03/04/2021 01:22 PM    U8QVNGW 7.4 04/18/2021 07:13 PM    G9KZKEO 98.69 11/02/2020 08:51 AM    TSI <0.10 11/02/2020 08:51 AM    TPOABS <0.3 11/02/2020 08:51 AM    THGAB <0.9 11/02/2020 08:51 AM     Lab Results   Component Value Date    LABA1C 5.5 08/05/2015    GLUCOSE 87 07/17/2021    GLUCOSE 86 03/15/2012     Lab Results   Component Value Date    TRIG 80 07/10/2019    HDL 76 07/10/2019    LDLCALC 116 07/10/2019    CHOL 208 07/10/2019     Lab Results   Component Value Date    VITD25 23 11/02/2020    VITD25 23 09/30/2019     ASSESSMENT & RECOMMENDATIONS   Uyen Huitron, a 60 y.o.-old female seen in for management of following issues      Hyperthyroidism  · Due to toxic thyroid nodules   · Currently on Methimazole 5 mg daily   · Previous work up showed negative TSI, TPO-Ab, and Tg-Ab  · Check TFT and adjust or worsen.      If your condition worsens or fails to improve we recommend that you receive another evaluation at the emergency room immediately or contact your primary medical clinic to discuss your concerns.   You must understand that you have received an Urgent Care treatment only and that you may be released before all of your medical problems are known or treated. You, the patient, will arrange for follow up care as instructed.       Influenza (Adult)    Influenza is also called the flu. It is a viral illness that affects the air passages of your lungs. It is different from the common cold. The flu can easily be passed from one to person to another. It may be spread through the air by coughing and sneezing. Or it can be spread by touching the sick person and then touching your own eyes, nose, or mouth.  The flu starts 1 to 3 days after you are exposed to the flu virus. It may last for 1 to 2 weeks but many people feel tired or fatigued for many weeks afterward. You usually dont need to take antibiotics unless you have a complication. This might be an ear or sinus infection or pneumonia.  Symptoms of the flu may be mild or severe. They can include extreme tiredness (wanting to stay in bed all day), chills, fevers, muscle aches, soreness with eye movement, headache, and a dry, hacking cough.  Home care  Follow these guidelines when caring for yourself at home:  · Avoid being around cigarette smoke, whether yours or other peoples.  · Acetaminophen or ibuprofen will help ease your fever, muscle aches, and headache. Dont give aspirin to anyone younger than 18 who has the flu. Aspirin can harm the liver.  · Nausea and loss of appetite are common with the flu. Eat light meals. Drink 6 to 8 glasses of liquids every day. Good choices are water, sport drinks, soft drinks without caffeine, juices, tea, and soup. Extra fluids will also help loosen secretions in your nose and lungs.  · Over-the-counter cold medicines  the dose if needed   · Reviewed potential side effects of Methimazole, including agranulocytosis and liver dysfunction (cholestasis). vitD deficiency    · Discussed the effect of hyperthyroidism on bone health  · h/o intolerance to vitD capsules, will  Likely order vitD drops     Thyroid Nodules   · NM 11/2020 --> Largest 2 cm Rt side thyroid nodule was a hot nodule   · No need for FNA     I personally reviewed external notes from PCP and other patient's care team providers, and personally interpreted labs associated with the above diagnosis. I also ordered labs to further assess and manage the above addressed medical conditions    Return in about 1 year (around 8/11/2022) for Hyperthyroidism, VitD deficiency. The above issues were reviewed with the patient who understood and agreed with the plan. Thank you for allowing us to participate in the care of this patient. Please do not hesitate to contact us with any additional questions. Diagnosis Orders   1. Vitamin D deficiency     2. Hyperthyroidism  TSH without Reflex    T4, Free    methIMAzole (TAPAZOLE) 5 MG tablet   3. Multinodular goiter         Mayra Mejia MD  Endocrinologist, [facility]   [address] [place] [address]   Phone: [phone]  Fax: [phone]  ---------------------------------  An electronic signature was used to authenticate this note.  Savanna Nayak MD on 8/11/2021 at 8:28 PM will not make the flu go away faster. But the medicines may help with coughing, sore throat, and congestion in your nose and sinuses. Dont use a decongestant if you have high blood pressure.  · Stay home until your fever has been gone for at least 24 hours without using medicine to reduce fever.  Follow-up care  Follow up with your healthcare provider, or as advised, if you are not getting better over the next week.  If you are age 65 or older, talk with your provider about getting a pneumococcal vaccine every 5 years. You should also get this vaccine if you have chronic asthma or COPD. All adults should get a flu vaccine every fall. Ask your provider about this.  When to seek medical advice  Call your healthcare provider right away if any of these occur:  · Cough with lots of colored mucus (sputum) or blood in your mucus  · Chest pain, shortness of breath, wheezing, or trouble breathing  · Severe headache, or face, neck, or ear pain  · New rash with fever  · Fever of 100.4°F (38°C) or higher, or as directed by your healthcare provider  · Confusion, behavior change, or seizure  · Severe weakness or dizziness  · You get a new fever or cough after getting better for a few days  Date Last Reviewed: 1/1/2017  © 9167-8176 [employer]. [address] [place] [address], [place] [address]. All rights reserved. This information is not intended as a substitute for professional medical care. Always follow your healthcare professional's instructions.

## 2021-08-25 ENCOUNTER — LAB VISIT (OUTPATIENT)
Dept: PRIMARY CARE CLINIC | Facility: OTHER | Age: 31
End: 2021-08-25
Payer: MEDICAID

## 2021-08-25 DIAGNOSIS — Z20.822 ENCOUNTER FOR LABORATORY TESTING FOR COVID-19 VIRUS: ICD-10-CM

## 2021-08-25 PROCEDURE — U0003 INFECTIOUS AGENT DETECTION BY NUCLEIC ACID (DNA OR RNA); SEVERE ACUTE RESPIRATORY SYNDROME CORONAVIRUS 2 (SARS-COV-2) (CORONAVIRUS DISEASE [COVID-19]), AMPLIFIED PROBE TECHNIQUE, MAKING USE OF HIGH THROUGHPUT TECHNOLOGIES AS DESCRIBED BY CMS-2020-01-R: HCPCS | Performed by: INTERNAL MEDICINE

## 2021-08-27 LAB
SARS-COV-2 RNA RESP QL NAA+PROBE: DETECTED
SARS-COV-2- CYCLE NUMBER: 30

## 2022-01-03 ENCOUNTER — PATIENT MESSAGE (OUTPATIENT)
Dept: ADMINISTRATIVE | Facility: OTHER | Age: 32
End: 2022-01-03
Payer: MEDICAID

## 2022-01-03 ENCOUNTER — LAB VISIT (OUTPATIENT)
Dept: PRIMARY CARE CLINIC | Facility: OTHER | Age: 32
End: 2022-01-03
Attending: INTERNAL MEDICINE
Payer: MEDICAID

## 2022-01-03 DIAGNOSIS — R05.9 COUGH: ICD-10-CM

## 2022-01-03 PROCEDURE — U0003 INFECTIOUS AGENT DETECTION BY NUCLEIC ACID (DNA OR RNA); SEVERE ACUTE RESPIRATORY SYNDROME CORONAVIRUS 2 (SARS-COV-2) (CORONAVIRUS DISEASE [COVID-19]), AMPLIFIED PROBE TECHNIQUE, MAKING USE OF HIGH THROUGHPUT TECHNOLOGIES AS DESCRIBED BY CMS-2020-01-R: HCPCS | Performed by: INTERNAL MEDICINE

## 2022-01-07 LAB
SARS-COV-2 RNA RESP QL NAA+PROBE: NORMAL
TEST PERFORMANCE INFO SPEC: NORMAL

## 2022-06-27 NOTE — PROGRESS NOTES
"Subjective:       Patient ID: Catina Myers is a 28 y.o. female.    Vitals:  height is 5' 6" (1.676 m) and weight is 119.7 kg (264 lb). Her temperature is 98.8 °F (37.1 °C). Her blood pressure is 143/89 (abnormal) and her pulse is 93. Her respiration is 20 and oxygen saturation is 100%.     Chief Complaint: Influenza    Pt has been having body aches , chills , fever , back pain , sinus pressure and sore throat since yesterday. She has been taking ibuprofen , sinus medication and sidafed but nothing is working. Pt ears are hurting and feel like they are popping, her glands feel swollen and has a throbbing pain.      Influenza   This is a new problem. The current episode started yesterday. The problem occurs constantly. The problem has been gradually worsening. Associated symptoms include chills, congestion, a fever and a sore throat. Pertinent negatives include no diaphoresis, fatigue, myalgias, nausea, rash or vomiting. Nothing aggravates the symptoms. She has tried NSAIDs for the symptoms. The treatment provided no relief.       Constitution: Positive for chills and fever. Negative for sweating and fatigue.   HENT: Positive for ear pain, congestion, sinus pressure and sore throat. Negative for sinus pain and voice change.    Neck: Negative for painful lymph nodes.   Eyes: Negative for eye redness.   Respiratory: Negative for chest tightness, sputum production, bloody sputum, COPD, shortness of breath, stridor, wheezing and asthma.    Gastrointestinal: Negative for nausea and vomiting.   Musculoskeletal: Negative for muscle ache.   Skin: Negative for rash.   Allergic/Immunologic: Negative for seasonal allergies and asthma.   Hematologic/Lymphatic: Negative for swollen lymph nodes.       Objective:      Physical Exam   Constitutional: She is oriented to person, place, and time. She appears well-developed and well-nourished. She is cooperative.  Non-toxic appearance. She appears ill. No distress.   HENT:   Head: " Status: Pt naps off & on & continues to have poor hygiene  Pt rated her depression & anxiety a 5/10  Pt did not attend groups over the weekend, & appeared to have slept overnight  Medication: Haldol decreased to 7mg BID & Remeron increased to 45mg HS / PRN - Tylenol  D/C: Tuesday / 06/27/22 0750   Team Meeting   Meeting Type Daily Rounds   Team Members Present   Team Members Present Physician;Nurse; Other (Discipline and Name);    Physician Team Member Dr Cirilo Gracia / Kateryna Patino / Elizabeth Bullock Team Member Tamara Estrada / George Zaragoza Management Team Member Pamela Bray / Paul Sexton   Other (Discipline and Name) Frank Morelos (art therapy)   Patient/Family Present   Patient Present No   Patient's Family Present No Normocephalic and atraumatic.   Right Ear: Hearing, tympanic membrane, external ear and ear canal normal.   Left Ear: Hearing, tympanic membrane, external ear and ear canal normal.   Nose: Nose normal. No mucosal edema, rhinorrhea or nasal deformity. No epistaxis. Right sinus exhibits no maxillary sinus tenderness and no frontal sinus tenderness. Left sinus exhibits no maxillary sinus tenderness and no frontal sinus tenderness.   Mouth/Throat: Uvula is midline and mucous membranes are normal. No trismus in the jaw. Normal dentition. No uvula swelling. Posterior oropharyngeal edema and posterior oropharyngeal erythema present. Tonsils are 3+ on the right. Tonsils are 3+ on the left.   Eyes: Conjunctivae and lids are normal. No scleral icterus.   Sclera clear bilat   Neck: Trachea normal, full passive range of motion without pain and phonation normal. Neck supple.   Cardiovascular: Normal rate, regular rhythm, normal heart sounds, intact distal pulses and normal pulses.   Pulmonary/Chest: Effort normal and breath sounds normal. No respiratory distress.   Abdominal: Soft. Normal appearance and bowel sounds are normal. She exhibits no distension. There is no tenderness.   Musculoskeletal: Normal range of motion. She exhibits no edema or deformity.   Lymphadenopathy:     She has cervical adenopathy.        Right cervical: Superficial cervical adenopathy present.        Left cervical: Superficial cervical adenopathy present.   Neurological: She is alert and oriented to person, place, and time. She exhibits normal muscle tone. Coordination normal.   Skin: Skin is warm, dry and intact. She is not diaphoretic. No pallor.   Psychiatric: She has a normal mood and affect. Her speech is normal and behavior is normal. Judgment and thought content normal. Cognition and memory are normal.   Nursing note and vitals reviewed.      Results for orders placed or performed in visit on 02/28/19   POCT Influenza A/B   Result Value Ref  Range    Rapid Influenza A Ag Negative Negative    Rapid Influenza B Ag Negative Negative     Acceptable Yes    POCT rapid strep A   Result Value Ref Range    Rapid Strep A Screen Negative Negative     Acceptable Yes      Assessment:       1. Upper respiratory infection, acute    2. Fever, unspecified fever cause    3. Sore throat    4. Pharyngitis, unspecified etiology        Plan:         Upper respiratory infection, acute  -     dexamethasone injection 8 mg    Fever, unspecified fever cause  -     POCT Influenza A/B  -     POCT rapid strep A    Sore throat  -     POCT Influenza A/B  -     POCT rapid strep A    Pharyngitis, unspecified etiology  -     Strep A culture, throat  -     diphenhydrAMINE-aluminum-magnesium hydroxide-simethicone-lidocaine HCl 2%; Swish and spit 15 mLs every 6 (six) hours as needed.  Dispense: 100 mL; Refill: 0      Patient Instructions   Use an antihistamine such as Claritin, Zyrtec or Allegra to dry you out.     Use Flonase 1-2 sprays/nostril per day. It is a local acting steroid nasal spray, if you develop a bloody nose, stop using the medication immediately.    Use warm salt water gargles to ease your throat pain. Warm salt water gargles as needed for sore throat-  1/2 tsp salt to 1 cup warm water, gargle as desired. Hot tea with honey.    Sometimes Nyquil at night is beneficial to help you get some rest, however it is sedating and it does have an antihistamine, and tylenol.    Tylenol or Motrin as needed for body aches and chills.      Viral Upper Respiratory Illness (Adult)  You have a viral upper respiratory illness (URI), which is another term for the common cold. This illness is contagious during the first few days. It is spread through the air by coughing and sneezing. It may also be spread by direct contact (touching the sick person and then touching your own eyes, nose, or mouth). Frequent handwashing will decrease risk of spread. Most viral  illnesses go away within 7 to 10 days with rest and simple home remedies. Sometimes the illness may last for several weeks. Antibiotics will not kill a virus, and they are generally not prescribed for this condition.    Home care  · If symptoms are severe, rest at home for the first 2 to 3 days. When you resume activity, don't let yourself get too tired.  · Avoid being exposed to cigarette smoke (yours or others).  · You may use acetaminophen or ibuprofen to control pain and fever, unless another medicine was prescribed. (Note: If you have chronic liver or kidney disease, have ever had a stomach ulcer or gastrointestinal bleeding, or are taking blood-thinning medicines, talk with your healthcare provider before using these medicines.) Aspirin should never be given to anyone under 18 years of age who is ill with a viral infection or fever. It may cause severe liver or brain damage.  · Your appetite may be poor, so a light diet is fine. Avoid dehydration by drinking 6 to 8 glasses of fluids per day (water, soft drinks, juices, tea, or soup). Extra fluids will help loosen secretions in the nose and lungs.  · Over-the-counter cold medicines will not shorten the length of time youre sick, but they may be helpful for the following symptoms: cough, sore throat, and nasal and sinus congestion. (Note: Do not use decongestants if you have high blood pressure.)  Follow-up care  Follow up with your healthcare provider, or as advised.  When to seek medical advice  Call your healthcare provider right away if any of these occur:  · Cough with lots of colored sputum (mucus)  · Severe headache; face, neck, or ear pain  · Difficulty swallowing due to throat pain  · Fever of 100.4°F (38°C)  Call 911, or get immediate medical care  Call emergency services right away if any of these occur:  · Chest pain, shortness of breath, wheezing, or difficulty breathing  · Coughing up blood  · Inability to swallow due to throat pain  Date Last  Reviewed: 9/13/2015  © 7510-2257 [employer], [employer]. [address] [place] [address], [place] [address]. All rights reserved. This information is not intended as a substitute for professional medical care. Always follow your healthcare professional's instructions.

## 2022-07-30 ENCOUNTER — HOSPITAL ENCOUNTER (EMERGENCY)
Facility: HOSPITAL | Age: 32
Discharge: HOME | End: 2022-07-30
Attending: EMERGENCY MEDICINE
Payer: MEDICAID

## 2022-07-30 VITALS
BODY MASS INDEX: 42.68 KG/M2 | HEIGHT: 64 IN | DIASTOLIC BLOOD PRESSURE: 88 MMHG | WEIGHT: 250 LBS | RESPIRATION RATE: 16 BRPM | HEART RATE: 79 BPM | TEMPERATURE: 98 F | SYSTOLIC BLOOD PRESSURE: 124 MMHG | OXYGEN SATURATION: 99 %
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
## 2022-07-30 DIAGNOSIS — M25.531 RIGHT WRIST PAIN: ICD-10-CM

## 2022-07-30 LAB
B-HCG UR QL: NEGATIVE
CTP QC/QA: YES

## 2022-07-30 PROCEDURE — 99284 EMERGENCY DEPT VISIT MOD MDM: CPT | Mod: 25,ER

## 2022-07-30 PROCEDURE — 29125 APPL SHORT ARM SPLINT STATIC: CPT | Mod: RT,ER

## 2022-07-30 PROCEDURE — 81025 URINE PREGNANCY TEST: CPT | Mod: ER | Performed by: EMERGENCY MEDICINE

## 2022-07-30 RX ORDER — ACETAMINOPHEN 500 MG
1000 TABLET ORAL EVERY 8 HOURS PRN
Qty: 20 TABLET | Refills: 0 | Status: SHIPPED | OUTPATIENT
Start: 2022-07-30

## 2022-07-30 NOTE — DISCHARGE INSTRUCTIONS
Thank you for coming to our Emergency Department today. It is important to remember that some problems or medical conditions are difficult to diagnose and may not be found during your Emergency Department visit.     Be sure to follow up with your primary care doctor and review all labs/imaging/tests that were performed during your ER visit with them. Some labs/tests may be outside of the normal range and require non-emergent follow-up and further investigation to help diagnose/exclude/prevent complications or other potentially serious medical conditions that were not addressed during your ER visit.    If you do not have a primary care doctor, you may contact the one listed on your discharge paperwork or you may also call the [facility] Appointment Desk at [phone] to schedule an appointment and establish care with one. It is important to your health that you have a primary care doctor.    Please take all medications as directed. All medications may potentially have side-effects and it is impossible to predict which medications may give you side-effects or what side-effects (if any) they will give you.. If you feel that you are having a negative effect or side-effect of any medication you should immediately stop taking them and seek medical attention. If you feel that you are having a life-threatening reaction call 911.    Return to the ER with any questions/concerns, new/concerning symptoms, worsening or failure to improve.     Do not drive, swim, climb to height, take a bath, operate heavy machinery, drink alcohol or take potentially sedating medications, sign any legal documents or make any important decisions for 24 hours if you have received any pain medications, sedatives or mood altering drugs during your ER visit or within 24 hours of taking them if they have been prescribed to you.     You can find additional resources for Dentists, hearing aids, durable medical equipment, low cost pharmacies and  other resources at [email or web address]    BELOW THIS LINE ONLY APPLIES IF YOU HAVE A COVID TEST PENDING OR IF YOU HAVE BEEN DIAGNOSED WITH COVID:  Please access [facility] to review the results of your test. Until the results of your COVID test return, you should isolate yourself so as not to potentially spread illness to others.   If your COVID test returns positive, you should isolate yourself so as not to spread illness to others. After five full days, if you are feeling better and you have not had fever for 24 hours, you can return to your typical daily activities, but you must wear a mask around others for an additional 5 days.   If your COVID test returns negative and you are either unvaccinated or more than six months out from your two-dose vaccine and are not yet boosted, you should still quarantine for 5 full days followed by strict mask use for an additional 5 full days.   If your COVID test returns negative and you have received your 2-dose initial vaccine as well as a booster, you should continue strict mask use for 10 full days after the exposure.  For all those exposed, best practice includes a test at day 5 after the exposure. This can be a home test or a test through one of the many testing centers throughout our community.   Masking is always advised to limit the spread of COVID. Cdc.gov is an excellent site to obtain the latest up to date recommendations regarding COVID and COVID testing.     CDC Testing and Quarantine Guidelines for patients with exposure to a known-positive COVID-19 person:  A close exposure is defined as anyone who has had an exposure (masked or unmasked) to a known COVID -19 positive person within 6 feet of someone for a cumulative total of 15 minutes or more over a 24-hour period.   Vaccinated and/or if you recently had a positive covid test within 90 days do NOT need to quarantine after contact with someone who had COVID-19 unless you develop symptoms.   Fully vaccinated  people who have not had a positive test within 90 days, should get tested 3-5 days after their exposure, even if they don't have symptoms and wear a mask indoors in public for 14 days following exposure or until their test result is negative.      Unvaccinated and/or NOT had a positive test within 90 days and meet close exposure  You are required by CDC guidelines to quarantine for at least 5 days from time of exposure followed by 5 days of strict masking. It is recommended, but not required to test after 5 days, unless you develop symptoms, in which case you should test at that time.  If you get tested after 5 days and your test is positive, your 5 day period of isolation starts the day of the positive test.    If your exposure does not meet the above definition, you can return to your normal daily activities to include social distancing, wearing a mask and frequent handwashing.      Here is a link to guidance from the CDC:  https://www.cdc.gov/media/releases/2021/s1227-isolation-quarantine-guidance.html      [place] Dept Of Health Testing Sites:  https://ldh.la.gov/page/3934      [facility] website with testing locations and guidance:  [email or web address]

## 2022-07-30 NOTE — ED PROVIDER NOTES
Encounter Date: 2022    SCRIBE #1 NOTE: I, Saad Cancino, am scribing for, and in the presence of,  Reba Ugarte NP. I have scribed the following portions of the note - Other sections scribed: HPI,ROS.       History     Chief Complaint   Patient presents with    Fall     Slip and fall backwards, occurred yesterday, hit back of head, no LOC- reports headache, also lower back and right  wrist pain     Patient is a 32 year old female who presents to ED with complaints of a headache, lightheadedness, right wrist pain, and lower back pain onset yesterday. She slipped and fell at work yesterday and hit the back of her head and thinks she may have blacked out for 2 seconds. Has been taking Tylenol for pain relief. She is right handed. Denies any associated nausea or vomiting. No other complaints at this time.     The history is provided by the patient. No  was used.     Review of patient's allergies indicates:   Allergen Reactions    Latex, natural rubber Hives     Patient states last incident was a couple years ago and needed to go to ER for hives breakout.     Amoxil [amoxicillin]     Orange juice Hives    Toradol [ketorolac] Nausea And Vomiting     Past Medical History:   Diagnosis Date    Chlamydia     Chronic hypertension in obstetric context in second trimester     Flu     High-risk pregnancy     UTI (lower urinary tract infection)     Yeast infection      Past Surgical History:   Procedure Laterality Date     SECTION      HERNIA REPAIR       Family History   Problem Relation Age of Onset    No Known Problems Mother     No Known Problems Father     Diabetes Other     Hypertension Other      Social History     Tobacco Use    Smoking status: Former Smoker    Smokeless tobacco: Never Used   Substance Use Topics    Alcohol use: No    Drug use: No     Review of Systems   Constitutional: Negative for activity change, appetite change, chills, fatigue and fever.   HENT:  Negative for congestion, sore throat, trouble swallowing and voice change.    Eyes: Negative for photophobia, pain, redness and visual disturbance.   Respiratory: Negative for cough, chest tightness, shortness of breath and wheezing.    Cardiovascular: Negative for chest pain, palpitations and leg swelling.   Gastrointestinal: Negative for abdominal distention, abdominal pain, anal bleeding, constipation, diarrhea, nausea and vomiting.   Endocrine: Negative for polydipsia, polyphagia and polyuria.   Genitourinary: Negative for difficulty urinating, dysuria, frequency, hematuria, urgency, vaginal bleeding and vaginal discharge.   Musculoskeletal: Positive for arthralgias and back pain. Negative for myalgias.   Skin: Negative for rash and wound.   Neurological: Positive for light-headedness and headaches. Negative for dizziness and weakness.   Hematological: Negative for adenopathy.   All other systems reviewed and are negative.      Physical Exam     Initial Vitals [07/30/22 1355]   BP Pulse Resp Temp SpO2   128/84 76 20 98.9 °F (37.2 °C) 98 %      MAP       --         Physical Exam    Constitutional: She appears well-developed and well-nourished. She is not diaphoretic.  Non-toxic appearance. She does not have a sickly appearance. No distress.   HENT:   Head: Normocephalic and atraumatic.   Right Ear: External ear normal. No hemotympanum.   Left Ear: External ear normal. No hemotympanum.   Nose: Nose normal.   Mouth/Throat: Oropharynx is clear and moist.   Head and face atraumatic and normocephalic.   Eyes: Conjunctivae and EOM are normal. Pupils are equal, round, and reactive to light.   Neck: Trachea normal and phonation normal. Neck supple.   Normal range of motion.   Full passive range of motion without pain.     Cardiovascular: Normal rate, regular rhythm, normal heart sounds and intact distal pulses.   Pulmonary/Chest: Breath sounds normal. No respiratory distress.   Abdominal: Abdomen is soft. Bowel sounds  are normal.   Musculoskeletal:         General: Normal range of motion.      Right wrist: Tenderness present.      Cervical back: Normal, full passive range of motion without pain, normal range of motion and neck supple.      Thoracic back: Normal.      Lumbar back: Tenderness present.      Comments: Ambulatory with normal gait.  5/5 strength of the bilateral upper lower extremities with sensation intact.  There is tenderness with palpation of the paraspinal lumbar musculature.  No midline tenderness.     Neurological: She is alert and oriented to person, place, and time. She has normal strength. No cranial nerve deficit or sensory deficit. She displays a negative Romberg sign. GCS eye subscore is 4. GCS verbal subscore is 5. GCS motor subscore is 6.   Skin: Skin is warm and dry. Capillary refill takes less than 2 seconds.   Psychiatric: She has a normal mood and affect. Her behavior is normal.         ED Course   Splint Application    Date/Time: 7/30/2022 4:49 PM  Performed by: Reba Ugarte NP  Authorized by: Ximena Cedillo MD   Location details: right arm  Splint type: thumb spica  Supplies used: elastic bandage,  Ortho-Glass and cotton padding  Post-procedure: The splinted body part was neurovascularly unchanged following the procedure.  Patient tolerance: Patient tolerated the procedure well with no immediate complications        Labs Reviewed   POCT URINE PREGNANCY          Imaging Results          CT Head Without Contrast (Final result)  Result time 07/30/22 15:40:02    Final result by Navneet Allan MD (07/30/22 15:40:02)                 Impression:      No acute abnormality.      Electronically signed by: Navnete Allan  Date:    07/30/2022  Time:    15:40             Narrative:    EXAMINATION:  CT HEAD WITHOUT CONTRAST    CLINICAL HISTORY:  Head trauma, moderate-severe;    TECHNIQUE:  Low dose axial CT images obtained throughout the head without intravenous contrast. Sagittal and coronal  reconstructions were performed.    COMPARISON:  None.    FINDINGS:  Intracranial compartment:    Ventricles and sulci are normal in size for age without evidence of hydrocephalus. No extra-axial blood or fluid collections.    The brain parenchyma appears normal. No parenchymal mass, hemorrhage, edema or major vascular distribution infarct.    Skull/extracranial contents (limited evaluation): No fracture. Mastoid air cells and paranasal sinuses are essentially clear.                                X-Ray Wrist Complete Right (Final result)  Result time 07/30/22 15:43:39    Final result by Navneet Allan MD (07/30/22 15:43:39)                 Impression:      See above comments.    This report was flagged in Epic as abnormal.      Electronically signed by: Navneet Allan  Date:    07/30/2022  Time:    15:43             Narrative:    EXAMINATION:  XR WRIST COMPLETE 3 VIEWS RIGHT    CLINICAL HISTORY:  Pain in right wrist    TECHNIQUE:  PA, lateral, and oblique views of the right wrist were performed.    COMPARISON:  None    FINDINGS:  Minimal irregularity of the distal radial metaphysis.  Small nondisplaced fracture is not excluded.  No prior studies for comparison.  Recommend surveillance x-ray and/or CT if there is high clinical suspicion.    No significant abnormality elsewhere.                               X-Ray Lumbar Spine Ap And Lateral (Final result)  Result time 07/30/22 15:45:18    Final result by Navneet Allan MD (07/30/22 15:45:18)                 Impression:      No acute radiographic abnormality.      Electronically signed by: Navneet Allan  Date:    07/30/2022  Time:    15:45             Narrative:    EXAMINATION:  XR LUMBAR SPINE AP AND LATERAL    CLINICAL HISTORY:  low back pain;    TECHNIQUE:  AP, lateral and spot images were performed of the lumbar spine.    COMPARISON:  08/07/2018    FINDINGS:  Five non rib-bearing lumbar segments.  Alignment is satisfactory.  No acute fracture or traumatic  subluxation.  The posterior elements appear intact.                                 Medications - No data to display  Medical Decision Making:   History:   Old Medical Records: I decided to obtain old medical records.  Differential Diagnosis:   SAH/ICH, Skull/Spine/or other Bony Fracture, Dislocation, Subluxation, others     Independently Interpreted Test(s):   I have ordered and independently interpreted X-rays - see prior notes.  Clinical Tests:   Lab Tests: Ordered and Reviewed  Radiological Study: Ordered and Reviewed  ED Management:  32-year-old female presenting to the ED for evaluation of fall that occurred yesterday.  She is pleasant well-appearing. AAOx4 with no neurological deficits.  Full physical exam as above.  CT head unremarkable.  X-ray lumbar spine unremarkable.  X-ray right wrist with minimal irregularity of the distal radial metaphysis.  Small nondisplaced fracture not excluded.  She is tender in this region.  Placed in splint.  Ortho follow-up.  Strict return precautions discussed with patient verbalized understanding.  She is agreeable with plan of care.                  Scribe Attestation:   Scribe #1: I performed the above scribed service and the documentation accurately describes the services I performed. I attest to the accuracy of the note.                 I, TERESA Ugarte, personally performed the services described in this documentation. All medical record entries made by the scribe were at my direction and in my presence. I have reviewed the chart and agree that the record reflects my personal performance and is accurate and complete.  Clinical Impression:   Final diagnoses:  [M25.531] Right wrist pain  [W19.XXXA] Fall, initial encounter (Primary)          ED Disposition Condition    Discharge Stable        ED Prescriptions     Medication Sig Dispense Start Date End Date Auth. Provider    acetaminophen (TYLENOL) 500 MG tablet Take 2 tablets (1,000 mg total) by mouth every 8 (eight) hours as  needed for Pain. 20 tablet 7/30/2022  Reba Ugarte NP        Follow-up Information     Follow up With Specialties Details Why Contact Info    [facility] [place]  Schedule an appointment as soon as possible for a visit in 1 day For follow-up if you do not have a primary care doctor [address]  [place] [address]  [phone]      Suzy Villeda MD Orthopedic Surgery Schedule an appointment as soon as possible for a visit  For follow-up [address] [facility]  [place] [address]  [phone]      Joaquín Bloom MD Orthopedic Surgery Schedule an appointment as soon as possible for a visit  For follow-up [address] [place] [address]  [phone]      [facility] ED Emergency Medicine Go to  If symptoms worsen [address] [place] [id number] [phone]           Reba Ugarte NP  07/30/22 8915

## 2022-07-30 NOTE — Clinical Note
"Catina Cabrerarubi Myers was seen and treated in our emergency department on 7/30/2022.  She may return to work on 08/01/2022.       If you have any questions or concerns, please don't hesitate to call.      MB RN    "

## 2022-08-03 ENCOUNTER — TELEPHONE (OUTPATIENT)
Dept: ORTHOPEDICS | Facility: CLINIC | Age: 32
End: 2022-08-03
Payer: MEDICAID

## 2022-08-03 NOTE — TELEPHONE ENCOUNTER
Lt VM to inform patient would require a referral from her PCP in order to schedule an appt.   ----- Message from Nelly Gonzalez sent at 8/3/2022 11:15 AM CDT -----  Name of Who is Calling:MEGAN BELL [[id number]]              What is the request in detail:Patient is requesting a call back to schedule a follow up appointment.               Can the clinic reply by [facility]:no              What [facility] Back [facility]:[phone]

## 2022-08-03 NOTE — TELEPHONE ENCOUNTER
----- Message from Ana Borrego sent at 8/3/2022 11:29 AM CDT -----  Regarding: self  [phone]  .Type:  Sooner Appointment Request    Patient is requesting a sooner appointment.  Patient declined first available appointment listed as well as another facility and provider .  Patient will not accept being placed on the waitlist and is requesting a message be sent to doctor.    Name of Caller: self    When is the first available appointment? N/A    Symptoms:  fu from ER for wrist and thumb fracture asap     Would the patient rather a call back or a response via [facility]? Call back     Best Call Back Number: . [phone]

## 2022-08-03 NOTE — TELEPHONE ENCOUNTER
SPOKE W/ THE PT AND INFORMED HER THAT OUR ORTHO PROVIDE ARE NOT SEEING ANY HAND ISSUSES AT THE MOMENT, BUT I GAVE HER THE NUMBER TO THE HAND OF [place] AND [facility].. PT V/U

## 2024-02-14 NOTE — PLAN OF CARE
Problem: Patient Care Overview  Goal: Plan of Care Review  Outcome: Ongoing (interventions implemented as appropriate)  Mother requests to pump and bottle feed only.  Does not want to put baby to the breast.       No. MACRINA screening performed.  STOP BANG Legend: 0-2 = LOW Risk; 3-4 = INTERMEDIATE Risk; 5-8 = HIGH Risk No. MACRINA screening performed.  STOP BANG Legend: 0-2 = LOW Risk; 3-4 = INTERMEDIATE Risk; 5-8 = HIGH Risk

## 2024-10-10 ENCOUNTER — OFFICE VISIT (OUTPATIENT)
Dept: URGENT CARE | Facility: CLINIC | Age: 34
End: 2024-10-10
Payer: MEDICAID

## 2024-10-10 VITALS
DIASTOLIC BLOOD PRESSURE: 94 MMHG | WEIGHT: 248 LBS | HEART RATE: 70 BPM | OXYGEN SATURATION: 98 % | SYSTOLIC BLOOD PRESSURE: 136 MMHG | HEIGHT: 64 IN | RESPIRATION RATE: 19 BRPM | BODY MASS INDEX: 42.34 KG/M2 | TEMPERATURE: 98 F

## 2024-10-10 DIAGNOSIS — F41.9 ANXIETY: Primary | ICD-10-CM

## 2024-10-10 DIAGNOSIS — Z76.0 MEDICATION REFILL: ICD-10-CM

## 2024-10-10 DIAGNOSIS — G43.909 MIGRAINE WITHOUT STATUS MIGRAINOSUS, NOT INTRACTABLE, UNSPECIFIED MIGRAINE TYPE: ICD-10-CM

## 2024-10-10 RX ORDER — BUPROPION HYDROCHLORIDE 100 MG/1
100 TABLET ORAL 2 TIMES DAILY
Qty: 60 TABLET | Refills: 0 | Status: SHIPPED | OUTPATIENT
Start: 2024-10-10 | End: 2024-11-09

## 2024-10-10 RX ORDER — ALBUTEROL SULFATE 90 UG/1
2 INHALANT RESPIRATORY (INHALATION) EVERY 6 HOURS PRN
Qty: 18 G | Refills: 0 | Status: SHIPPED | OUTPATIENT
Start: 2024-10-10 | End: 2025-10-10

## 2024-10-10 RX ORDER — HYDROXYZINE HYDROCHLORIDE 25 MG/1
25 TABLET, FILM COATED ORAL NIGHTLY PRN
Qty: 20 TABLET | Refills: 0 | Status: SHIPPED | OUTPATIENT
Start: 2024-10-10

## 2024-10-10 RX ORDER — KETOROLAC TROMETHAMINE 30 MG/ML
30 INJECTION, SOLUTION INTRAMUSCULAR; INTRAVENOUS
Status: COMPLETED | OUTPATIENT
Start: 2024-10-10 | End: 2024-10-10

## 2024-10-10 RX ORDER — DEXAMETHASONE SODIUM PHOSPHATE 10 MG/ML
10 INJECTION INTRAMUSCULAR; INTRAVENOUS
Status: COMPLETED | OUTPATIENT
Start: 2024-10-10 | End: 2024-10-10

## 2024-10-10 RX ORDER — TOPIRAMATE 25 MG/1
25 TABLET ORAL 2 TIMES DAILY
COMMUNITY
Start: 2024-10-01

## 2024-10-10 RX ORDER — IBUPROFEN 600 MG/1
600 TABLET ORAL EVERY 6 HOURS PRN
Qty: 20 TABLET | Refills: 0 | Status: SHIPPED | OUTPATIENT
Start: 2024-10-10

## 2024-10-10 RX ADMIN — KETOROLAC TROMETHAMINE 30 MG: 30 INJECTION, SOLUTION INTRAMUSCULAR; INTRAVENOUS at 10:10

## 2024-10-10 RX ADMIN — DEXAMETHASONE SODIUM PHOSPHATE 10 MG: 10 INJECTION INTRAMUSCULAR; INTRAVENOUS at 10:10

## 2024-10-10 NOTE — PATIENT INSTRUCTIONS
Take ibuprofen as needed for headaches.  Because you already received a Toradol injection today, do not take ibuprofen until 8 hours after your injection.  Take Wellbutrin as prescribed for anxiety.  You can take hydroxyzine as needed nightly to aid with sleep.  Your albuterol inhaler has been refilled.    - Follow up with your PCP or specialty clinic as directed in the next 1-2 weeks if not improved or as needed.  You can call [phone] to schedule an appointment with the appropriate provider.    - Go to the ER or seek medical attention immediately if you develop new or worsening symptoms.    - You must understand that you have received an Urgent Care treatment only and that you may be released before all of your medical problems are known or treated.   - You, the patient, will arrange for follow up care as instructed.   - If your condition worsens or fails to improve we recommend that you receive another evaluation at the ER immediately or contact your PCP to discuss your concerns or return here.

## 2024-10-10 NOTE — LETTER
October 10, 2024      [facility]  [address], [address]  [place] [address]  Phone: [phone]  Fax: [phone]       Patient: Catina Myers   YOB: 1990  Date of Visit: 10/10/2024    To Whom It May Concern:    Mar Myers  was at [facility] on 10/10/2024. The patient may return to work on 10/14/24. If you have any questions or concerns, or if I can be of further assistance, please do not hesitate to contact me.    Sincerely,    Glen Demarco PA-C

## 2025-01-07 ENCOUNTER — OFFICE VISIT (OUTPATIENT)
Dept: URGENT CARE | Facility: CLINIC | Age: 35
End: 2025-01-07
Payer: MEDICAID

## 2025-01-07 VITALS
TEMPERATURE: 98 F | DIASTOLIC BLOOD PRESSURE: 73 MMHG | BODY MASS INDEX: 42.8 KG/M2 | SYSTOLIC BLOOD PRESSURE: 113 MMHG | OXYGEN SATURATION: 98 % | RESPIRATION RATE: 16 BRPM | HEART RATE: 71 BPM | HEIGHT: 64 IN | WEIGHT: 250.69 LBS

## 2025-01-07 DIAGNOSIS — J06.9 UPPER RESPIRATORY TRACT INFECTION, UNSPECIFIED TYPE: Primary | ICD-10-CM

## 2025-01-07 PROCEDURE — 99213 OFFICE O/P EST LOW 20 MIN: CPT | Mod: S$GLB,,, | Performed by: FAMILY MEDICINE

## 2025-01-07 RX ORDER — FLUTICASONE PROPIONATE 50 MCG
1 SPRAY, SUSPENSION (ML) NASAL DAILY
Qty: 18.2 ML | Refills: 0 | Status: SHIPPED | OUTPATIENT
Start: 2025-01-07

## 2025-01-07 RX ORDER — PROMETHAZINE HYDROCHLORIDE AND DEXTROMETHORPHAN HYDROBROMIDE 6.25; 15 MG/5ML; MG/5ML
5 SYRUP ORAL EVERY 6 HOURS PRN
Qty: 100 ML | Refills: 0 | Status: SHIPPED | OUTPATIENT
Start: 2025-01-07 | End: 2025-01-12

## 2025-01-07 RX ORDER — CETIRIZINE HYDROCHLORIDE 10 MG/1
10 TABLET ORAL DAILY
Qty: 30 TABLET | Refills: 0 | Status: SHIPPED | OUTPATIENT
Start: 2025-01-07 | End: 2025-02-06

## 2025-01-07 NOTE — PROGRESS NOTES
"Subjective:      Patient ID: Catina Myers is a 34 y.o. female.    Vitals:  height is 5' 4" (1.626 m) and weight is 113.7 kg (250 lb 10.6 oz). Her oral temperature is 98.3 °F (36.8 °C). Her blood pressure is 113/73 and her pulse is 71. Her respiration is 16 and oxygen saturation is 98%.     Chief Complaint: Sinus Problem    Patient complains of chills, cough, sore throat that started 1 week ago. Patient took ibuprofen, promethazine, truflu and torsten-seltzer.     Sinus Problem  This is a new problem. The current episode started in the past 7 days. The problem is unchanged. There has been no fever. Associated symptoms include chills, congestion, coughing and a sore throat. Past treatments include oral decongestants. The treatment provided no relief.     Constitution: Positive for chills.   HENT:  Positive for congestion and sore throat.    Respiratory:  Positive for cough.       Objective:     Physical Exam   Constitutional: She is oriented to person, place, and time.   HENT:   Head: Normocephalic.   Ears:   Right Ear: External ear normal.   Left Ear: External ear normal.   Nose: Rhinorrhea and congestion present.   Mouth/Throat: Mucous membranes are moist.   Eyes: Conjunctivae are normal.   Cardiovascular: Normal rate.   Pulmonary/Chest: Effort normal.   Musculoskeletal: Normal range of motion.         General: Normal range of motion.   Neurological: She is alert and oriented to person, place, and time.   Skin: Skin is dry.   Psychiatric: Her behavior is normal.       Assessment:     1. Upper respiratory tract infection, unspecified type        Plan:       Upper respiratory tract infection, unspecified type  -     cetirizine (ZYRTEC) 10 MG tablet; Take 1 tablet (10 mg total) by mouth once daily.  Dispense: 30 tablet; Refill: 0  -     fluticasone propionate (FLONASE) 50 mcg/actuation nasal spray; 1 spray (50 mcg total) by Each Nostril route once daily.  Dispense: 18.2 mL; Refill: 0  -     " promethazine-dextromethorphan (PROMETHAZINE-DM) 6.25-15 mg/5 mL Syrp; Take 5 mLs by mouth every 6 (six) hours as needed (cough).  Dispense: 100 mL; Refill: 0

## 2025-01-07 NOTE — LETTER
January 7, 2025      [facility]  [address], [address]  [place] [address]  Phone: [phone]  Fax: [phone]       Patient: Catina Myers   YOB: 1990  Date of Visit: 01/07/2025    To Whom It May Concern:    Mar Myers  was at [facility] on 01/07/2025. Excuse 01.03.25 - 01.07.25. The patient may return to work/school on 01.08.25 with no restrictions. If you have any questions or concerns, or if I can be of further assistance, please do not hesitate to contact me.    Sincerely,    Gloria Abdalla MD

## 2025-02-05 ENCOUNTER — HOSPITAL ENCOUNTER (EMERGENCY)
Facility: HOSPITAL | Age: 35
Discharge: HOME | End: 2025-02-05
Attending: EMERGENCY MEDICINE
Payer: MEDICAID

## 2025-02-05 VITALS
RESPIRATION RATE: 18 BRPM | HEART RATE: 71 BPM | DIASTOLIC BLOOD PRESSURE: 79 MMHG | SYSTOLIC BLOOD PRESSURE: 124 MMHG | WEIGHT: 249 LBS | BODY MASS INDEX: 42.51 KG/M2 | HEIGHT: 64 IN | OXYGEN SATURATION: 98 %

## 2025-02-05 DIAGNOSIS — A05.9 FOOD POISONING: Primary | ICD-10-CM

## 2025-02-05 LAB
ALBUMIN SERPL-MCNC: 3.3 G/DL (ref 3.3–5.5)
ALP SERPL-CCNC: 72 U/L (ref 42–141)
B-HCG UR QL: NEGATIVE
BILIRUB SERPL-MCNC: 0.4 MG/DL (ref 0.2–1.6)
BILIRUBIN, POC UA: ABNORMAL
BLOOD, POC UA: NEGATIVE
BUN SERPL-MCNC: 8 MG/DL (ref 7–22)
CALCIUM SERPL-MCNC: 9.3 MG/DL (ref 8–10.3)
CHLORIDE SERPL-SCNC: 106 MMOL/L (ref 98–108)
CLARITY, UA: CLEAR
COLOR, UA: YELLOW
CREAT SERPL-MCNC: 0.7 MG/DL (ref 0.6–1.2)
CTP QC/QA: YES
GLUCOSE SERPL-MCNC: 91 MG/DL (ref 73–118)
GLUCOSE, POC UA: NEGATIVE
HCT, POC: NORMAL
HGB, POC: NORMAL (ref 14–18)
KETONES, POC UA: ABNORMAL
LEUKOCYTE EST, POC UA: ABNORMAL
MCH, POC: NORMAL
MCHC, POC: NORMAL
MCV, POC: NORMAL
MPV, POC: NORMAL
NITRITE, POC UA: NEGATIVE
PH UR STRIP: 6 [PH] (ref 5–8)
POC ALT (SGPT): 23 U/L (ref 10–47)
POC AST (SGOT): 24 U/L (ref 11–38)
POC PLATELET COUNT: NORMAL
POC TCO2: 30 MMOL/L (ref 18–33)
POTASSIUM BLD-SCNC: 3.9 MMOL/L (ref 3.6–5.1)
PROTEIN, POC UA: ABNORMAL
PROTEIN, POC: 7.6 G/DL (ref 6.4–8.1)
RBC, POC: NORMAL
RDW, POC: NORMAL
SODIUM BLD-SCNC: 140 MMOL/L (ref 128–145)
SPECIFIC GRAVITY, POC UA: 1.02 (ref 1–1.03)
UROBILINOGEN, POC UA: 0.2 E.U./DL
WBC, POC: NORMAL

## 2025-02-05 PROCEDURE — 25500020 PHARM REV CODE 255: Mod: ER | Performed by: EMERGENCY MEDICINE

## 2025-02-05 PROCEDURE — 96361 HYDRATE IV INFUSION ADD-ON: CPT | Mod: ER

## 2025-02-05 PROCEDURE — 25000003 PHARM REV CODE 250: Mod: ER | Performed by: EMERGENCY MEDICINE

## 2025-02-05 PROCEDURE — 63600175 PHARM REV CODE 636 W HCPCS: Mod: ER | Performed by: EMERGENCY MEDICINE

## 2025-02-05 PROCEDURE — 81025 URINE PREGNANCY TEST: CPT | Mod: ER

## 2025-02-05 PROCEDURE — 99285 EMERGENCY DEPT VISIT HI MDM: CPT | Mod: 25,ER

## 2025-02-05 PROCEDURE — 96374 THER/PROPH/DIAG INJ IV PUSH: CPT | Mod: ER

## 2025-02-05 PROCEDURE — 80053 COMPREHEN METABOLIC PANEL: CPT | Mod: ER

## 2025-02-05 PROCEDURE — 81025 URINE PREGNANCY TEST: CPT | Mod: ER | Performed by: EMERGENCY MEDICINE

## 2025-02-05 PROCEDURE — 85025 COMPLETE CBC W/AUTO DIFF WBC: CPT | Mod: ER

## 2025-02-05 RX ORDER — METOCLOPRAMIDE 10 MG/1
10 TABLET ORAL EVERY 6 HOURS PRN
Qty: 30 TABLET | Refills: 0 | Status: SHIPPED | OUTPATIENT
Start: 2025-02-05

## 2025-02-05 RX ORDER — ONDANSETRON HYDROCHLORIDE 2 MG/ML
4 INJECTION, SOLUTION INTRAVENOUS
Status: COMPLETED | OUTPATIENT
Start: 2025-02-05 | End: 2025-02-05

## 2025-02-05 RX ORDER — SODIUM CHLORIDE 9 MG/ML
1000 INJECTION, SOLUTION INTRAVENOUS
Status: COMPLETED | OUTPATIENT
Start: 2025-02-05 | End: 2025-02-05

## 2025-02-05 RX ORDER — ONDANSETRON 4 MG/1
4 TABLET, ORALLY DISINTEGRATING ORAL EVERY 6 HOURS PRN
Qty: 10 TABLET | Refills: 0 | Status: SHIPPED | OUTPATIENT
Start: 2025-02-05

## 2025-02-05 RX ADMIN — SODIUM CHLORIDE 1000 ML: 9 INJECTION, SOLUTION INTRAVENOUS at 11:02

## 2025-02-05 RX ADMIN — ONDANSETRON 4 MG: 2 INJECTION INTRAMUSCULAR; INTRAVENOUS at 11:02

## 2025-02-05 RX ADMIN — IOHEXOL 100 ML: 350 INJECTION, SOLUTION INTRAVENOUS at 12:02

## 2025-02-05 NOTE — ED PROVIDER NOTES
"Encounter Date: 2025    SCRIBE #1 NOTE: I, Bee Lee, am scribing for, and in the presence of,  Shin Marie MD. I have scribed the following portions of the note - Other sections scribed: HPI,ROS,PE.       History     Chief Complaint   Patient presents with    Abdominal Pain     Pt complains of abd pain with diarrhea and fever since . Pt took "old" antibiotics.      Catina Myers is a 34 y.o. female, with a PMHx of chronic HTN, UTI, yeast infection, who presents to the ED with complaint of abdominal pain with associated nausea, vomiting, and diarrhea that began 4 days ago. Patient suspect her meal consisted of shrimp from the shrimp lot. Attempted treatment with "old antibiotics". No other exacerbating or alleviating factors. Denies chest pain, dyspnea, or other associated symptoms. Patient has no other complaint at this present time.       The history is provided by the patient. No  was used.     Review of patient's allergies indicates:   Allergen Reactions    Latex, natural rubber Hives     Patient states last incident was a couple years ago and needed to go to ER for hives breakout.     [place] juice Hives    Toradol [ketorolac] Nausea And Vomiting     Past Medical History:   Diagnosis Date    Chlamydia     Chronic hypertension in obstetric context in second trimester     Flu     High-risk pregnancy     UTI (lower urinary tract infection)     Yeast infection      Past Surgical History:   Procedure Laterality Date     SECTION      HERNIA REPAIR       Family History   Problem Relation Name Age of Onset    No Known Problems Mother      No Known Problems Father      Diabetes Other      Hypertension Other       Social History     Tobacco Use    Smoking status: Former    Smokeless tobacco: Never   Substance Use Topics    Alcohol use: No    Drug use: No     Review of Systems   Constitutional: Negative.  Negative for fever.   HENT: Negative.  Negative for sore throat.  "   Eyes: Negative.    Respiratory: Negative.  Negative for shortness of breath.    Cardiovascular: Negative.  Negative for chest pain.   Gastrointestinal:  Positive for abdominal pain, diarrhea, nausea and vomiting. Negative for blood in stool.   Endocrine: Negative.    Genitourinary: Negative.  Negative for dysuria.   Musculoskeletal: Negative.  Negative for myalgias.   Skin: Negative.  Negative for rash.   Allergic/Immunologic: Negative.    Neurological: Negative.  Negative for headaches.   Hematological: Negative.  Negative for adenopathy.   Psychiatric/Behavioral: Negative.  Negative for behavioral problems.    All other systems reviewed and are negative.      Physical Exam     Initial Vitals [02/05/25 1017]   BP Pulse Resp Temp SpO2   124/79 71 18 -- 98 %      MAP       --         Physical Exam    Nursing note and vitals reviewed.  Constitutional: She appears well-developed and well-nourished. She is Obese .   HENT:   Head: Normocephalic and atraumatic.   Eyes: Conjunctivae and EOM are normal. Pupils are equal, round, and reactive to light.   Neck: Neck supple. No thyroid mass present.   Cardiovascular:  Normal rate, regular rhythm, S1 normal, S2 normal, normal heart sounds and intact distal pulses.           Pulmonary/Chest: Effort normal and breath sounds normal. No respiratory distress.   Abdominal: Abdomen is soft. Bowel sounds are normal.   Musculoskeletal:         General: No tenderness. Normal range of motion.      Cervical back: Neck supple.     Lymphadenopathy:     She has no axillary adenopathy.   Neurological: She is alert and oriented to person, place, and time. GCS eye subscore is 4. GCS verbal subscore is 5. GCS motor subscore is 6.   Skin: Skin is warm, dry and intact.   Psychiatric: She has a normal mood and affect. Her speech is normal. Judgment normal. Cognition and memory are normal.         ED Course   Procedures  Labs Reviewed   POCT URINALYSIS W/O SCOPE - Abnormal       Result Value     Glucose, UA Negative      Bilirubin, UA 2+ (*)     Ketones, UA 4+ (*)     Spec Grav UA 1.025      Blood, UA Negative      PH, UA 6.0      Protein, UA 2+ (*)     Urobilinogen, UA 0.2      Nitrite, UA Negative      Leukocytes, UA Trace (*)     Color, UA POC Yellow      Clarity, UA, POC Clear     POCT CBC    Hematocrit        Hemoglobin        RBC        WBC        MCV        MCH, POC        MCHC        RDW-CV        Platelet Count, POC        MPV       POCT URINE PREGNANCY    POC Preg Test, Ur Negative       Acceptable Yes     POCT CMP   POCT URINALYSIS W/O SCOPE   POCT CMP    Albumin, POC 3.3      Alkaline Phosphatase, POC 72      ALT (SGPT), POC 23      AST (SGOT), POC 24      POC BUN 8      Calcium, POC 9.3      POC Chloride 106      POC Creatinine 0.7      POC Glucose 91      POC Potassium 3.9      POC Sodium 140      Bilirubin, POC 0.4      POC TCO2 30      Protein, POC 7.6            Imaging Results              CT Abdomen Pelvis With IV Contrast NO Oral Contrast (Final result)  Result time 02/05/25 12:27:35      Final result by Eric Tapia III, MD (02/05/25 12:27:35)                   Impression:      No acute process or significant abnormality seen.      Electronically signed by: Eric Tapia MD  Date:    02/05/2025  Time:    12:27               Narrative:    EXAMINATION:  CT ABDOMEN PELVIS WITH IV CONTRAST    CLINICAL HISTORY:  LLQ abdominal pain;    FINDINGS:  Comparison is 10/13/2015.    Patient was administered 100 cc of Omnipaque 350 intravenously.    Lung bases are clear.  The liver, gallbladder, biliary tree, spleen, stomach, pancreas, and duodenum demonstrate nothing unusual.  The adrenal glands demonstrate nothing unusual.  The kidneys demonstrate no mass, scar, stone, or hydronephrosis.  The vessels enhance normally.  No para-aortic, retroperitoneal, or mesenteric adenopathy seen.  Pelvic organs demonstrate nothing unusual.  Left lower quadrant is unremarkable.  There is no  evidence of diverticulosis or diverticulitis.  Bowel loops are unremarkable.  No adenopathy seen.  Bones show nothing focal.                                       Medications   0.9% NaCl infusion (1,000 mLs Intravenous New Bag 2/5/25 1105)   ondansetron injection 4 mg (4 mg Intravenous Given 2/5/25 1106)   iohexoL (OMNIPAQUE 350) injection 100 mL (100 mLs Intravenous Given 2/5/25 1220)     Medical Decision Making  Patient's signs and symptoms consistent with food poisoning.  Patient has generalized abdominal pain but it is having intermittent left lower quadrant abdominal pain so I went ahead did a CT scan to rule out any diverticulitis or any other infectious etiology or obstructive etiology.  Also considered GI  musculoskeletal infectious obstructive etiologies.  Patient's subsequently be discharged home with symptomatic medication.    Amount and/or Complexity of Data Reviewed  Labs: ordered. Decision-making details documented in ED Course.  Radiology: ordered.    Risk  Prescription drug management.            Scribe Attestation:   Scribe #1: I performed the above scribed service and the documentation accurately describes the services I performed. I attest to the accuracy of the note.        ED Course as of 02/05/25 1239   Wed Feb 05, 2025   1123 CBC is unremarkable with the exception of a hemoglobin of 11 and hematocrit of 33 [MI]   1237 POCT CMP  CMP normal urine pregnancy test negative urinalysis positive for 2+ bilirubin 4+ ketones and protein [MI]      ED Course User Index  [MI] Shin Marie MD                         This document was produced by a scribe under my direction and in my presence. I agree with the content of the note and have made any necessary edits.     Shin Marie MD    02/05/2025 12:39 PM    Clinical Impression:  Final diagnoses:  [A05.9] Food poisoning (Primary)          ED Disposition Condition    Discharge Stable          ED Prescriptions       Medication Sig Dispense  Start Date End Date Auth. Provider    ondansetron (ZOFRAN-ODT) 4 MG TbDL Take 1 tablet (4 mg total) by mouth every 6 (six) hours as needed. 10 tablet 2/5/2025 -- Shin Marie MD    metoclopramide HCl (REGLAN) 10 MG tablet Take 1 tablet (10 mg total) by mouth every 6 (six) hours as needed. 30 tablet 2/5/2025 -- Shin Marie MD          Follow-up Information       Follow up With Specialties Details Why Contact Info    Kishore Gaspar MD Family Medicine Schedule an appointment as soon as possible for a visit in 1 week  [address] [facility] [address] [phone]               Shin Marie MD  02/05/25 7125

## 2025-03-27 ENCOUNTER — OFFICE VISIT (OUTPATIENT)
Dept: URGENT CARE | Facility: CLINIC | Age: 35
End: 2025-03-27
Payer: MEDICAID

## 2025-03-27 VITALS
RESPIRATION RATE: 18 BRPM | WEIGHT: 261.69 LBS | HEART RATE: 81 BPM | HEIGHT: 64 IN | SYSTOLIC BLOOD PRESSURE: 138 MMHG | DIASTOLIC BLOOD PRESSURE: 91 MMHG | OXYGEN SATURATION: 98 % | BODY MASS INDEX: 44.68 KG/M2 | TEMPERATURE: 98 F

## 2025-03-27 DIAGNOSIS — R30.0 DYSURIA: ICD-10-CM

## 2025-03-27 DIAGNOSIS — N89.8 VAGINAL DISCHARGE: ICD-10-CM

## 2025-03-27 DIAGNOSIS — L30.4 INTERTRIGO: Primary | ICD-10-CM

## 2025-03-27 LAB
B-HCG UR QL: NEGATIVE
BILIRUBIN, UA POC OHS: NEGATIVE
BLOOD, UA POC OHS: NEGATIVE
CLARITY, UA POC OHS: ABNORMAL
COLOR, UA POC OHS: YELLOW
CTP QC/QA: YES
GLUCOSE, UA POC OHS: NEGATIVE
KETONES, UA POC OHS: ABNORMAL
LEUKOCYTES, UA POC OHS: ABNORMAL
NITRITE, UA POC OHS: NEGATIVE
PH, UA POC OHS: 7
PROTEIN, UA POC OHS: ABNORMAL
SPECIFIC GRAVITY, UA POC OHS: 1.02
UROBILINOGEN, UA POC OHS: 0.2

## 2025-03-27 PROCEDURE — 81515 NFCT DS BV&VAGINITIS DNA ALG: CPT

## 2025-03-27 PROCEDURE — 81003 URINALYSIS AUTO W/O SCOPE: CPT | Mod: QW,S$GLB,,

## 2025-03-27 PROCEDURE — 81025 URINE PREGNANCY TEST: CPT | Mod: S$GLB,,,

## 2025-03-27 PROCEDURE — 87591 N.GONORRHOEAE DNA AMP PROB: CPT

## 2025-03-27 PROCEDURE — 99214 OFFICE O/P EST MOD 30 MIN: CPT | Mod: S$GLB,,,

## 2025-03-27 RX ORDER — TRIAMCINOLONE ACETONIDE 1 MG/G
CREAM TOPICAL 2 TIMES DAILY
Qty: 28.4 G | Refills: 0 | Status: SHIPPED | OUTPATIENT
Start: 2025-03-27 | End: 2025-04-03

## 2025-03-27 RX ORDER — KETOCONAZOLE 20 MG/G
CREAM TOPICAL DAILY
Qty: 30 G | Refills: 0 | Status: SHIPPED | OUTPATIENT
Start: 2025-03-27

## 2025-03-27 RX ORDER — FLUCONAZOLE 150 MG/1
150 TABLET ORAL ONCE
Qty: 2 TABLET | Refills: 0 | Status: SHIPPED | OUTPATIENT
Start: 2025-03-27 | End: 2025-03-27

## 2025-03-27 NOTE — PROGRESS NOTES
"Subjective:      Patient ID: Catina Myers is a 34 y.o. female.    Vitals:  height is 5' 4" (1.626 m) and weight is 118.7 kg (261 lb 11 oz). Her oral temperature is 98.1 °F (36.7 °C). Her blood pressure is 138/91 (abnormal) and her pulse is 81. Her respiration is 18 and oxygen saturation is 98%.     Chief Complaint: Rash    34-year-old female here for rash to inguinal area for the past 3 days.  That started after she began using a new vaginal cream.  She has been applying hydrocortisone with minimal relief.  The rash is dry, scaly, and itchy.  Also noticed thick white vaginal discharge several days ago.  She had 1 instance of dysuria that has since resolved.  She denies fever, chills, frequency or urgency, abdominal back pain, nausea or vomiting.    Rash  Location: on both  creases of her leg near her vag area. The rash is characterized by redness and itchiness. Pertinent negatives include no fever or vomiting. Past treatments include anti-itch cream. The treatment provided no relief.       Constitution: Negative for chills and fever.   Gastrointestinal:  Negative for nausea and vomiting.   Genitourinary:  Positive for dysuria and vaginal discharge. Negative for frequency, urgency, flank pain, hematuria, vaginal odor, genital sore and pelvic pain.   Skin:  Positive for rash.   Allergic/Immunologic: Positive for itching.      Objective:     Physical Exam   Constitutional: She is oriented to person, place, and time. She appears well-developed.   HENT:   Head: Normocephalic and atraumatic.   Ears:   Right Ear: External ear normal.   Left Ear: External ear normal.   Nose: Nose normal.   Mouth/Throat: Oropharynx is clear and moist. Mucous membranes are moist. Oropharynx is clear.   Eyes: Conjunctivae, EOM and lids are normal. Pupils are equal, round, and reactive to light.   Neck: Trachea normal and phonation normal. Neck supple.   Cardiovascular: Normal rate and regular rhythm.   Pulmonary/Chest: Effort normal and " breath sounds normal.   Genitourinary: vaginal rash (Dry, scaly rash to inguinal area bilaterally.).    Vaginal discharge (Thick white) present.     Musculoskeletal: Normal range of motion.         General: Normal range of motion.   Lymphadenopathy: No inguinal adenopathy noted on the right or left side.   Neurological: She is alert and oriented to person, place, and time.   Skin: Skin is warm, dry and intact.   Psychiatric: Her speech is normal and behavior is normal. Judgment and thought content normal.   Nursing note and vitals reviewed.chaperone present (Delfina Ren MA)       Results for orders placed or performed in visit on 03/27/25   POCT urine pregnancy    Collection Time: 03/27/25 12:32 PM   Result Value Ref Range    POC Preg Test, Ur Negative Negative     Acceptable Yes    POCT Urinalysis(Instrument)    Collection Time: 03/27/25 12:33 PM   Result Value Ref Range    Color, POC UA Yellow Yellow, Straw, Colorless    Clarity, POC UA Slight Cloudy (A) Clear    Glucose, POC UA Negative Negative    Bilirubin, POC UA Negative Negative    Ketones, POC UA Trace (A) Negative    Spec Grav POC UA 1.020 1.005 - 1.030    Blood, POC UA Negative Negative    pH, POC UA 7.0 5.0 - 8.0    Protein, POC UA Trace (A) Negative    Urobilinogen, POC UA 0.2 <=1.0    Nitrite, POC UA Negative Negative    WBC, POC UA Trace (A) Negative         Assessment:     1. Intertrigo    2. Vaginal discharge    3. Dysuria        Plan:     Intertrigo  -     ketoconazole (NIZORAL) 2 % cream; Apply topically once daily.  Dispense: 30 g; Refill: 0  -     triamcinolone acetonide 0.1% (KENALOG) 0.1 % cream; Apply topically 2 (two) times daily. for 7 days  Dispense: 28.4 g; Refill: 0    Vaginal discharge  -     Vaginosis Screen by DNA Probe  -     C. trachomatis/N. gonorrhoeae by AMP DNA  -     fluconazole (DIFLUCAN) 150 MG Tab; Take 1 tablet (150 mg total) by mouth once. If still experiencing symptoms, then may take an additional  dose in 3 days. for 1 dose  Dispense: 2 tablet; Refill: 0    Dysuria  -     POCT Urinalysis(Instrument)  -     POCT urine pregnancy    Allergic reaction versus fungal skin infection.  Will cover with ketoconazole and triamcinolone.  Will treat for yeast vaginitis with Diflucan.  Vaginosis screen and GCCT sent to lab.          Patient Instructions   Apply the topical creams as prescribed to the rash.    Take Diflucan as prescribed.    We have sent some additional tested lab.  This will come back in a few days.  You can see your results on your [email or web address] patient portal.  We will call if we need to discuss results with you.    - Follow up with your PCP or specialty clinic as directed in the next 1-2 weeks if not improved or as needed.  You can call [phone] to schedule an appointment with the appropriate provider.    - Go to the ER or seek medical attention immediately if you develop new or worsening symptoms.    - You must understand that you have received an Urgent Care treatment only and that you may be released before all of your medical problems are known or treated.   - You, the patient, will arrange for follow up care as instructed.   - If your condition worsens or fails to improve we recommend that you receive another evaluation at the ER immediately or contact your PCP to discuss your concerns or return here.

## 2025-03-27 NOTE — PATIENT INSTRUCTIONS
Apply the topical creams as prescribed to the rash.    Take Diflucan as prescribed.    We have sent some additional tested lab.  This will come back in a few days.  You can see your results on your [facility] patient portal.  We will call if we need to discuss results with you.    - Follow up with your PCP or specialty clinic as directed in the next 1-2 weeks if not improved or as needed.  You can call [phone] to schedule an appointment with the appropriate provider.    - Go to the ER or seek medical attention immediately if you develop new or worsening symptoms.    - You must understand that you have received an Urgent Care treatment only and that you may be released before all of your medical problems are known or treated.   - You, the patient, will arrange for follow up care as instructed.   - If your condition worsens or fails to improve we recommend that you receive another evaluation at the ER immediately or contact your PCP to discuss your concerns or return here.

## 2025-03-31 ENCOUNTER — OCCUPATIONAL HEALTH (OUTPATIENT)
Dept: URGENT CARE | Facility: CLINIC | Age: 35
End: 2025-03-31

## 2025-03-31 DIAGNOSIS — Z13.9 ENCOUNTER FOR SCREENING: Primary | ICD-10-CM

## 2025-03-31 PROCEDURE — 99172 OCULAR FUNCTION SCREEN: CPT | Mod: S$GLB,,, | Performed by: NURSE PRACTITIONER

## 2025-03-31 PROCEDURE — 97750 PHYSICAL PERFORMANCE TEST: CPT | Mod: S$GLB,,, | Performed by: NURSE PRACTITIONER

## 2025-03-31 PROCEDURE — 99499 UNLISTED E&M SERVICE: CPT | Mod: S$GLB,,, | Performed by: NURSE PRACTITIONER

## 2025-05-02 ENCOUNTER — HOSPITAL ENCOUNTER (EMERGENCY)
Facility: HOSPITAL | Age: 35
Discharge: HOME | End: 2025-05-02
Attending: EMERGENCY MEDICINE
Payer: MEDICAID

## 2025-05-02 VITALS
OXYGEN SATURATION: 100 % | SYSTOLIC BLOOD PRESSURE: 139 MMHG | TEMPERATURE: 98 F | BODY MASS INDEX: 39.61 KG/M2 | WEIGHT: 232 LBS | HEART RATE: 79 BPM | DIASTOLIC BLOOD PRESSURE: 92 MMHG | RESPIRATION RATE: 20 BRPM | HEIGHT: 64 IN

## 2025-05-02 DIAGNOSIS — J06.9 VIRAL URI WITH COUGH: Primary | ICD-10-CM

## 2025-05-02 LAB
B-HCG UR QL: NEGATIVE
CTP QC/QA: YES
CTP QC/QA: YES
INFLUENZA A ANTIGEN, POC: NEGATIVE
INFLUENZA B ANTIGEN, POC: NEGATIVE
POC RAPID STREP A: NEGATIVE
SARS-COV-2 RDRP RESP QL NAA+PROBE: NEGATIVE

## 2025-05-02 PROCEDURE — 99284 EMERGENCY DEPT VISIT MOD MDM: CPT | Mod: 25,ER

## 2025-05-02 PROCEDURE — 81025 URINE PREGNANCY TEST: CPT | Mod: ER | Performed by: EMERGENCY MEDICINE

## 2025-05-02 PROCEDURE — 96372 THER/PROPH/DIAG INJ SC/IM: CPT | Performed by: NURSE PRACTITIONER

## 2025-05-02 PROCEDURE — 87880 STREP A ASSAY W/OPTIC: CPT | Mod: ER

## 2025-05-02 PROCEDURE — 87635 SARS-COV-2 COVID-19 AMP PRB: CPT | Mod: ER | Performed by: NURSE PRACTITIONER

## 2025-05-02 PROCEDURE — 63600175 PHARM REV CODE 636 W HCPCS: Mod: ER | Performed by: NURSE PRACTITIONER

## 2025-05-02 PROCEDURE — 87804 INFLUENZA ASSAY W/OPTIC: CPT | Mod: 59,ER

## 2025-05-02 PROCEDURE — 81025 URINE PREGNANCY TEST: CPT | Mod: ER

## 2025-05-02 RX ORDER — DEXAMETHASONE SODIUM PHOSPHATE 4 MG/ML
12 INJECTION, SOLUTION INTRA-ARTICULAR; INTRALESIONAL; INTRAMUSCULAR; INTRAVENOUS; SOFT TISSUE
Status: COMPLETED | OUTPATIENT
Start: 2025-05-02 | End: 2025-05-02

## 2025-05-02 RX ADMIN — DEXAMETHASONE SODIUM PHOSPHATE 12 MG: 4 INJECTION INTRA-ARTICULAR; INTRALESIONAL; INTRAMUSCULAR; INTRAVENOUS; SOFT TISSUE at 08:05

## 2025-05-02 NOTE — ED PROVIDER NOTES
Encounter Date: 2025       History     Chief Complaint   Patient presents with    Cough     Productive cough and sore throat, chest congestion x 3 days. Denies fever.      33 y/o female which presents to the ED with cough, congestion, chest soreness with coughing, and sore throat for 3 days. Denies wheezing or fevers. Denies SOB.     The history is provided by the patient.     Review of patient's allergies indicates:   Allergen Reactions    Latex, natural rubber Hives     Patient states last incident was a couple years ago and needed to go to ER for hives breakout.     [place] juice Hives    Toradol [ketorolac] Nausea And Vomiting     Past Medical History:   Diagnosis Date    Chlamydia     Chronic hypertension in obstetric context in second trimester     Flu     High-risk pregnancy     UTI (lower urinary tract infection)     Yeast infection      Past Surgical History:   Procedure Laterality Date     SECTION      HERNIA REPAIR       Family History   Problem Relation Name Age of Onset    No Known Problems Mother      No Known Problems Father      Diabetes Other      Hypertension Other       Social History[1]  Review of Systems   Constitutional:  Negative for fever.   HENT:  Positive for congestion and sore throat.    Respiratory:  Positive for cough. Negative for shortness of breath.    Cardiovascular:  Negative for chest pain.   Gastrointestinal:  Negative for nausea.   Genitourinary:  Negative for dysuria.   Musculoskeletal:  Negative for back pain.   Skin:  Negative for rash.   Neurological:  Negative for weakness.   Hematological:  Does not bruise/bleed easily.   All other systems reviewed and are negative.      Physical Exam     Initial Vitals [25 0806]   BP Pulse Resp Temp SpO2   (!) 139/92 79 20 98.4 °F (36.9 °C) 100 %      MAP       --         Physical Exam    Nursing note and vitals reviewed.  Constitutional: She appears well-developed and well-nourished.   HENT:   Head: Normocephalic and  atraumatic. Mouth/Throat: Uvula is midline. Posterior oropharyngeal edema and posterior oropharyngeal erythema present. No oropharyngeal exudate or tonsillar abscesses.   Swollen turbinates noted to bilateral nares and cobblestone appearance to posterior oropharynx   Eyes: Conjunctivae and EOM are normal. Pupils are equal, round, and reactive to light.   Neck:   Normal range of motion.  Cardiovascular:  Normal rate, regular rhythm, normal heart sounds and intact distal pulses.     Exam reveals no gallop and no friction rub.       No murmur heard.  Pulmonary/Chest: Breath sounds normal. No respiratory distress. She has no wheezes. She has no rhonchi. She has no rales. She exhibits no tenderness.   Musculoskeletal:         General: No tenderness or edema. Normal range of motion.      Cervical back: Normal range of motion.     Neurological: She is alert and oriented to person, place, and time. She has normal strength. GCS score is 15. GCS eye subscore is 4. GCS verbal subscore is 5. GCS motor subscore is 6.   Skin: Skin is warm. Capillary refill takes less than 2 seconds. No rash noted. No erythema.   Psychiatric: She has a normal mood and affect.       ED Course   Procedures  Labs Reviewed   SARS-COV-2 RDRP GENE       Result Value    POC Rapid COVID Negative       Acceptable Yes      Narrative:     This test utilizes isothermal nucleic acid amplification technology to detect the SARS-CoV-2 RdRp nucleic acid segment. The analytical sensitivity (limit of detection) is 500 copies/swab.     A POSITIVE result is indicative of the presence of SARS-CoV-2 RNA; clinical correlation with patient history and other diagnostic information is necessary to determine patient infection status.    A NEGATIVE result means that SARS-CoV-2 nucleic acids are not present above the limit of detection. A NEGATIVE result should be treated as presumptive. It does not rule out the possibility of COVID-19 and should not be the sole  basis for treatment decisions. If COVID-19 is strongly suspected based on clinical and exposure history, re-testing using an alternate molecular assay should be considered.     Commercial kits are provided by [employer].       POCT URINE PREGNANCY    POC Preg Test, Ur Negative       Acceptable Yes     POCT INFLUENZA A/B MOLECULAR   POCT STREP A MOLECULAR   POCT STREP A, RAPID    POC Rapid Strep A negative     POCT RAPID INFLUENZA A/B    Influenza B Ag negative      Inflenza A Ag negative            Imaging Results    None          Medications   dexAMETHasone injection 12 mg (12 mg Intramuscular Given 5/2/25 0853)     Medical Decision Making  35 y/o female with viral uri symptoms. COVID, influenza, and strep negative. No adventitious breath sounds to warrant a CXR. Pt given decadron in the ED and advised to take OTC meds. Patient given strict return precautions and voiced understanding of all discharge instructions. Pt was stable at discharge.       Differential Diagnosis: Viral URI with cough, influenza, COVID-19, pneumonia, strep pharyngitis        Problems Addressed:  Viral URI with cough: acute illness or injury    Amount and/or Complexity of Data Reviewed  Labs: ordered. Decision-making details documented in ED Course.    Risk  OTC drugs.  Prescription drug management.               ED Course as of 05/02/25 0855   Fri May 02, 2025   0807 BP(!): 139/92 [AT]   0807 Temp: 98.4 °F (36.9 °C) [AT]   0807 Temp Source: Oral [AT]   0807 Pulse: 79 [AT]   0807 Resp: 20 [AT]   0807 SpO2: 100 % [AT]   0824 hCG Qualitative, Urine: Negative [AT]   0837 Influenza B Ag: negative [AT]   0838 Inflenza A Ag: negative [AT]   0838 POC Rapid Strep A: negative [AT]   0838 SARS-CoV-2 RNA, Amplification, Qual: Negative [AT]      ED Course User Index  [AT] Natalie Mendoza FNP                           Clinical Impression:  Final diagnoses:  [J06.9] Viral URI with cough (Primary)          ED Disposition Condition     Discharge Stable          ED Prescriptions    None       Follow-up Information       Follow up With Specialties Details Why Contact Info    Kishore Gaspar MD Family Medicine Schedule an appointment as soon as possible for a visit  As needed [address] [facility] [address] [phone]      [facility] ED Emergency Medicine  If symptoms worsen [address] [facility] [address] [phone]               [1]   Social History  Tobacco Use    Smoking status: Former    Smokeless tobacco: Never   Substance Use Topics    Alcohol use: No    Drug use: No        Natalie Mendoza, P  05/02/25 0855

## 2025-05-02 NOTE — DISCHARGE INSTRUCTIONS
Zyrtec, Claritin, Allegra, or Xyzal for cold symptoms. You can also use astepro nasal spray to help with congestion. Please stay hydrated with water, powerade, or Gatorade. If you have fever, you can take ibuprofen or tylenol as needed.     Thank you for coming to our Emergency Department today. It is important to remember that some problems are difficult to diagnose and may not be found during your Emergency Department visit. Be sure to follow up with your primary care doctor and review all labs/imaging/tests that were performed during this visit with them. Some labs/tests may be outside of the normal range and require non-emergent follow-up and further investigation to help diagnose/exclude/prevent complications or other medical conditions.    If you do not have a primary care doctor, you may contact the one listed on your discharge paperwork or you may also call the [facility] Appointment Desk at [phone] to schedule an appointment and establish care with one. It is important to your health that you have a primary care doctor.    Please take all medications as directed. All medications may potentially have side-effects and it is impossible to predict which medications may give you side-effects or what side-effects (if any) they will give you.. If you feel that you are having a negative effect or side-effect of any medication you should immediately stop taking them and seek medical attention. If you feel that you are having a life-threatening reaction call 911.    Return to the ER with any questions/concerns, new/concerning symptoms, worsening or failure to improve.     Do not drive, swim, climb to height, take a bath or make any important decisions for 24 hours if you have received any pain medications, sedatives or mood altering drugs during your ER visit.

## 2025-05-02 NOTE — Clinical Note
"Catina Cabrerarubi Myers was seen and treated in our emergency department on 5/2/2025.  She may return to work on 05/04/2025.       If you have any questions or concerns, please don't hesitate to call.      Natalie Mendoza, KEITH"

## (undated) DEVICE — PAD ABD 8X10 STERILE